# Patient Record
Sex: MALE | Race: WHITE | HISPANIC OR LATINO | Employment: FULL TIME | URBAN - METROPOLITAN AREA
[De-identification: names, ages, dates, MRNs, and addresses within clinical notes are randomized per-mention and may not be internally consistent; named-entity substitution may affect disease eponyms.]

---

## 2018-06-04 ENCOUNTER — INITIAL CONSULT (OUTPATIENT)
Dept: DERMATOLOGY | Facility: CLINIC | Age: 48
End: 2018-06-04
Payer: COMMERCIAL

## 2018-06-04 VITALS
BODY MASS INDEX: 31.79 KG/M2 | SYSTOLIC BLOOD PRESSURE: 148 MMHG | DIASTOLIC BLOOD PRESSURE: 87 MMHG | WEIGHT: 203 LBS | HEART RATE: 103 BPM

## 2018-06-04 DIAGNOSIS — C44.01 BASAL CELL CARCINOMA, LIP: Primary | ICD-10-CM

## 2018-06-04 PROCEDURE — 99999 PR PBB SHADOW E&M-EST. PATIENT-LVL III: CPT | Mod: PBBFAC,,, | Performed by: DERMATOLOGY

## 2018-06-04 PROCEDURE — 99242 OFF/OP CONSLTJ NEW/EST SF 20: CPT | Mod: S$GLB,,, | Performed by: DERMATOLOGY

## 2018-06-04 RX ORDER — GLIPIZIDE 5 MG/1
5 TABLET, FILM COATED, EXTENDED RELEASE ORAL 2 TIMES DAILY
Refills: 3 | COMMUNITY
Start: 2018-03-15 | End: 2019-12-02

## 2018-06-04 RX ORDER — ERGOCALCIFEROL 1.25 MG/1
50000 CAPSULE ORAL
Refills: 1 | COMMUNITY
Start: 2018-03-23 | End: 2019-08-23

## 2018-06-04 RX ORDER — SITAGLIPTIN AND METFORMIN HYDROCHLORIDE 1000; 50 MG/1; MG/1
1 TABLET, FILM COATED ORAL 2 TIMES DAILY
Refills: 3 | COMMUNITY
Start: 2018-04-17 | End: 2019-09-06

## 2018-06-04 RX ORDER — NIFEDIPINE 90 MG/1
90 TABLET, FILM COATED, EXTENDED RELEASE ORAL DAILY
Refills: 3 | COMMUNITY
Start: 2018-03-17 | End: 2019-08-23

## 2018-06-04 RX ORDER — LOSARTAN POTASSIUM 100 MG/1
100 TABLET ORAL DAILY
Refills: 3 | COMMUNITY
Start: 2018-02-28 | End: 2019-12-02

## 2018-06-04 NOTE — PROGRESS NOTES
"REFERRING MD:  Maria Ibanez-Labadie, M.D.    CHIEF COMPLAINT:  New patient being consulted for Mohs' surgery evaluation.    HISTORY OF PRESENT ILLNESS:  48 y.o. male presents with a 3 month(s) history of growth on the L upper lip. Was a "big bump". (+) used Neosporin. (+) Never healed. Pt is from [place] - [place] wore sunscreen.    Negative for scabbing.  Negative for crusting.  Positive for bleeding.  Negative for itching.    Biopsy consistent with basal cell carcinoma.     No prior treatment.    Pacemaker: No  Defibrillator: No  Artificial joints: No  Artificial heart valves: No    PAST MEDICAL HISTORY:  Past Medical History:   Diagnosis Date    Basal cell carcinoma     Diabetes mellitus     Hypertension     Kidney stones        PAST SURGICAL HISTORY:  Past Surgical History:   Procedure Laterality Date    INNER EAR SURGERY          SOCIAL HISTORY:  Dependencies:  never smoked    PERTINENT MEDICATIONS:  See medications list.    ALLERGIES:  Patient has no known allergies.    ROS:  Skin: See HPI  Constitutional: No fatigue, fever, malaise, weight loss, or night sweats.  Cardiovascular: No chest pain, palpitations, or edema.  Respiratory: No coughing, wheezing, SOB, or sputum production.    Physical Exam   HENT:   Mouth/Throat:             General: Mood and affect normal. Alert and orient X3. Normal appearance.  Eyelids:  no suspicious lesions  Head/Face:  no suspicious lesions  Lips/Teeth/Gums: L upper mucosal lip extending up to vermilion located 3.5 cm medially from the right oral commissure and 2 cm medially from the left oral commissure.      IMPRESSION:  Biopsy proven nodular basal cell carcinoma, L upper lip, path# [id number].    PLAN:  The diagnosis and the pathology report were discussed in detail with the patient. Treatment options were reviewed, including Mohs Micrographic Surgery, radiation, topical therapy, and standard excision.  After careful review of patient's history and " physical exam, and after discussion of treatment options, the decision was made to perform Mohs micrographic surgery.    Scheduled patient for Mohs Micrographic Surgery. Risks, benefits, and alternatives of Mohs' surgery discussed with the patient. Discussed repair options including complex closure, skin flap, skin graft and second intention healing with the patient. Pre-operative instructions provided to the patient.

## 2018-06-12 ENCOUNTER — TELEPHONE (OUTPATIENT)
Dept: DERMATOLOGY | Facility: CLINIC | Age: 48
End: 2018-06-12

## 2018-06-12 NOTE — TELEPHONE ENCOUNTER
----- Message from Clementine Rodriguez sent at 6/12/2018  4:07 PM CDT -----  Contact: patient wife  Please call above patient wife need to speak with the nurse before surgery

## 2018-06-14 ENCOUNTER — PROCEDURE VISIT (OUTPATIENT)
Dept: DERMATOLOGY | Facility: CLINIC | Age: 48
End: 2018-06-14
Payer: COMMERCIAL

## 2018-06-14 VITALS
SYSTOLIC BLOOD PRESSURE: 135 MMHG | HEART RATE: 107 BPM | BODY MASS INDEX: 31.86 KG/M2 | WEIGHT: 203 LBS | DIASTOLIC BLOOD PRESSURE: 78 MMHG | HEIGHT: 67 IN

## 2018-06-14 DIAGNOSIS — C44.01 BASAL CELL CARCINOMA, LIP: Primary | ICD-10-CM

## 2018-06-14 PROCEDURE — 99499 UNLISTED E&M SERVICE: CPT | Mod: S$GLB,,, | Performed by: DERMATOLOGY

## 2018-06-14 PROCEDURE — 17311 MOHS 1 STAGE H/N/HF/G: CPT | Mod: S$GLB,,, | Performed by: DERMATOLOGY

## 2018-06-14 PROCEDURE — 13151 CMPLX RPR E/N/E/L 1.1-2.5 CM: CPT | Mod: 51,S$GLB,, | Performed by: DERMATOLOGY

## 2018-06-14 RX ORDER — OXYCODONE AND ACETAMINOPHEN 5; 325 MG/1; MG/1
1 TABLET ORAL
Qty: 20 TABLET | Refills: 0 | Status: SHIPPED | OUTPATIENT
Start: 2018-06-14 | End: 2018-07-23

## 2018-06-14 RX ORDER — OXYCODONE AND ACETAMINOPHEN 5; 325 MG/1; MG/1
1 TABLET ORAL
Qty: 20 TABLET | Refills: 0 | Status: SHIPPED | OUTPATIENT
Start: 2018-06-14 | End: 2018-06-14 | Stop reason: SDUPTHER

## 2018-06-14 RX ORDER — CEPHALEXIN 500 MG/1
500 CAPSULE ORAL 3 TIMES DAILY
Qty: 21 CAPSULE | Refills: 0 | Status: SHIPPED | OUTPATIENT
Start: 2018-06-14 | End: 2018-06-21

## 2018-06-14 RX ORDER — CEPHALEXIN 500 MG/1
500 CAPSULE ORAL 3 TIMES DAILY
Qty: 21 CAPSULE | Refills: 0 | Status: SHIPPED | OUTPATIENT
Start: 2018-06-14 | End: 2018-06-14 | Stop reason: SDUPTHER

## 2018-06-14 NOTE — PROGRESS NOTES
PROCEDURE: Mohs' Micrographic Surgery    INDICATION: Location in mask areas of face including central face, nose, eyelids, eyebrows, lips, chin, preauricular, temple, and ear. Biopsy-proven skin cancer of cosmetically and functionally important areas, including head, neck, genital, hand, foot, or areas known for having difficulty in healing, such as the lower anterior legs. Tumor with ill-defined borders.    REFERRING MD: Maria Ibanez-Labadie, M.D.    CASE NUMBER:     ANESTHETIC: 1.5 cc 0.5% Lidocaine with Epi 1:200,000 mixed 1:1 with 0.5% Bupivacaine    SURGICAL PREP: Betadine    SURGEON: Moiz Florian MD    ASSISTANTS: Anni Jung PA-C and Maria Elena Cameron MA    PREOPERATIVE DIAGNOSIS: basal cell carcinoma    POSTOPERATIVE DIAGNOSIS: basal cell carcinoma    PATHOLOGIC DIAGNOSIS: basal cell carcinoma- nodular    HISTOLOGY OF SPECIMENS IN FIRST STAGE:   Tumor Type: No tumor seen.    STAGES OF MOHS' SURGERY PERFORMED: 1    TUMOR-FREE PLANE ACHIEVED: Yes    HEMOSTASIS: electrocoagulation     SPECIMENS: 2     LOCATION: left upper (mucosal) lip. Patient verified location with hand held mirror.    INITIAL LESION SIZE: 0.3 x 0.7 cm    FINAL DEFECT SIZE: 0.8 x 0.9 cm    WOUND REPAIR/DISPOSITION: The patient tolerated Mohs' Micrographic Surgery for a basal cell carcinoma very well. When the tumor was completely removed, a repair of the surgical defect was undertaken.      PROCEDURE: Complex Linear Repair    INDICATION: Status post Mohs' Micrographic Surgery for basal cell carcinoma.    CASE NUMBER:     SURGEON: Moiz Florian MD    ASSISTANTS: Anni Jung PA-C and Maria Elena Cameron MA    ANESTHETIC: 2.5 cc 1% Lidocaine with Epinephrine 1:100,000    SURGICAL PREP: Betadine    LOCATION: left upper (mucosal) lip    DEFECT SIZE: 0.8 x 0.9 cm    WOUND REPAIR/DISPOSITION:  After the patient's carcinoma had been completely removed with Mohs' Micrographic Surgery, a repair of the surgical defect was undertaken. The  "patient was returned to the operating suite where the area of left upper lip was prepped, draped, and anesthetized in the usual sterile fashion. Prior to administration of local anesthetic, the vermilion border was marked with a sterile marking pen. The wound was widely undermined in all directions. Then, electrocoagulation was used to obtain meticulous hemostasis. 5-0 Vicryl buried vertical mattress sutures were placed into the subcutaneous and dermal plane to close the wound and lupe the cutaneous wound edge. Bilateral dog ears were identified and were removed by a standard Burow's triangle technique. The cutaneous wound edges were closed using interrupted 5-0 Prolene and 5-0 silk suture. Care was taken to realign the vermilion border.    The patient tolerated the procedure well.    The area was cleaned and dressed appropriately and the patient was given wound care instructions, as well as appointment for follow-up evaluation. Patient was placed on Percocet 5 prn postop pain and Keflex 500 mg TID x 7 days.    LENGTH OF REPAIR: 2.4 cm    Vitals:    06/14/18 0755 06/14/18 0935   BP: (!) 142/81 135/78   BP Location: Left arm    Patient Position: Sitting    BP Method: Medium (Automatic)    Pulse: (!) 115 107   Weight: 92.1 kg (203 lb)    Height: 5' 7" (1.702 m)          "

## 2018-06-21 ENCOUNTER — OFFICE VISIT (OUTPATIENT)
Dept: DERMATOLOGY | Facility: CLINIC | Age: 48
End: 2018-06-21
Payer: COMMERCIAL

## 2018-06-21 DIAGNOSIS — Z09 POSTOP CHECK: Primary | ICD-10-CM

## 2018-06-21 PROCEDURE — 99024 POSTOP FOLLOW-UP VISIT: CPT | Mod: S$GLB,,, | Performed by: DERMATOLOGY

## 2018-06-21 PROCEDURE — 99999 PR PBB SHADOW E&M-EST. PATIENT-LVL III: CPT | Mod: PBBFAC,,, | Performed by: DERMATOLOGY

## 2018-06-21 RX ORDER — ATORVASTATIN CALCIUM 80 MG/1
80 TABLET, FILM COATED ORAL DAILY
COMMUNITY
Start: 2018-06-19 | End: 2022-10-10 | Stop reason: SDUPTHER

## 2018-06-21 NOTE — PROGRESS NOTES
48 y.o. male patient is here for suture removal following Mohs' surgery.    Patient reports no problems.    WOUND PE:  The L upper lip sutures intact. Wound healing well. Good skin edges. No signs or symptoms of infection. Vermilion border intact with good symmetry.    IMPRESSION:  Healing operative site from Mohs' surgery, BCC L upper lip s/p Mohs with CLC, postop day #7.    PLAN:  Sutures removed today.   Continue wound care.  Keep moist with Aquaphor.    RTC:  In 1 month.

## 2018-07-23 ENCOUNTER — OFFICE VISIT (OUTPATIENT)
Dept: DERMATOLOGY | Facility: CLINIC | Age: 48
End: 2018-07-23
Payer: COMMERCIAL

## 2018-07-23 DIAGNOSIS — C44.01 BASAL CELL CARCINOMA, LIP: Primary | ICD-10-CM

## 2018-07-23 PROCEDURE — 99212 OFFICE O/P EST SF 10 MIN: CPT | Mod: S$GLB,,, | Performed by: DERMATOLOGY

## 2018-07-23 PROCEDURE — 99999 PR PBB SHADOW E&M-EST. PATIENT-LVL II: CPT | Mod: PBBFAC,,, | Performed by: DERMATOLOGY

## 2018-07-23 RX ORDER — NIFEDIPINE 60 MG/1
TABLET, EXTENDED RELEASE ORAL
Refills: 1 | COMMUNITY
Start: 2018-07-07 | End: 2022-06-10

## 2018-07-23 RX ORDER — ICOSAPENT ETHYL 1000 MG/1
2 CAPSULE ORAL 2 TIMES DAILY
Refills: 3 | COMMUNITY
Start: 2018-07-12 | End: 2020-08-06 | Stop reason: CLARIF

## 2018-07-23 NOTE — PROGRESS NOTES
48 y.o. male patient is here for wound check after surgery.    Patient reports no problems.    WOUND PE:  The L upper lip incision is well healed. Mild firmness and erythema of scar. No nodularity. Vermilion border intact with good symmetry.      IMPRESSION:  Healing operative site from Mohs' surgery, BCC L upper lip s/p Mohs with CLC, postop week #5, well healed and no evidence of recurrence.    PLAN:    Recommended massage tid.  Reassured patient that redness and firmness will fade with time.  Daily SPF.  Regular skin checks.    RTC:  In 3-6 months with Maria Ibanez-Labadie, M.D. for skin check or sooner if new concern arises.

## 2019-03-26 ENCOUNTER — TELEPHONE (OUTPATIENT)
Dept: DERMATOLOGY | Facility: CLINIC | Age: 49
End: 2019-03-26

## 2019-03-26 NOTE — TELEPHONE ENCOUNTER
----- Message from Nataliya Isaac sent at 3/26/2019  8:54 AM CDT -----  Contact: pts keven Florian Patient Requesting Sooner Appointment.     Reason for sooner appt.: pt had surgery and they have a spot on the side of the surgery site that the pt is concerned about pt had surgery last year   When is the first available appointment? N/A  Communication Preference: can you please call pts keven Santana  at [phone]  Additional Information: pt would like to be seen this week     BERRY

## 2019-03-26 NOTE — TELEPHONE ENCOUNTER
Spoke with pt's keven and advised her to contact general derm to make an appt for skin check. She understands if they take a bx and it comes back positive for skin cancer then he will be referred to Dr. Florian for removal.

## 2019-03-27 ENCOUNTER — TELEPHONE (OUTPATIENT)
Dept: DERMATOLOGY | Facility: CLINIC | Age: 49
End: 2019-03-27

## 2019-03-27 NOTE — TELEPHONE ENCOUNTER
Returned pt's call. No answer. Left voice message.----- Message from Sarina Chaudhary sent at 3/26/2019  4:07 PM CDT -----  Contact: Patients wife       ----- Message -----  From: Pooja Santana  Sent: 3/26/2019   3:15 PM  To: Juan Luis Staff    Patient Requesting New Patient Appointment.     Reason for appt.: Patient would like to be seen for an overall skin check and to get established w/ a new provider. Patient has a history of skin cancer.    Communication Preference: [phone]

## 2019-08-23 ENCOUNTER — CLINICAL SUPPORT (OUTPATIENT)
Dept: FAMILY MEDICINE | Facility: CLINIC | Age: 49
End: 2019-08-23
Attending: FAMILY MEDICINE
Payer: COMMERCIAL

## 2019-08-23 ENCOUNTER — LAB VISIT (OUTPATIENT)
Dept: LAB | Facility: HOSPITAL | Age: 49
End: 2019-08-23
Attending: FAMILY MEDICINE
Payer: COMMERCIAL

## 2019-08-23 ENCOUNTER — OFFICE VISIT (OUTPATIENT)
Dept: FAMILY MEDICINE | Facility: CLINIC | Age: 49
End: 2019-08-23
Payer: COMMERCIAL

## 2019-08-23 VITALS
HEART RATE: 104 BPM | SYSTOLIC BLOOD PRESSURE: 136 MMHG | TEMPERATURE: 98 F | WEIGHT: 198.44 LBS | HEIGHT: 67 IN | BODY MASS INDEX: 31.15 KG/M2 | OXYGEN SATURATION: 98 % | DIASTOLIC BLOOD PRESSURE: 84 MMHG

## 2019-08-23 DIAGNOSIS — E78.5 DYSLIPIDEMIA: ICD-10-CM

## 2019-08-23 DIAGNOSIS — I10 BENIGN HYPERTENSION: ICD-10-CM

## 2019-08-23 DIAGNOSIS — Z13.5 SCREENING FOR DIABETIC RETINOPATHY: ICD-10-CM

## 2019-08-23 DIAGNOSIS — E11.40 TYPE 2 DIABETES MELLITUS WITH DIABETIC NEUROPATHY, WITHOUT LONG-TERM CURRENT USE OF INSULIN: Primary | ICD-10-CM

## 2019-08-23 DIAGNOSIS — Z12.11 SCREEN FOR COLON CANCER: ICD-10-CM

## 2019-08-23 DIAGNOSIS — E11.40 TYPE 2 DIABETES MELLITUS WITH DIABETIC NEUROPATHY, WITHOUT LONG-TERM CURRENT USE OF INSULIN: ICD-10-CM

## 2019-08-23 DIAGNOSIS — Z23 NEED FOR VACCINATION: ICD-10-CM

## 2019-08-23 DIAGNOSIS — N18.30 CKD (CHRONIC KIDNEY DISEASE) STAGE 3, GFR 30-59 ML/MIN: ICD-10-CM

## 2019-08-23 LAB
ALBUMIN SERPL BCP-MCNC: 4.2 G/DL (ref 3.5–5.2)
ALP SERPL-CCNC: 72 U/L (ref 55–135)
ALT SERPL W/O P-5'-P-CCNC: 38 U/L (ref 10–44)
ANION GAP SERPL CALC-SCNC: 12 MMOL/L (ref 8–16)
AST SERPL-CCNC: 41 U/L (ref 10–40)
BASOPHILS # BLD AUTO: 0.03 K/UL (ref 0–0.2)
BASOPHILS NFR BLD: 0.3 % (ref 0–1.9)
BILIRUB SERPL-MCNC: 0.6 MG/DL (ref 0.1–1)
BUN SERPL-MCNC: 34 MG/DL (ref 6–20)
CALCIUM SERPL-MCNC: 9.5 MG/DL (ref 8.7–10.5)
CHLORIDE SERPL-SCNC: 104 MMOL/L (ref 95–110)
CHOLEST SERPL-MCNC: 170 MG/DL (ref 120–199)
CHOLEST/HDLC SERPL: 7.7 {RATIO} (ref 2–5)
CO2 SERPL-SCNC: 23 MMOL/L (ref 23–29)
CREAT SERPL-MCNC: 2.3 MG/DL (ref 0.5–1.4)
DIFFERENTIAL METHOD: ABNORMAL
EOSINOPHIL # BLD AUTO: 0.3 K/UL (ref 0–0.5)
EOSINOPHIL NFR BLD: 3.5 % (ref 0–8)
ERYTHROCYTE [DISTWIDTH] IN BLOOD BY AUTOMATED COUNT: 13.7 % (ref 11.5–14.5)
EST. GFR  (AFRICAN AMERICAN): 37 ML/MIN/1.73 M^2
EST. GFR  (NON AFRICAN AMERICAN): 32 ML/MIN/1.73 M^2
GLUCOSE SERPL-MCNC: 207 MG/DL (ref 70–110)
HCT VFR BLD AUTO: 33.7 % (ref 40–54)
HDLC SERPL-MCNC: 22 MG/DL (ref 40–75)
HDLC SERPL: 12.9 % (ref 20–50)
HGB BLD-MCNC: 11 G/DL (ref 14–18)
LDLC SERPL CALC-MCNC: ABNORMAL MG/DL (ref 63–159)
LYMPHOCYTES # BLD AUTO: 2 K/UL (ref 1–4.8)
LYMPHOCYTES NFR BLD: 23.5 % (ref 18–48)
MCH RBC QN AUTO: 28.9 PG (ref 27–31)
MCHC RBC AUTO-ENTMCNC: 32.6 G/DL (ref 32–36)
MCV RBC AUTO: 89 FL (ref 82–98)
MONOCYTES # BLD AUTO: 0.9 K/UL (ref 0.3–1)
MONOCYTES NFR BLD: 10.6 % (ref 4–15)
NEUTROPHILS # BLD AUTO: 5.4 K/UL (ref 1.8–7.7)
NEUTROPHILS NFR BLD: 62.3 % (ref 38–73)
NONHDLC SERPL-MCNC: 148 MG/DL
PLATELET # BLD AUTO: 231 K/UL (ref 150–350)
PMV BLD AUTO: 11.8 FL (ref 9.2–12.9)
POTASSIUM SERPL-SCNC: 4.1 MMOL/L (ref 3.5–5.1)
PROT SERPL-MCNC: 8.8 G/DL (ref 6–8.4)
PTH-INTACT SERPL-MCNC: 161.2 PG/ML (ref 9–77)
RBC # BLD AUTO: 3.81 M/UL (ref 4.6–6.2)
SODIUM SERPL-SCNC: 139 MMOL/L (ref 136–145)
TRIGL SERPL-MCNC: 652 MG/DL (ref 30–150)
TSH SERPL DL<=0.005 MIU/L-ACNC: 2.5 UIU/ML (ref 0.4–4)
WBC # BLD AUTO: 8.64 K/UL (ref 3.9–12.7)

## 2019-08-23 PROCEDURE — 36415 COLL VENOUS BLD VENIPUNCTURE: CPT | Mod: PN

## 2019-08-23 PROCEDURE — 90471 PNEUMOCOCCAL CONJUGATE VACCINE 13-VALENT LESS THAN 5YO & GREATER THAN: ICD-10-PCS | Mod: S$GLB,,, | Performed by: FAMILY MEDICINE

## 2019-08-23 PROCEDURE — 99204 PR OFFICE/OUTPT VISIT, NEW, LEVL IV, 45-59 MIN: ICD-10-PCS | Mod: 25,S$GLB,, | Performed by: FAMILY MEDICINE

## 2019-08-23 PROCEDURE — 90670 PNEUMOCOCCAL CONJUGATE VACCINE 13-VALENT LESS THAN 5YO & GREATER THAN: ICD-10-PCS | Mod: S$GLB,,, | Performed by: FAMILY MEDICINE

## 2019-08-23 PROCEDURE — 92250 FUNDUS PHOTOGRAPHY W/I&R: CPT | Mod: 26,S$GLB,, | Performed by: OPTOMETRIST

## 2019-08-23 PROCEDURE — 84443 ASSAY THYROID STIM HORMONE: CPT

## 2019-08-23 PROCEDURE — 83970 ASSAY OF PARATHORMONE: CPT

## 2019-08-23 PROCEDURE — 90670 PCV13 VACCINE IM: CPT | Mod: S$GLB,,, | Performed by: FAMILY MEDICINE

## 2019-08-23 PROCEDURE — 80061 LIPID PANEL: CPT

## 2019-08-23 PROCEDURE — 99204 OFFICE O/P NEW MOD 45 MIN: CPT | Mod: 25,S$GLB,, | Performed by: FAMILY MEDICINE

## 2019-08-23 PROCEDURE — 99999 PR PBB SHADOW E&M-EST. PATIENT-LVL III: CPT | Mod: PBBFAC,,, | Performed by: FAMILY MEDICINE

## 2019-08-23 PROCEDURE — 2024F 7 FLD RTA PHOTO EVC RTNOPTHY: CPT | Mod: S$GLB,,, | Performed by: FAMILY MEDICINE

## 2019-08-23 PROCEDURE — 92250 DIABETIC EYE SCREENING PHOTO: ICD-10-PCS | Mod: 26,S$GLB,, | Performed by: OPTOMETRIST

## 2019-08-23 PROCEDURE — 2024F PR 7 FIELD PHOTOS WITH INTERP/ REVIEW: ICD-10-PCS | Mod: S$GLB,,, | Performed by: FAMILY MEDICINE

## 2019-08-23 PROCEDURE — 92250 FUNDUS PHOTOGRAPHY W/I&R: CPT | Mod: TC,S$GLB,, | Performed by: FAMILY MEDICINE

## 2019-08-23 PROCEDURE — 90471 IMMUNIZATION ADMIN: CPT | Mod: S$GLB,,, | Performed by: FAMILY MEDICINE

## 2019-08-23 PROCEDURE — 99999 PR PBB SHADOW E&M-EST. PATIENT-LVL III: ICD-10-PCS | Mod: PBBFAC,,, | Performed by: FAMILY MEDICINE

## 2019-08-23 PROCEDURE — 85025 COMPLETE CBC W/AUTO DIFF WBC: CPT

## 2019-08-23 PROCEDURE — 92250 DIABETIC EYE SCREENING PHOTO: ICD-10-PCS | Mod: TC,S$GLB,, | Performed by: FAMILY MEDICINE

## 2019-08-23 PROCEDURE — 83036 HEMOGLOBIN GLYCOSYLATED A1C: CPT

## 2019-08-23 PROCEDURE — 80053 COMPREHEN METABOLIC PANEL: CPT

## 2019-08-23 RX ORDER — FUROSEMIDE 80 MG/1
80 TABLET ORAL EVERY MORNING
Refills: 3 | COMMUNITY
Start: 2019-07-02 | End: 2019-12-02

## 2019-08-23 RX ORDER — FUROSEMIDE 40 MG/1
40 TABLET ORAL DAILY
Refills: 1 | COMMUNITY
Start: 2019-05-28 | End: 2019-08-23

## 2019-08-23 RX ORDER — ASPIRIN 81 MG/1
81 TABLET ORAL
COMMUNITY
End: 2023-09-19

## 2019-08-23 NOTE — PROGRESS NOTES
.Nicholas Nugent is a 49 y.o. male here for a diabetic eye screening with non-dilated fundus photos per PCP orders.    Patient cooperative?: Yes  Small pupils?: Yes  Last eye exam: ?    For exam results, see Encounter Report.

## 2019-08-24 LAB
ESTIMATED AVG GLUCOSE: 183 MG/DL (ref 68–131)
HBA1C MFR BLD HPLC: 8 % (ref 4–5.6)

## 2019-08-31 NOTE — PROGRESS NOTES
Routine Office Visit    Patient Name: Nicholas Nugent    : 1970  MRN: [id number]    Subjective:  Nicholas is a 49 y.o. male who presents today for:   Chief Complaint   Patient presents with    Establish Care     49-year-old male with hypertension, diabetes, chronic kidney disease stage 3, and dyslipidemia comes in to establish care with new PCP.  He sees a nephrologist in [place].  He had not been really seeing primary care.  The patient last saw his nephrologist in July, who strongly encouraged the patient to see a primary care provider.  The patient reports that he has not had an eye exam done in some time.  He also reports that he has not had colon cancer screening done.      Past Medical History  Past Medical History:   Diagnosis Date    Basal cell carcinoma 2018    lip    CKD (chronic kidney disease) stage 3, GFR 30-59 ml/min     Diabetes mellitus     Diabetes mellitus type I     Hyperlipidemia     Hypertension     Kidney stones        Past Surgical History  Past Surgical History:   Procedure Laterality Date    CYSTOSCOPY, POSSIBLE OCCLUSION BALLOON PLACEMENT Left 10/23/2015    Performed by [facility] at [facility]    INNER EAR SURGERY      NEPHROLITHOTOMY-PERCUTANEOUS (C-ARM) Left 10/23/2015    Performed by [facility] at [facility] OR        Family History  No family history on file.    Social History  Social History     Socioeconomic History    Marital status: Significant Other     Spouse name: Not on file    Number of children: Not on file    Years of education: Not on file    Highest education level: Not on file   Occupational History    Not on file   Social Needs    Financial resource strain: Not on file    Food insecurity:     Worry: Not on file     Inability: Not on file    Transportation needs:     Medical: Not on file     Non-medical: Not on file   Tobacco Use    Smoking status: Never Smoker    Smokeless tobacco: Never Used   Substance and Sexual Activity    Alcohol  use: Yes     Alcohol/week: 0.0 oz     Comment: occas    Drug use: No    Sexual activity: Not Currently   Lifestyle    Physical activity:     Days per week: Not on file     Minutes per session: Not on file    Stress: Not on file   Relationships    Social connections:     Talks on phone: Not on file     Gets together: Not on file     Attends Rastafarian service: Not on file     Active member of club or organization: Not on file     Attends meetings of clubs or organizations: Not on file     Relationship status: Not on file   Other Topics Concern    Not on file   Social History Narrative    Not on file       Current Medications  Current Outpatient Medications on File Prior to Visit   Medication Sig Dispense Refill    aspirin (ECOTRIN) 81 MG EC tablet Take 81 mg by mouth.      atorvastatin (LIPITOR) 80 MG tablet Take 80 mg by mouth once daily.       furosemide (LASIX) 80 MG tablet Take 80 mg by mouth every morning.   3    glipiZIDE (GLUCOTROL) 5 MG TR24 Take 5 mg by mouth 2 (two) times daily.   3    JANUMET 50-1,000 mg per tablet Take 1 tablet by mouth 2 (two) times daily.  3    losartan (COZAAR) 100 MG tablet Take 100 mg by mouth once daily.  3    NIFEdipine (ADALAT CC) 60 MG TbSR   1    VASCEPA 1 gram Cap Take 2 capsules by mouth 2 (two) times daily.  3     No current facility-administered medications on file prior to visit.        Allergies   Review of patient's allergies indicates:  No Known Allergies    Review of Systems   Constitutional: Negative for activity change.   HENT: Negative for hearing loss, rhinorrhea and trouble swallowing.    Eyes: Negative for discharge and visual disturbance.   Respiratory: Negative for chest tightness and wheezing.    Cardiovascular: Negative for chest pain and palpitations.   Gastrointestinal: Negative for blood in stool, constipation, diarrhea and vomiting.   Endocrine: Negative for polydipsia and polyuria.   Genitourinary: Negative for difficulty urinating,  "hematuria and urgency.   Musculoskeletal: Negative for arthralgias, joint swelling and neck pain.   Neurological: Positive for weakness. Negative for headaches.   Psychiatric/Behavioral: Negative for confusion and dysphoric mood.         /84   Pulse 104   Temp 97.8 °F (36.6 °C) (Oral)   Ht 5' 7" (1.702 m)   Wt 90 kg (198 lb 6.6 oz)   SpO2 98%   BMI 31.08 kg/m²     Physical Exam   Constitutional: He is oriented to person, place, and time. He appears well-developed and well-nourished.   HENT:   Head: Normocephalic.   Right Ear: External ear normal.   Left Ear: External ear normal.   Nose: Nose normal.   Mouth/Throat: No oropharyngeal exudate.   Neck: Normal range of motion. Neck supple. No tracheal deviation present.   Cardiovascular: Normal rate, regular rhythm, normal heart sounds and intact distal pulses.   No murmur heard.  Pulmonary/Chest: Effort normal and breath sounds normal. He has no wheezes. He has no rales.   Abdominal: Soft. Bowel sounds are normal. He exhibits no mass. There is no tenderness. There is no rigidity, no rebound, no guarding and no CVA tenderness.   Musculoskeletal: He exhibits no edema.   Lymphadenopathy:     He has no cervical adenopathy.   Neurological: He is oriented to person, place, and time. He has normal strength. He displays no atrophy. No sensory deficit. Gait normal.   Reflex Scores:       Patellar reflexes are 2+ on the right side and 2+ on the left side.  Skin: He is not diaphoretic.   Vitals reviewed.    Protective Sensation (w/ 10 gram monofilament):  Right: Intact  Left: Intact    Visual Inspection:  Callus -  Bilateral    Pedal Pulses:   Right: Present  Left: Present    Posterior tibialis:   Right:Present  Left: Present        Assessment/Plan:  Nicholas was seen today for [facility] care.    Diagnoses and all orders for this visit:    Type 2 diabetes mellitus with diabetic neuropathy, without long-term current use of insulin  -     Comprehensive metabolic panel; " Future  -     Hemoglobin A1c; Future  -     Diabetic Eye Screening Photo; Future  -     Microalbumin/creatinine urine ratio; Future  -     TSH; Future  Diabetic labs ordered.  Patient's check fingersticks and keep a log.  He is to bring the log to his next appointment.    Benign hypertension  -     Comprehensive metabolic panel; Future  -     Microalbumin/creatinine urine ratio; Future  -     TSH; Future  Continue current regimen for now.  Check labs.  As his systolic is above 130 and he has ckd may need adjustment, but will hold off until his blood pressure is checked at next visit.    CKD (chronic kidney disease) stage 3, GFR 30-59 ml/min  -     Comprehensive metabolic panel; Future  -     Microalbumin/creatinine urine ratio; Future  -     PTH, intact; Future  -     CBC auto differential; Future  -     TSH; Future  Check CKD three labs    Dyslipidemia  -     Comprehensive metabolic panel; Future  -     Lipid panel; Future  Check lipid panel.    Screen for colon cancer  -     Fecal Immunochemical Test (iFOBT); Future  [employer] was given to patient on 8/23/2019     Screening for diabetic retinopathy  -     Diabetic Eye Screening Photo; Future    Need for vaccination  -     (In Office Administered) Pneumococcal Conjugate Vaccine (13 Valent) (IM)                -Gurwinder Olmedo Jr., MD, AAHIVS          This office note has been dictated.  This dictation has been generated using M-Modal Fluency Direct dictation; some phonetic errors may occur.

## 2019-09-04 ENCOUNTER — LAB VISIT (OUTPATIENT)
Dept: LAB | Facility: HOSPITAL | Age: 49
End: 2019-09-04
Attending: FAMILY MEDICINE
Payer: COMMERCIAL

## 2019-09-04 DIAGNOSIS — Z12.11 SCREEN FOR COLON CANCER: ICD-10-CM

## 2019-09-04 LAB — HEMOCCULT STL QL IA: NEGATIVE

## 2019-09-04 PROCEDURE — 82274 ASSAY TEST FOR BLOOD FECAL: CPT

## 2019-09-06 ENCOUNTER — OFFICE VISIT (OUTPATIENT)
Dept: FAMILY MEDICINE | Facility: CLINIC | Age: 49
End: 2019-09-06
Payer: COMMERCIAL

## 2019-09-06 VITALS
DIASTOLIC BLOOD PRESSURE: 74 MMHG | BODY MASS INDEX: 30.86 KG/M2 | HEIGHT: 67 IN | OXYGEN SATURATION: 98 % | WEIGHT: 196.63 LBS | HEART RATE: 97 BPM | SYSTOLIC BLOOD PRESSURE: 116 MMHG | RESPIRATION RATE: 18 BRPM | TEMPERATURE: 98 F

## 2019-09-06 DIAGNOSIS — N18.30 CKD (CHRONIC KIDNEY DISEASE) STAGE 3, GFR 30-59 ML/MIN: ICD-10-CM

## 2019-09-06 DIAGNOSIS — E78.5 DYSLIPIDEMIA: ICD-10-CM

## 2019-09-06 DIAGNOSIS — E11.40 TYPE 2 DIABETES MELLITUS WITH DIABETIC NEUROPATHY, WITHOUT LONG-TERM CURRENT USE OF INSULIN: Primary | ICD-10-CM

## 2019-09-06 PROCEDURE — 99214 OFFICE O/P EST MOD 30 MIN: CPT | Mod: S$GLB,,, | Performed by: FAMILY MEDICINE

## 2019-09-06 PROCEDURE — 99999 PR PBB SHADOW E&M-EST. PATIENT-LVL III: ICD-10-PCS | Mod: PBBFAC,,, | Performed by: FAMILY MEDICINE

## 2019-09-06 PROCEDURE — 2024F PR 7 FIELD PHOTOS WITH INTERP/ REVIEW: ICD-10-PCS | Mod: S$GLB,,, | Performed by: FAMILY MEDICINE

## 2019-09-06 PROCEDURE — 99214 PR OFFICE/OUTPT VISIT, EST, LEVL IV, 30-39 MIN: ICD-10-PCS | Mod: S$GLB,,, | Performed by: FAMILY MEDICINE

## 2019-09-06 PROCEDURE — 2024F 7 FLD RTA PHOTO EVC RTNOPTHY: CPT | Mod: S$GLB,,, | Performed by: FAMILY MEDICINE

## 2019-09-06 PROCEDURE — 99999 PR PBB SHADOW E&M-EST. PATIENT-LVL III: CPT | Mod: PBBFAC,,, | Performed by: FAMILY MEDICINE

## 2019-09-13 ENCOUNTER — LAB VISIT (OUTPATIENT)
Dept: LAB | Facility: HOSPITAL | Age: 49
End: 2019-09-13
Attending: FAMILY MEDICINE
Payer: COMMERCIAL

## 2019-09-13 DIAGNOSIS — N18.30 CKD (CHRONIC KIDNEY DISEASE) STAGE 3, GFR 30-59 ML/MIN: ICD-10-CM

## 2019-09-13 DIAGNOSIS — E78.5 DYSLIPIDEMIA: ICD-10-CM

## 2019-09-13 LAB
ANION GAP SERPL CALC-SCNC: 9 MMOL/L (ref 8–16)
BUN SERPL-MCNC: 31 MG/DL (ref 6–20)
CALCIUM SERPL-MCNC: 9.8 MG/DL (ref 8.7–10.5)
CHLORIDE SERPL-SCNC: 106 MMOL/L (ref 95–110)
CHOLEST SERPL-MCNC: 121 MG/DL (ref 120–199)
CHOLEST/HDLC SERPL: 6.7 {RATIO} (ref 2–5)
CO2 SERPL-SCNC: 25 MMOL/L (ref 23–29)
CREAT SERPL-MCNC: 1.9 MG/DL (ref 0.5–1.4)
EST. GFR  (AFRICAN AMERICAN): 46 ML/MIN/1.73 M^2
EST. GFR  (NON AFRICAN AMERICAN): 40 ML/MIN/1.73 M^2
GLUCOSE SERPL-MCNC: 153 MG/DL (ref 70–110)
HDLC SERPL-MCNC: 18 MG/DL (ref 40–75)
HDLC SERPL: 14.9 % (ref 20–50)
LDLC SERPL CALC-MCNC: ABNORMAL MG/DL (ref 63–159)
NONHDLC SERPL-MCNC: 103 MG/DL
POTASSIUM SERPL-SCNC: 3.6 MMOL/L (ref 3.5–5.1)
SODIUM SERPL-SCNC: 140 MMOL/L (ref 136–145)
TRIGL SERPL-MCNC: 505 MG/DL (ref 30–150)

## 2019-09-13 PROCEDURE — 36415 COLL VENOUS BLD VENIPUNCTURE: CPT | Mod: PN

## 2019-09-13 PROCEDURE — 80048 BASIC METABOLIC PNL TOTAL CA: CPT

## 2019-09-13 PROCEDURE — 80061 LIPID PANEL: CPT

## 2019-09-16 NOTE — PROGRESS NOTES
Routine Office Visit    Patient Name: Nicholas Nugent    : 1970  MRN: [id number]    Subjective:  Nicholas is a 49 y.o. male who presents today for:   Chief Complaint   Patient presents with    Diabetes     2 wk f/u    Hypertension    Follow-up     49-year-old male with diabetes, chronic kidney disease stage 3, dyslipidemia, and hypertension comes in for management of these.  He recently did lab test.  He reports taking all his medications.  He is not routinely checking his sugars.  He reports no side effects on the medications he is taking.    Past Medical History  Past Medical History:   Diagnosis Date    Basal cell carcinoma 2018    lip    CKD (chronic kidney disease) stage 3, GFR 30-59 ml/min     Diabetes mellitus     Diabetes mellitus type I     Hyperlipidemia     Hypertension     Kidney stones        Past Surgical History  Past Surgical History:   Procedure Laterality Date    CYSTOSCOPY, POSSIBLE OCCLUSION BALLOON PLACEMENT Left 10/23/2015    Performed by [facility]LINE [facility] at [facility] OR    INNER EAR SURGERY      NEPHROLITHOTOMY-PERCUTANEOUS (C-ARM) Left 10/23/2015    Performed by EARLINE King MD at [facility] OR        Family History  History reviewed. No pertinent family history.    Social History  Social History     Socioeconomic History    Marital status: Significant Other     Spouse name: Not on file    Number of children: Not on file    Years of education: Not on file    Highest education level: Not on file   Occupational History    Not on file   Social Needs    Financial resource strain: Not on file    Food insecurity:     Worry: Not on file     Inability: Not on file    Transportation needs:     Medical: Not on file     Non-medical: Not on file   Tobacco Use    Smoking status: Never Smoker    Smokeless tobacco: Never Used   Substance and Sexual Activity    Alcohol use: Yes     Alcohol/week: 0.0 oz     Comment: occas    Drug use: No    Sexual activity: Not Currently   Lifestyle  "   Physical activity:     Days per week: Not on file     Minutes per session: Not on file    Stress: Not on file   Relationships    Social connections:     Talks on phone: Not on file     Gets together: Not on file     Attends [employer] service: Not on file     Active member of club or organization: Not on file     Attends meetings of clubs or organizations: Not on file     Relationship status: Not on file   Other Topics Concern    Not on file   Social History Narrative    Not on file       Current Medications  Current Outpatient Medications on File Prior to Visit   Medication Sig Dispense Refill    aspirin (ECOTRIN) 81 MG EC tablet Take 81 mg by mouth.      atorvastatin (LIPITOR) 80 MG tablet Take 80 mg by mouth once daily.       furosemide (LASIX) 80 MG tablet Take 80 mg by mouth every morning.   3    glipiZIDE (GLUCOTROL) 5 MG TR24 Take 5 mg by mouth 2 (two) times daily.   3    losartan (COZAAR) 100 MG tablet Take 100 mg by mouth once daily.  3    NIFEdipine (ADALAT CC) 60 MG TbSR   1    VASCEPA 1 gram Cap Take 2 capsules by mouth 2 (two) times daily.  3     No current facility-administered medications on file prior to visit.        Allergies   Review of patient's allergies indicates:  No Known Allergies    Review of Systems   Eyes: Negative for visual disturbance.   Respiratory: Negative for shortness of breath and wheezing.    Cardiovascular: Negative for chest pain and palpitations.   Gastrointestinal: Negative for abdominal pain, blood in stool, constipation and diarrhea.   Genitourinary: Negative for decreased urine volume, difficulty urinating and dysuria.   Neurological: Negative for dizziness.         /74 (BP Location: Left arm, Patient Position: Sitting, BP Method: Medium (Manual))   Pulse 97   Temp 97.7 °F (36.5 °C) (Oral)   Resp 18   Ht 5' 7" (1.702 m)   Wt 89.2 kg (196 lb 10.4 oz)   SpO2 98%   BMI 30.80 kg/m²     Physical Exam   Constitutional: He is oriented to person, place, " and time. He appears well-developed and well-nourished.   HENT:   Head: Normocephalic.   Right Ear: External ear normal.   Left Ear: External ear normal.   Nose: Nose normal.   Mouth/Throat: No oropharyngeal exudate.   Neck: Normal range of motion. Neck supple. No tracheal deviation present.   Cardiovascular: Normal rate, regular rhythm, normal heart sounds and intact distal pulses.   No murmur heard.  Pulmonary/Chest: Effort normal and breath sounds normal. He has no wheezes. He has no rales.   Abdominal: Soft. Bowel sounds are normal. He exhibits no mass. There is no tenderness. There is no rigidity, no rebound, no guarding and no CVA tenderness.   Musculoskeletal: He exhibits no edema.   Lymphadenopathy:     He has no cervical adenopathy.   Neurological: He is oriented to person, place, and time. He has normal strength. He displays no atrophy. No sensory deficit. Gait normal.   Reflex Scores:       Patellar reflexes are 2+ on the right side and 2+ on the left side.  Skin: He is not diaphoretic.   Vitals reviewed.    Lab Visit on 09/04/2019   Component Date Value Ref Range Status    Fecal Immunochemical Test (iFOBT) 08/26/2019 Negative  Negative Final   Lab Visit on 08/23/2019   Component Date Value Ref Range Status    Microalbum.,U,Random 08/23/2019 78.0  ug/mL Final    Creatinine, Random Ur 08/23/2019 39.0  23.0 - 375.0 mg/dL Final    Comment: The random urine reference ranges provided were established   for 24 hour urine collections.  No reference ranges exist for  random urine specimens.  Correlate clinically.      Microalb Creat Ratio 08/23/2019 200.0* 0.0 - 30.0 ug/mg Final   Lab Visit on 08/23/2019   Component Date Value Ref Range Status    Sodium 08/23/2019 139  136 - 145 mmol/L Final    Potassium 08/23/2019 4.1  3.5 - 5.1 mmol/L Final    Chloride 08/23/2019 104  95 - 110 mmol/L Final    CO2 08/23/2019 23  23 - 29 mmol/L Final    Glucose 08/23/2019 207* 70 - 110 mg/dL Final    BUN, Bld 08/23/2019  34* 6 - 20 mg/dL Final    Creatinine 08/23/2019 2.3* 0.5 - 1.4 mg/dL Final    Calcium 08/23/2019 9.5  8.7 - 10.5 mg/dL Final    Total Protein 08/23/2019 8.8* 6.0 - 8.4 g/dL Final    Albumin 08/23/2019 4.2  3.5 - 5.2 g/dL Final    Total Bilirubin 08/23/2019 0.6  0.1 - 1.0 mg/dL Final    Comment: For infants and newborns, interpretation of results should be based  on gestational age, weight and in agreement with clinical  observations.  Premature Infant recommended reference ranges:  Up to 24 hours.............<8.0 mg/dL  Up to 48 hours............<12.0 mg/dL  3-5 days..................<15.0 mg/dL  6-29 days.................<15.0 mg/dL      Alkaline Phosphatase 08/23/2019 72  55 - 135 U/L Final    AST 08/23/2019 41* 10 - 40 U/L Final    ALT 08/23/2019 38  10 - 44 U/L Final    Anion Gap 08/23/2019 12  8 - 16 mmol/L Final    eGFR if  08/23/2019 37* >60 mL/min/1.73 m^2 Final    eGFR if non  08/23/2019 32* >60 mL/min/1.73 m^2 Final    Comment: Calculation used to obtain the estimated glomerular filtration  rate (eGFR) is the CKD-EPI equation.       Hemoglobin A1C 08/23/2019 8.0* 4.0 - 5.6 % Final    Comment: ADA Screening Guidelines:  5.7-6.4%  Consistent with prediabetes  >or=6.5%  Consistent with diabetes  High levels of fetal hemoglobin interfere with the HbA1C  assay. Heterozygous hemoglobin variants (HbS, HgC, etc)do  not significantly interfere with this assay.   However, presence of multiple variants may affect accuracy.      Estimated Avg Glucose 08/23/2019 183* 68 - 131 mg/dL Final    Cholesterol 08/23/2019 170  120 - 199 mg/dL Final    Comment: The National Cholesterol Education Program (NCEP) has set the  following guidelines (reference ranges) for Cholesterol:  Optimal.....................<200 mg/dL  Borderline High.............200-239 mg/dL  High........................> or = 240 mg/dL      Triglycerides 08/23/2019 652* 30 - 150 mg/dL Final    Comment: The  National Cholesterol Education Program (NCEP) has set the  following guidelines (reference values) for triglycerides:  Normal......................<150 mg/dL  Borderline High.............150-199 mg/dL  High........................200-499 mg/dL      HDL 08/23/2019 22* 40 - 75 mg/dL Final    Comment: The National Cholesterol Education Program (NCEP) has set the  following guidelines (reference values) for HDL Cholesterol:  Low...............<40 mg/dL  Optimal...........>60 mg/dL      LDL Cholesterol 08/23/2019 Invalid, Trig>400.0  63.0 - 159.0 mg/dL Final    Comment: The National Cholesterol Education Program (NCEP) has set the  following guidelines (reference values) for LDL Cholesterol:  Optimal.......................<130 mg/dL  Borderline High...............130-159 mg/dL  High..........................160-189 mg/dL  Very High.....................>190 mg/dL      Hdl/Cholesterol Ratio 08/23/2019 12.9* 20.0 - 50.0 % Final    Total Cholesterol/HDL Ratio 08/23/2019 7.7* 2.0 - 5.0 Final    Non-HDL Cholesterol 08/23/2019 148  mg/dL Final    Comment: Risk category and Non-HDL cholesterol goals:  Coronary heart disease (CHD)or equivalent (10-year risk of CHD >20%):  Non-HDL cholesterol goal     <130 mg/dL  Two or more CHD risk factors and 10-year risk of CHD <= 20%:  Non-HDL cholesterol goal     <160 mg/dL  0 to 1 CHD risk factor:  Non-HDL cholesterol goal     <190 mg/dL      PTH, Intact 08/23/2019 161.2* 9.0 - 77.0 pg/mL Final    WBC 08/23/2019 8.64  3.90 - 12.70 K/uL Final    RBC 08/23/2019 3.81* 4.60 - 6.20 M/uL Final    Hemoglobin 08/23/2019 11.0* 14.0 - 18.0 g/dL Final    Hematocrit 08/23/2019 33.7* 40.0 - 54.0 % Final    Mean Corpuscular Volume 08/23/2019 89  82 - 98 fL Final    Mean Corpuscular Hemoglobin 08/23/2019 28.9  27.0 - 31.0 pg Final    Mean Corpuscular Hemoglobin Conc 08/23/2019 32.6  32.0 - 36.0 g/dL Final    RDW 08/23/2019 13.7  11.5 - 14.5 % Final    Platelets 08/23/2019 231  150 - 350  K/uL Final    MPV 08/23/2019 11.8  9.2 - 12.9 fL Final    Gran # (ANC) 08/23/2019 5.4  1.8 - 7.7 K/uL Final    Lymph # 08/23/2019 2.0  1.0 - 4.8 K/uL Final    Mono # 08/23/2019 0.9  0.3 - 1.0 K/uL Final    Eos # 08/23/2019 0.3  0.0 - 0.5 K/uL Final    Baso # 08/23/2019 0.03  0.00 - 0.20 K/uL Final    Gran% 08/23/2019 62.3  38.0 - 73.0 % Final    Lymph% 08/23/2019 23.5  18.0 - 48.0 % Final    Mono% 08/23/2019 10.6  4.0 - 15.0 % Final    Eosinophil% 08/23/2019 3.5  0.0 - 8.0 % Final    Basophil% 08/23/2019 0.3  0.0 - 1.9 % Final    Differential Method 08/23/2019 Automated   Final    TSH 08/23/2019 2.495  0.400 - 4.000 uIU/mL Final         Assessment/Plan:  Nicholas was seen today for diabetes, hypertension and follow-up.    Diagnoses and all orders for this visit:    Type 2 diabetes mellitus with diabetic neuropathy, without long-term current use of insulin  -     semaglutide (OZEMPIC) 0.25 mg or 0.5 mg(2 mg/1.5 mL) PnIj; Inject 0.25 mg into the skin every 7 days for 28 days, THEN 0.5 mg every 7 days.  I discussed with the patient given his renal function he cannot continue taking Janumet, as it is contraindicated with such a low renal clearance.  Discussed starting Ozempic and increasing dose every four weeks to maximum dose.  Strongly encouraged patient to check his fingerstick glucose levels more regularly.  Patient to continue glipizide for now but I discussed with him that as we increase the Ozempic, he may not need this as much either.    CKD (chronic kidney disease) stage 3, GFR 30-59 ml/min  -     Basic metabolic panel; Future  Given great decrease in renal function is still short bedtime will repeat renal function test.  Patient advised to hydrate prior to the test.  If testing confirms the decrease patient will need to see Nephrology sooner than scheduled.    Dyslipidemia  -     Lipid panel; Future  Patient to do lipid 12 hours fasting to confirm his lipid panel and adjust his  medications.              -Gurwinder Olmedo Jr., MD, AAHIVS          This office note has been dictated.  This dictation has been generated using M-Modal Fluency Direct dictation; some phonetic errors may occur.

## 2019-09-27 ENCOUNTER — OFFICE VISIT (OUTPATIENT)
Dept: FAMILY MEDICINE | Facility: CLINIC | Age: 49
End: 2019-09-27
Payer: COMMERCIAL

## 2019-09-27 VITALS
TEMPERATURE: 98 F | OXYGEN SATURATION: 97 % | SYSTOLIC BLOOD PRESSURE: 127 MMHG | HEART RATE: 64 BPM | HEIGHT: 67 IN | WEIGHT: 201.25 LBS | BODY MASS INDEX: 31.59 KG/M2 | DIASTOLIC BLOOD PRESSURE: 84 MMHG | RESPIRATION RATE: 17 BRPM

## 2019-09-27 DIAGNOSIS — N18.30 CKD (CHRONIC KIDNEY DISEASE) STAGE 3, GFR 30-59 ML/MIN: ICD-10-CM

## 2019-09-27 DIAGNOSIS — E11.40 TYPE 2 DIABETES MELLITUS WITH DIABETIC NEUROPATHY, WITHOUT LONG-TERM CURRENT USE OF INSULIN: Primary | ICD-10-CM

## 2019-09-27 DIAGNOSIS — Z23 NEED FOR VACCINATION: ICD-10-CM

## 2019-09-27 DIAGNOSIS — I10 BENIGN HYPERTENSION: ICD-10-CM

## 2019-09-27 DIAGNOSIS — E78.5 DYSLIPIDEMIA: ICD-10-CM

## 2019-09-27 PROCEDURE — 90686 IIV4 VACC NO PRSV 0.5 ML IM: CPT | Mod: S$GLB,,, | Performed by: FAMILY MEDICINE

## 2019-09-27 PROCEDURE — 99214 OFFICE O/P EST MOD 30 MIN: CPT | Mod: 25,S$GLB,, | Performed by: FAMILY MEDICINE

## 2019-09-27 PROCEDURE — 90714 TD VACC NO PRESV 7 YRS+ IM: CPT | Mod: S$GLB,,, | Performed by: FAMILY MEDICINE

## 2019-09-27 PROCEDURE — 90471 IMMUNIZATION ADMIN: CPT | Mod: S$GLB,,, | Performed by: FAMILY MEDICINE

## 2019-09-27 PROCEDURE — 2024F PR 7 FIELD PHOTOS WITH INTERP/ REVIEW: ICD-10-PCS | Mod: S$GLB,,, | Performed by: FAMILY MEDICINE

## 2019-09-27 PROCEDURE — 90686 FLU VACCINE (QUAD) GREATER THAN OR EQUAL TO 3YO PRESERVATIVE FREE IM: ICD-10-PCS | Mod: S$GLB,,, | Performed by: FAMILY MEDICINE

## 2019-09-27 PROCEDURE — 99214 PR OFFICE/OUTPT VISIT, EST, LEVL IV, 30-39 MIN: ICD-10-PCS | Mod: 25,S$GLB,, | Performed by: FAMILY MEDICINE

## 2019-09-27 PROCEDURE — 99999 PR PBB SHADOW E&M-EST. PATIENT-LVL III: ICD-10-PCS | Mod: PBBFAC,,, | Performed by: FAMILY MEDICINE

## 2019-09-27 PROCEDURE — 99999 PR PBB SHADOW E&M-EST. PATIENT-LVL III: CPT | Mod: PBBFAC,,, | Performed by: FAMILY MEDICINE

## 2019-09-27 PROCEDURE — 90471 FLU VACCINE (QUAD) GREATER THAN OR EQUAL TO 3YO PRESERVATIVE FREE IM: ICD-10-PCS | Mod: S$GLB,,, | Performed by: FAMILY MEDICINE

## 2019-09-27 PROCEDURE — 90472 IMMUNIZATION ADMIN EACH ADD: CPT | Mod: S$GLB,,, | Performed by: FAMILY MEDICINE

## 2019-09-27 PROCEDURE — 2024F 7 FLD RTA PHOTO EVC RTNOPTHY: CPT | Mod: S$GLB,,, | Performed by: FAMILY MEDICINE

## 2019-09-27 PROCEDURE — 90714 TD VACCINE GREATER THAN OR EQUAL TO 7YO PRESERVATIVE FREE IM: ICD-10-PCS | Mod: S$GLB,,, | Performed by: FAMILY MEDICINE

## 2019-09-27 PROCEDURE — 90472 TD VACCINE GREATER THAN OR EQUAL TO 7YO PRESERVATIVE FREE IM: ICD-10-PCS | Mod: S$GLB,,, | Performed by: FAMILY MEDICINE

## 2019-09-27 NOTE — PROGRESS NOTES
Routine Office Visit    Patient Name: Nicholas Nugent    : 1970  MRN: [id number]    Subjective:  Nicholas is a 49 y.o. male who presents today for:   Chief Complaint   Patient presents with    Diabetes    Follow-up     4 wk f/u     49-year-old with diabetes, hypertension, hyperlipidemia, and stage 3 chronic kidney disease comes in for follow-up on his diabetes since starting Ozempic.  He has taken three doses of are.  He reports no side effects.  He has not been taking his sugars as he needs glucose test strips.  However he reports that he feels more energetic.  He has no new complaints.  In terms of his cholesterol, reports that he has been working on his diet, has been compliant with taking Vascepa 4 grams daily and atorvastatin 80 milligrams.    Past Medical History  Past Medical History:   Diagnosis Date    Basal cell carcinoma 2018    lip    CKD (chronic kidney disease) stage 3, GFR 30-59 ml/min     Diabetes mellitus     Diabetes mellitus type I     Hyperlipidemia     Hypertension     Kidney stones        Past Surgical History  Past Surgical History:   Procedure Laterality Date    INNER EAR SURGERY          Family History  History reviewed. No pertinent family history.    Social History  Social History     Socioeconomic History    Marital status: Significant Other     Spouse name: Not on file    Number of children: Not on file    Years of education: Not on file    Highest education level: Not on file   Occupational History    Not on file   Social Needs    Financial resource strain: Not on file    Food insecurity:     Worry: Not on file     Inability: Not on file    Transportation needs:     Medical: Not on file     Non-medical: Not on file   Tobacco Use    Smoking status: Never Smoker    Smokeless tobacco: Never Used   Substance and Sexual Activity    Alcohol use: Yes     Alcohol/week: 0.0 standard drinks     Comment: occas    Drug use: No    Sexual activity: Not Currently   Lifestyle     "Physical activity:     Days per week: Not on file     Minutes per session: Not on file    Stress: Not on file   Relationships    Social connections:     Talks on phone: Not on file     Gets together: Not on file     Attends Jain service: Not on file     Active member of club or organization: Not on file     Attends meetings of clubs or organizations: Not on file     Relationship status: Not on file   Other Topics Concern    Not on file   Social History Narrative    Not on file       Current Medications  Current Outpatient Medications on File Prior to Visit   Medication Sig Dispense Refill    aspirin (ECOTRIN) 81 MG EC tablet Take 81 mg by mouth.      atorvastatin (LIPITOR) 80 MG tablet Take 80 mg by mouth once daily.       furosemide (LASIX) 80 MG tablet Take 80 mg by mouth every morning.   3    glipiZIDE (GLUCOTROL) 5 MG TR24 Take 5 mg by mouth 2 (two) times daily.   3    losartan (COZAAR) 100 MG tablet Take 100 mg by mouth once daily.  3    NIFEdipine (ADALAT CC) 60 MG TbSR   1    semaglutide (OZEMPIC) 0.25 mg or 0.5 mg(2 mg/1.5 mL) PnIj Inject 0.25 mg into the skin every 7 days for 28 days, THEN 0.5 mg every 7 days. 8 Syringe 0    VASCEPA 1 gram Cap Take 2 capsules by mouth 2 (two) times daily.  3     No current facility-administered medications on file prior to visit.        Allergies   Review of patient's allergies indicates:  No Known Allergies    Review of Systems   Constitutional: Negative for chills and fever.   Respiratory: Negative for shortness of breath and wheezing.    Cardiovascular: Negative for chest pain and palpitations.   Gastrointestinal: Negative for abdominal pain.   Genitourinary: Negative for dysuria.     /84 (BP Location: Left arm, Patient Position: Sitting, BP Method: Medium (Automatic))   Pulse 64   Temp 98.4 °F (36.9 °C) (Oral)   Resp 17   Ht 5' 7" (1.702 m)   Wt 91.3 kg (201 lb 4.5 oz)   SpO2 97%   BMI 31.52 kg/m²     Physical Exam   Constitutional: He " appears well-developed. No distress.   HENT:   Head: Normocephalic and atraumatic.   Eyes: Pupils are equal, round, and reactive to light. EOM are normal.   Neck: Normal range of motion. Neck supple.   Cardiovascular: Normal rate, regular rhythm, normal heart sounds and intact distal pulses.   Pulmonary/Chest: Effort normal and breath sounds normal.   Abdominal: Soft. Bowel sounds are normal.         Assessment/Plan:  Nicholas was seen today for diabetes and follow-up.    Diagnoses and all orders for this visit:    Type 2 diabetes mellitus with diabetic neuropathy, without long-term current use of insulin  -     blood sugar diagnostic Strp; Test blood sugar with glucometer 3 times daily and PRN  -     Comprehensive metabolic panel; Future  -     Hemoglobin A1c; Future  Will do Ozempic 0.25mg  for one more dose, then increase to 0.5 milligrams for four doses and then increase to 1 milligram weekly.  Follow up in office in eight weeks.  Test strips to pharmacy.    Benign hypertension  -     Comprehensive metabolic panel; Future  Blood pressure improved.    CKD (chronic kidney disease) stage 3, GFR 30-59 ml/min  -     Comprehensive metabolic panel; Future  Will recheck renal function with next labs.    Dyslipidemia  -     Comprehensive metabolic panel; Future  -     Lipid panel; Future  Check lipids with next labs.    Need for vaccination  -     Influenza - Quadrivalent (6 months+) (PF)  -     (In Office Administered) Td Vaccine - Preservative Free                -Gurwinder Olmedo Jr., MD, AAHIVS          This office note has been dictated.  This dictation has been generated using M-Modal Fluency Direct dictation; some phonetic errors may occur.

## 2019-10-07 LAB
CREATININE URINE: 188
MICROALBUMIN/CREATININE RATIO: 188 UG/MG
PROTEIN/CREATININE RATIO: 38

## 2019-11-27 ENCOUNTER — LAB VISIT (OUTPATIENT)
Dept: LAB | Facility: HOSPITAL | Age: 49
End: 2019-11-27
Attending: FAMILY MEDICINE
Payer: COMMERCIAL

## 2019-11-27 DIAGNOSIS — I10 BENIGN HYPERTENSION: ICD-10-CM

## 2019-11-27 DIAGNOSIS — N18.30 CKD (CHRONIC KIDNEY DISEASE) STAGE 3, GFR 30-59 ML/MIN: ICD-10-CM

## 2019-11-27 DIAGNOSIS — E11.40 TYPE 2 DIABETES MELLITUS WITH DIABETIC NEUROPATHY, WITHOUT LONG-TERM CURRENT USE OF INSULIN: ICD-10-CM

## 2019-11-27 DIAGNOSIS — E78.5 DYSLIPIDEMIA: ICD-10-CM

## 2019-11-27 LAB
ALBUMIN SERPL BCP-MCNC: 4.2 G/DL (ref 3.5–5.2)
ALP SERPL-CCNC: 101 U/L (ref 55–135)
ALT SERPL W/O P-5'-P-CCNC: 38 U/L (ref 10–44)
ANION GAP SERPL CALC-SCNC: 10 MMOL/L (ref 8–16)
AST SERPL-CCNC: 35 U/L (ref 10–40)
BILIRUB SERPL-MCNC: 0.7 MG/DL (ref 0.1–1)
BUN SERPL-MCNC: 29 MG/DL (ref 6–20)
CALCIUM SERPL-MCNC: 10.1 MG/DL (ref 8.7–10.5)
CHLORIDE SERPL-SCNC: 106 MMOL/L (ref 95–110)
CHOLEST SERPL-MCNC: 66 MG/DL (ref 120–199)
CHOLEST/HDLC SERPL: 3.1 {RATIO} (ref 2–5)
CO2 SERPL-SCNC: 24 MMOL/L (ref 23–29)
CREAT SERPL-MCNC: 2.2 MG/DL (ref 0.5–1.4)
EST. GFR  (AFRICAN AMERICAN): 39 ML/MIN/1.73 M^2
EST. GFR  (NON AFRICAN AMERICAN): 33 ML/MIN/1.73 M^2
ESTIMATED AVG GLUCOSE: 134 MG/DL (ref 68–131)
GLUCOSE SERPL-MCNC: 139 MG/DL (ref 70–110)
HBA1C MFR BLD HPLC: 6.3 % (ref 4–5.6)
HDLC SERPL-MCNC: 21 MG/DL (ref 40–75)
HDLC SERPL: 31.8 % (ref 20–50)
LDLC SERPL CALC-MCNC: ABNORMAL MG/DL (ref 63–159)
NONHDLC SERPL-MCNC: 45 MG/DL
POTASSIUM SERPL-SCNC: 4.2 MMOL/L (ref 3.5–5.1)
PROT SERPL-MCNC: 8.6 G/DL (ref 6–8.4)
SODIUM SERPL-SCNC: 140 MMOL/L (ref 136–145)
TRIGL SERPL-MCNC: 320 MG/DL (ref 30–150)

## 2019-11-27 PROCEDURE — 80053 COMPREHEN METABOLIC PANEL: CPT

## 2019-11-27 PROCEDURE — 83036 HEMOGLOBIN GLYCOSYLATED A1C: CPT

## 2019-11-27 PROCEDURE — 80061 LIPID PANEL: CPT

## 2019-11-27 PROCEDURE — 36415 COLL VENOUS BLD VENIPUNCTURE: CPT | Mod: PN

## 2019-12-02 ENCOUNTER — OFFICE VISIT (OUTPATIENT)
Dept: FAMILY MEDICINE | Facility: CLINIC | Age: 49
End: 2019-12-02
Payer: COMMERCIAL

## 2019-12-02 VITALS
OXYGEN SATURATION: 98 % | HEART RATE: 102 BPM | TEMPERATURE: 98 F | SYSTOLIC BLOOD PRESSURE: 126 MMHG | DIASTOLIC BLOOD PRESSURE: 80 MMHG | RESPIRATION RATE: 18 BRPM

## 2019-12-02 DIAGNOSIS — I10 BENIGN HYPERTENSION: ICD-10-CM

## 2019-12-02 DIAGNOSIS — E11.40 TYPE 2 DIABETES MELLITUS WITH DIABETIC NEUROPATHY, WITHOUT LONG-TERM CURRENT USE OF INSULIN: Primary | ICD-10-CM

## 2019-12-02 DIAGNOSIS — E78.2 MIXED DYSLIPIDEMIA: ICD-10-CM

## 2019-12-02 DIAGNOSIS — N18.30 CKD (CHRONIC KIDNEY DISEASE) STAGE 3, GFR 30-59 ML/MIN: ICD-10-CM

## 2019-12-02 PROCEDURE — 2024F 7 FLD RTA PHOTO EVC RTNOPTHY: CPT | Mod: S$GLB,,, | Performed by: FAMILY MEDICINE

## 2019-12-02 PROCEDURE — 99214 OFFICE O/P EST MOD 30 MIN: CPT | Mod: S$GLB,,, | Performed by: FAMILY MEDICINE

## 2019-12-02 PROCEDURE — 99214 PR OFFICE/OUTPT VISIT, EST, LEVL IV, 30-39 MIN: ICD-10-PCS | Mod: S$GLB,,, | Performed by: FAMILY MEDICINE

## 2019-12-02 PROCEDURE — 2024F PR 7 FIELD PHOTOS WITH INTERP/ REVIEW: ICD-10-PCS | Mod: S$GLB,,, | Performed by: FAMILY MEDICINE

## 2019-12-02 PROCEDURE — 99999 PR PBB SHADOW E&M-EST. PATIENT-LVL III: ICD-10-PCS | Mod: PBBFAC,,, | Performed by: FAMILY MEDICINE

## 2019-12-02 PROCEDURE — 99999 PR PBB SHADOW E&M-EST. PATIENT-LVL III: CPT | Mod: PBBFAC,,, | Performed by: FAMILY MEDICINE

## 2019-12-02 RX ORDER — LANOLIN ALCOHOL/MO/W.PET/CERES
500 CREAM (GRAM) TOPICAL DAILY
Qty: 90 TABLET | Refills: 0 | Status: SHIPPED | OUTPATIENT
Start: 2019-12-02 | End: 2020-08-06 | Stop reason: CLARIF

## 2019-12-02 RX ORDER — GLIPIZIDE 5 MG/1
5 TABLET, FILM COATED, EXTENDED RELEASE ORAL DAILY
COMMUNITY
Start: 2019-10-24 | End: 2022-07-20

## 2019-12-02 RX ORDER — FUROSEMIDE 40 MG/1
40 TABLET ORAL DAILY
COMMUNITY
Start: 2019-10-15 | End: 2021-03-18

## 2019-12-02 RX ORDER — VALSARTAN 320 MG/1
320 TABLET ORAL DAILY
COMMUNITY
Start: 2019-10-15 | End: 2022-10-10 | Stop reason: SDUPTHER

## 2019-12-02 NOTE — PROGRESS NOTES
Routine Office Visit    Patient Name: Nicholas Nugent    : 1970  MRN: [id number]    Subjective:  Nicholas is a 49 y.o. male who presents today for:   Chief Complaint   Patient presents with    Follow-up     pt not fasting    Diabetes     49-year-old male with diabetes, CKD three, mixed dyslipidemia, and hypertension comes in for routine follow-up on these.  Since he last saw me he has follow-up with Nephrology and had some medications adjusted.  His Lasix was reduced from 80 milligrams to 40 milligrams, and his losartan was switched to valsartan because of medication recall.  He reports that he is tolerating these changes well.  In addition his Vascepa was changed to Repatha as his triglycerides were still extremely elevated.  The patient reports that he using his IS and pink every Saturday.  He is currently on 0.5 milligrams weekly.  He reports tolerating this well.  He reports no side effects.  Reports that overall his sugars have been much better.    Past Medical History  Past Medical History:   Diagnosis Date    Basal cell carcinoma 2018    lip    CKD (chronic kidney disease) stage 3, GFR 30-59 ml/min     Diabetes mellitus     Diabetes mellitus type I     Hyperlipidemia     Hypertension     Kidney stones        Past Surgical History  Past Surgical History:   Procedure Laterality Date    INNER EAR SURGERY          Family History  History reviewed. No pertinent family history.    Social History  Social History     Socioeconomic History    Marital status: Significant Other     Spouse name: Not on file    Number of children: Not on file    Years of education: Not on file    Highest education level: Not on file   Occupational History    Not on file   Social Needs    Financial resource strain: Not on file    Food insecurity:     Worry: Not on file     Inability: Not on file    Transportation needs:     Medical: Not on file     Non-medical: Not on file   Tobacco Use    Smoking status: Never Smoker     Smokeless tobacco: Never Used   Substance and Sexual Activity    Alcohol use: Yes     Alcohol/week: 0.0 standard drinks     Comment: occas    Drug use: No    Sexual activity: Not Currently   Lifestyle    Physical activity:     Days per week: Not on file     Minutes per session: Not on file    Stress: Not on file   Relationships    Social connections:     Talks on phone: Not on file     Gets together: Not on file     Attends Buddhist service: Not on file     Active member of club or organization: Not on file     Attends meetings of clubs or organizations: Not on file     Relationship status: Not on file   Other Topics Concern    Not on file   Social History Narrative    Not on file       Current Medications  Current Outpatient Medications on File Prior to Visit   Medication Sig Dispense Refill    evolocumab (REPATHA SURECLICK) 140 mg/mL PnIj Inject 140 mg into the skin every 14 (fourteen) days.      furosemide (LASIX) 40 MG tablet Take 40 mg by mouth once daily.      glipiZIDE (GLUCOTROL) 5 MG TR24 Take 5 mg by mouth once daily.      semaglutide (OZEMPIC) 0.25 mg or 0.5 mg(2 mg/1.5 mL) PnIj Inject 0.5 mg into the skin once a week.      valsartan (DIOVAN) 320 MG tablet Take 320 mg by mouth once daily.      aspirin (ECOTRIN) 81 MG EC tablet Take 81 mg by mouth.      atorvastatin (LIPITOR) 80 MG tablet Take 80 mg by mouth once daily.       blood sugar diagnostic Strp Test blood sugar with glucometer 3 times daily and  strip 11    NIFEdipine (ADALAT CC) 60 MG TbSR   1    VASCEPA 1 gram Cap Take 2 capsules by mouth 2 (two) times daily.  3    [DISCONTINUED] furosemide (LASIX) 80 MG tablet Take 80 mg by mouth every morning.   3    [DISCONTINUED] glipiZIDE (GLUCOTROL) 5 MG TR24 Take 5 mg by mouth 2 (two) times daily.   3    [DISCONTINUED] losartan (COZAAR) 100 MG tablet Take 100 mg by mouth once daily.  3     No current facility-administered medications on file prior to visit.         Allergies   Review of patient's allergies indicates:  No Known Allergies    Review of Systems   Constitutional: Negative for chills and fever.   Respiratory: Negative for shortness of breath and wheezing.    Cardiovascular: Negative for chest pain and palpitations.   Gastrointestinal: Negative for abdominal pain.   Genitourinary: Negative for dysuria.   Neurological: Negative for dizziness, syncope and headaches.     /80 (BP Location: Left arm, Patient Position: Sitting, BP Method: Medium (Manual))   Pulse 102   Temp 97.9 °F (36.6 °C) (Oral)   Resp 18   SpO2 98%     Lab Visit on 11/27/2019   Component Date Value Ref Range Status    Sodium 11/27/2019 140  136 - 145 mmol/L Final    Potassium 11/27/2019 4.2  3.5 - 5.1 mmol/L Final    Chloride 11/27/2019 106  95 - 110 mmol/L Final    CO2 11/27/2019 24  23 - 29 mmol/L Final    Glucose 11/27/2019 139* 70 - 110 mg/dL Final    BUN, Bld 11/27/2019 29* 6 - 20 mg/dL Final    Creatinine 11/27/2019 2.2* 0.5 - 1.4 mg/dL Final    Calcium 11/27/2019 10.1  8.7 - 10.5 mg/dL Final    Total Protein 11/27/2019 8.6* 6.0 - 8.4 g/dL Final    Albumin 11/27/2019 4.2  3.5 - 5.2 g/dL Final    Total Bilirubin 11/27/2019 0.7  0.1 - 1.0 mg/dL Final    Comment: For infants and newborns, interpretation of results should be based  on gestational age, weight and in agreement with clinical  observations.  Premature Infant recommended reference ranges:  Up to 24 hours.............<8.0 mg/dL  Up to 48 hours............<12.0 mg/dL  3-5 days..................<15.0 mg/dL  6-29 days.................<15.0 mg/dL      Alkaline Phosphatase 11/27/2019 101  55 - 135 U/L Final    AST 11/27/2019 35  10 - 40 U/L Final    ALT 11/27/2019 38  10 - 44 U/L Final    Anion Gap 11/27/2019 10  8 - 16 mmol/L Final    eGFR if  11/27/2019 39* >60 mL/min/1.73 m^2 Final    eGFR if non  11/27/2019 33* >60 mL/min/1.73 m^2 Final    Comment: Calculation used to obtain  the estimated glomerular filtration  rate (eGFR) is the CKD-EPI equation.       Hemoglobin A1C 11/27/2019 6.3* 4.0 - 5.6 % Final    Comment: ADA Screening Guidelines:  5.7-6.4%  Consistent with prediabetes  >or=6.5%  Consistent with diabetes  High levels of fetal hemoglobin interfere with the HbA1C  assay. Heterozygous hemoglobin variants (HbS, HgC, etc)do  not significantly interfere with this assay.   However, presence of multiple variants may affect accuracy.      Estimated Avg Glucose 11/27/2019 134* 68 - 131 mg/dL Final    Cholesterol 11/27/2019 66* 120 - 199 mg/dL Final    Comment: The National Cholesterol Education Program (NCEP) has set the  following guidelines (reference ranges) for Cholesterol:  Optimal.....................<200 mg/dL  Borderline High.............200-239 mg/dL  High........................> or = 240 mg/dL      Triglycerides 11/27/2019 320* 30 - 150 mg/dL Final    Comment: The National Cholesterol Education Program (NCEP) has set the  following guidelines (reference values) for triglycerides:  Normal......................<150 mg/dL  Borderline High.............150-199 mg/dL  High........................200-499 mg/dL      HDL 11/27/2019 21* 40 - 75 mg/dL Final    Comment: The National Cholesterol Education Program (NCEP) has set the  following guidelines (reference values) for HDL Cholesterol:  Low...............<40 mg/dL  Optimal...........>60 mg/dL      LDL Cholesterol 11/27/2019 Unable to calculate  63.0 - 159.0 mg/dL Final    Comment: The National Cholesterol Education Program (NCEP) has set the  following guidelines (reference values) for LDL Cholesterol:  Optimal.......................<130 mg/dL  Borderline High...............130-159 mg/dL  High..........................160-189 mg/dL  Very High.....................>190 mg/dL      Hdl/Cholesterol Ratio 11/27/2019 31.8  20.0 - 50.0 % Final    Total Cholesterol/HDL Ratio 11/27/2019 3.1  2.0 - 5.0 Final    Non-HDL Cholesterol  11/27/2019 45  mg/dL Final    Comment: Risk category and Non-HDL cholesterol goals:  Coronary heart disease (CHD)or equivalent (10-year risk of CHD >20%):  Non-HDL cholesterol goal     <130 mg/dL  Two or more CHD risk factors and 10-year risk of CHD <= 20%:  Non-HDL cholesterol goal     <160 mg/dL  0 to 1 CHD risk factor:  Non-HDL cholesterol goal     <190 mg/dL           Assessment/Plan:  Nicholas was seen today for follow-up and diabetes.    Diagnoses and all orders for this visit:    Type 2 diabetes mellitus with diabetic neuropathy, without long-term current use of insulin  A1c much improved. Continue Ozempic and glipizide.  Follow up in 3 months with labs to be done prior.    Benign hypertension  BP much improved.   Continue current regimen.    CKD (chronic kidney disease) stage 3, GFR 30-59 ml/min  Kidney function stable.    Mixed dyslipidemia  -     niacin 500 mg TbSR; Take 500 mg by mouth once daily.  -     Lipid panel; Future  Continue Statin, Repatha, and will add niacin.  Discussed niacin side effects. Patient to take with dinner, and he is to take his aspirin 1/2 hour prior.  Recheck lipids fasting in 4 weeks.               -Gurwinder Olmedo Jr., MD, AAHIVS          This office note has been dictated.  This dictation has been generated using M-Modal Fluency Direct dictation; some phonetic errors may occur.

## 2019-12-07 DIAGNOSIS — E11.40 TYPE 2 DIABETES MELLITUS WITH DIABETIC NEUROPATHY, WITHOUT LONG-TERM CURRENT USE OF INSULIN: ICD-10-CM

## 2019-12-09 RX ORDER — SEMAGLUTIDE 1.34 MG/ML
INJECTION, SOLUTION SUBCUTANEOUS
Qty: 1.5 SYRINGE | Refills: 2 | Status: SHIPPED | OUTPATIENT
Start: 2019-12-09 | End: 2020-03-03

## 2020-01-07 ENCOUNTER — LAB VISIT (OUTPATIENT)
Dept: LAB | Facility: HOSPITAL | Age: 50
End: 2020-01-07
Attending: FAMILY MEDICINE
Payer: COMMERCIAL

## 2020-01-07 DIAGNOSIS — E78.2 MIXED DYSLIPIDEMIA: ICD-10-CM

## 2020-01-07 LAB
CHOLEST SERPL-MCNC: 77 MG/DL (ref 120–199)
CHOLEST/HDLC SERPL: 3.7 {RATIO} (ref 2–5)
HDLC SERPL-MCNC: 21 MG/DL (ref 40–75)
HDLC SERPL: 27.3 % (ref 20–50)
LDLC SERPL CALC-MCNC: ABNORMAL MG/DL (ref 63–159)
NONHDLC SERPL-MCNC: 56 MG/DL
TRIGL SERPL-MCNC: 324 MG/DL (ref 30–150)

## 2020-01-07 PROCEDURE — 80061 LIPID PANEL: CPT

## 2020-01-07 PROCEDURE — 36415 COLL VENOUS BLD VENIPUNCTURE: CPT | Mod: PN

## 2020-03-02 ENCOUNTER — OFFICE VISIT (OUTPATIENT)
Dept: FAMILY MEDICINE | Facility: CLINIC | Age: 50
End: 2020-03-02
Payer: COMMERCIAL

## 2020-03-02 VITALS
DIASTOLIC BLOOD PRESSURE: 72 MMHG | HEART RATE: 96 BPM | TEMPERATURE: 98 F | RESPIRATION RATE: 16 BRPM | BODY MASS INDEX: 31.77 KG/M2 | OXYGEN SATURATION: 96 % | WEIGHT: 202.81 LBS | SYSTOLIC BLOOD PRESSURE: 116 MMHG

## 2020-03-02 DIAGNOSIS — N18.30 CKD (CHRONIC KIDNEY DISEASE) STAGE 3, GFR 30-59 ML/MIN: ICD-10-CM

## 2020-03-02 DIAGNOSIS — E21.0 PRIMARY HYPERPARATHYROIDISM: ICD-10-CM

## 2020-03-02 DIAGNOSIS — N52.1 TYPE 2 DIABETES MELLITUS WITH NEUROPATHY CAUSING ERECTILE DYSFUNCTION: Primary | ICD-10-CM

## 2020-03-02 DIAGNOSIS — I10 BENIGN HYPERTENSION: ICD-10-CM

## 2020-03-02 DIAGNOSIS — E11.40 TYPE 2 DIABETES MELLITUS WITH NEUROPATHY CAUSING ERECTILE DYSFUNCTION: Primary | ICD-10-CM

## 2020-03-02 DIAGNOSIS — E78.2 MIXED DYSLIPIDEMIA: ICD-10-CM

## 2020-03-02 DIAGNOSIS — Z11.4 ENCOUNTER FOR SCREENING FOR HIV: ICD-10-CM

## 2020-03-02 DIAGNOSIS — N52.9 ERECTILE DYSFUNCTION, UNSPECIFIED ERECTILE DYSFUNCTION TYPE: ICD-10-CM

## 2020-03-02 PROCEDURE — 99999 PR PBB SHADOW E&M-EST. PATIENT-LVL III: ICD-10-PCS | Mod: PBBFAC,,, | Performed by: FAMILY MEDICINE

## 2020-03-02 PROCEDURE — 99214 OFFICE O/P EST MOD 30 MIN: CPT | Mod: S$GLB,,, | Performed by: FAMILY MEDICINE

## 2020-03-02 PROCEDURE — 2024F PR 7 FIELD PHOTOS WITH INTERP/ REVIEW: ICD-10-PCS | Mod: S$GLB,,, | Performed by: FAMILY MEDICINE

## 2020-03-02 PROCEDURE — 2024F 7 FLD RTA PHOTO EVC RTNOPTHY: CPT | Mod: S$GLB,,, | Performed by: FAMILY MEDICINE

## 2020-03-02 PROCEDURE — 99999 PR PBB SHADOW E&M-EST. PATIENT-LVL III: CPT | Mod: PBBFAC,,, | Performed by: FAMILY MEDICINE

## 2020-03-02 PROCEDURE — 99214 PR OFFICE/OUTPT VISIT, EST, LEVL IV, 30-39 MIN: ICD-10-PCS | Mod: S$GLB,,, | Performed by: FAMILY MEDICINE

## 2020-03-02 RX ORDER — CINACALCET 30 MG/1
30 TABLET, FILM COATED ORAL DAILY
COMMUNITY
Start: 2020-01-30 | End: 2020-08-06 | Stop reason: CLARIF

## 2020-03-02 RX ORDER — TADALAFIL 2.5 MG/1
5 TABLET ORAL DAILY PRN
Qty: 90 TABLET | Refills: 0 | Status: SHIPPED | OUTPATIENT
Start: 2020-03-02 | End: 2021-03-18

## 2020-03-03 DIAGNOSIS — E11.40 TYPE 2 DIABETES MELLITUS WITH DIABETIC NEUROPATHY, WITHOUT LONG-TERM CURRENT USE OF INSULIN: ICD-10-CM

## 2020-03-03 NOTE — PROGRESS NOTES
Subjective:       Patient ID: Nicholas Nugent is a 50 y.o. male.    Chief Complaint: Diabetes; Hypertension; Chronic Kidney Disease; and Hyperlipidemia    HPI   50 year old male with diabetes, hypertension, mixed dyslipidemia, and chronic kidney disease comes in for follow up on these. He reports that he feels fine. He is not checking his sugars at home. Patient reports concern with erection problems. His nephrologist had prescribed him cialis 20 mg PRN. However, this is not working for him. He still has problems attaining and maintaining erections.     Review of Systems   Constitutional: Negative for chills and fever.   Respiratory: Negative for shortness of breath and wheezing.    Cardiovascular: Negative for chest pain and palpitations.   Gastrointestinal: Negative for abdominal pain.   Genitourinary: Negative for dysuria.   Neurological: Negative for dizziness, syncope and headaches.       Objective:     /72 (BP Location: Left arm, Patient Position: Sitting, BP Method: Medium (Manual))   Pulse 96   Temp 97.9 °F (36.6 °C) (Oral)   Resp 16   Wt 92 kg (202 lb 13.2 oz)   SpO2 96%   BMI 31.77 kg/m²     Physical Exam   Constitutional: He appears well-developed. No distress.   HENT:   Head: Normocephalic and atraumatic.   Eyes: Pupils are equal, round, and reactive to light. EOM are normal.   Neck: Normal range of motion. Neck supple.   Cardiovascular: Normal rate, regular rhythm, normal heart sounds and intact distal pulses.   Pulmonary/Chest: Effort normal and breath sounds normal.   Abdominal: Soft. Bowel sounds are normal.       Assessment:       1. 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 Benign hypertension    3. CKD (chronic kidney disease) stage 3, GFR 30-59 ml/min    4. Mixed dyslipidemia    5. Primary hyperparathyroidism    6. Encounter for screening for HIV    7. Erectile dysfunction, unspecified erectile dysfunction type        Plan:       Nicholas was seen today for  diabetes, hypertension, chronic kidney disease and hyperlipidemia. Patient requests to have [facility].    Diagnoses and all orders for this visit:    Type 2 diabetes mellitus with neuropathy causing erectile dysfunction  -     Comprehensive metabolic panel; Future  -     Hemoglobin A1c; Future  -     Microalbumin/creatinine urine ratio; Future  Check diabetic labs.   Continue current regimen.    Benign hypertension  -     Comprehensive metabolic panel; Future  -     Lipid panel; Future  -     Microalbumin/creatinine urine ratio; Future  Continue current hypertension regimen.    CKD (chronic kidney disease) stage 3, GFR 30-59 ml/min  -     Comprehensive metabolic panel; Future  -     PTH, intact; Future  -     Vitamin D; Future  -     Uric acid; Future  -     Calcium, ionized; Future  -     Microalbumin/creatinine urine ratio; Future  Check CKD labs.    Mixed dyslipidemia  -     Comprehensive metabolic panel; Future  -     Lipid panel; Future  Patient no longer of Vasepa.  Continue Repatha, Lipitor, Niacin.    Primary hyperparathyroidism  -     PTH, intact; Future  -     Vitamin D; Future  Check PTH    Encounter for screening for HIV  -     HIV 1/2 Ag/Ab (4th Gen); Future  Screen for HIV as per USPSTF guidelines.    Erectile dysfunction, unspecified erectile dysfunction type  -     tadalafil (CIALIS) 2.5 mg Tab; Take 5 mg by mouth daily as needed for Erectile Dysfunction.  Discussed using daily Cialis, and this was renally dosed.

## 2020-03-04 LAB
25(OH)D3 SERPL-MCNC: 24 NG/ML (ref 30–100)
ALBUMIN SERPL-MCNC: 4.3 G/DL (ref 3.6–5.1)
ALBUMIN/CREAT UR: 52 MCG/MG CREAT
ALBUMIN/GLOB SERPL: 1.2 (CALC) (ref 1–2.5)
ALP SERPL-CCNC: 85 U/L (ref 35–144)
ALT SERPL-CCNC: 29 U/L (ref 9–46)
AST SERPL-CCNC: 25 U/L (ref 10–35)
BILIRUB SERPL-MCNC: 0.6 MG/DL (ref 0.2–1.2)
BUN SERPL-MCNC: 26 MG/DL (ref 7–25)
BUN/CREAT SERPL: 15 (CALC) (ref 6–22)
CA-I SERPL-MCNC: 5.2 MG/DL (ref 4.8–5.6)
CALCIUM SERPL-MCNC: 9.9 MG/DL (ref 8.6–10.3)
CHLORIDE SERPL-SCNC: 107 MMOL/L (ref 98–110)
CHOLEST SERPL-MCNC: 85 MG/DL
CHOLEST/HDLC SERPL: 3.3 (CALC)
CO2 SERPL-SCNC: 27 MMOL/L (ref 20–32)
CREAT SERPL-MCNC: 1.77 MG/DL (ref 0.7–1.33)
CREAT UR-MCNC: 140 MG/DL (ref 20–320)
GFRSERPLBLD MDRD-ARVRAT: 43 ML/MIN/1.73M2
GLOBULIN SER CALC-MCNC: 3.5 G/DL (CALC) (ref 1.9–3.7)
GLUCOSE SERPL-MCNC: 157 MG/DL (ref 65–99)
HBA1C MFR BLD: 6.4 % OF TOTAL HGB
HDLC SERPL-MCNC: 26 MG/DL
HIV 1+2 AB+HIV1 P24 AG SERPL QL IA: NORMAL
LDLC SERPL CALC-MCNC: NORMAL MG/DL (CALC)
MICROALBUMIN UR-MCNC: 7.3 MG/DL
NONHDLC SERPL-MCNC: 59 MG/DL (CALC)
POTASSIUM SERPL-SCNC: 4.1 MMOL/L (ref 3.5–5.3)
PROT SERPL-MCNC: 7.8 G/DL (ref 6.1–8.1)
PTH-INTACT SERPL-MCNC: 49 PG/ML (ref 14–64)
SODIUM SERPL-SCNC: 142 MMOL/L (ref 135–146)
TRIGL SERPL-MCNC: 408 MG/DL
URATE SERPL-MCNC: 8.8 MG/DL (ref 4–8)

## 2020-03-25 ENCOUNTER — TELEPHONE (OUTPATIENT)
Dept: FAMILY MEDICINE | Facility: CLINIC | Age: 50
End: 2020-03-25

## 2020-03-25 NOTE — TELEPHONE ENCOUNTER
----- Message from Barrett Garcia MA sent at 3/25/2020  3:58 PM CDT -----  Contact: Wife Esther [phone]  Pt requesting med refill   ----- Message -----  From: Jillian Hua  Sent: 3/25/2020   3:48 PM CDT  To: Honorio Purdy Staff    Type: Patient Call Back    Who called: Wife Esther    What is the request in detail: Would like to know if Dr Olmedo can call in medication to pharmacy for a possible sinus infection. States patient has sinus pressure and a little chill, per wife. Please call pt in regards to this.   .  [id number] [place] - [address]  [address]  [place] [address]  Phone: [phone] Fax: [phone]    Would the patient rather a call back or a response via [facility]? Call back    Best call back number: [phone]

## 2020-03-25 NOTE — TELEPHONE ENCOUNTER
I called and left a message for the pt to call the office to schedule an appointment for a virtual visit.

## 2020-03-25 NOTE — TELEPHONE ENCOUNTER
----- Message from Jillian Hua sent at 3/25/2020  3:48 PM CDT -----  Contact: Lilli Santana [phone]  Type: Patient Call Back    Who called: Lilli Santana    What is the request in detail: Would like to know if Dr Olmedo can call in medication to pharmacy for a possible sinus infection. States patient has sinus pressure and a little chill, per wife. Please call pt in regards to this.   .  [id number] [place] - [address]  [address]  [place] [address]  Phone: [phone] Fax: [phone]    Would the patient rather a call back or a response via [facility]? Call back    Best call back number: [phone]

## 2020-03-26 ENCOUNTER — OFFICE VISIT (OUTPATIENT)
Dept: FAMILY MEDICINE | Facility: CLINIC | Age: 50
End: 2020-03-26
Payer: COMMERCIAL

## 2020-03-26 VITALS
RESPIRATION RATE: 19 BRPM | DIASTOLIC BLOOD PRESSURE: 77 MMHG | OXYGEN SATURATION: 99 % | HEART RATE: 92 BPM | SYSTOLIC BLOOD PRESSURE: 133 MMHG | TEMPERATURE: 98 F

## 2020-03-26 DIAGNOSIS — N52.1 TYPE 2 DIABETES MELLITUS WITH NEUROPATHY CAUSING ERECTILE DYSFUNCTION: ICD-10-CM

## 2020-03-26 DIAGNOSIS — J01.90 ACUTE BACTERIAL SINUSITIS: Primary | ICD-10-CM

## 2020-03-26 DIAGNOSIS — H72.91 PERFORATED EARDRUM, RIGHT: ICD-10-CM

## 2020-03-26 DIAGNOSIS — I10 BENIGN HYPERTENSION: ICD-10-CM

## 2020-03-26 DIAGNOSIS — B96.89 ACUTE BACTERIAL SINUSITIS: Primary | ICD-10-CM

## 2020-03-26 DIAGNOSIS — E11.40 TYPE 2 DIABETES MELLITUS WITH NEUROPATHY CAUSING ERECTILE DYSFUNCTION: ICD-10-CM

## 2020-03-26 DIAGNOSIS — N18.30 CKD (CHRONIC KIDNEY DISEASE) STAGE 3, GFR 30-59 ML/MIN: ICD-10-CM

## 2020-03-26 PROCEDURE — 99214 PR OFFICE/OUTPT VISIT, EST, LEVL IV, 30-39 MIN: ICD-10-PCS | Mod: S$GLB,,, | Performed by: FAMILY MEDICINE

## 2020-03-26 PROCEDURE — 99214 OFFICE O/P EST MOD 30 MIN: CPT | Mod: S$GLB,,, | Performed by: FAMILY MEDICINE

## 2020-03-26 PROCEDURE — 99999 PR PBB SHADOW E&M-EST. PATIENT-LVL III: ICD-10-PCS | Mod: PBBFAC,,, | Performed by: FAMILY MEDICINE

## 2020-03-26 PROCEDURE — 2024F 7 FLD RTA PHOTO EVC RTNOPTHY: CPT | Mod: S$GLB,,, | Performed by: FAMILY MEDICINE

## 2020-03-26 PROCEDURE — 2024F PR 7 FIELD PHOTOS WITH INTERP/ REVIEW: ICD-10-PCS | Mod: S$GLB,,, | Performed by: FAMILY MEDICINE

## 2020-03-26 PROCEDURE — 99999 PR PBB SHADOW E&M-EST. PATIENT-LVL III: CPT | Mod: PBBFAC,,, | Performed by: FAMILY MEDICINE

## 2020-03-26 RX ORDER — AMOXICILLIN AND CLAVULANATE POTASSIUM 500; 125 MG/1; MG/1
1 TABLET, FILM COATED ORAL 2 TIMES DAILY
Qty: 20 TABLET | Refills: 0 | Status: SHIPPED | OUTPATIENT
Start: 2020-03-26 | End: 2020-04-05

## 2020-03-26 RX ORDER — IPRATROPIUM BROMIDE 42 UG/1
2 SPRAY, METERED NASAL 4 TIMES DAILY
Qty: 15 ML | Refills: 0 | Status: SHIPPED | OUTPATIENT
Start: 2020-03-26 | End: 2020-08-06 | Stop reason: CLARIF

## 2020-03-26 NOTE — PROGRESS NOTES
Subjective:       Patient ID: Nicholas Nugent is a 50 y.o. male.    Chief Complaint: Sinus Problem    Sinus Problem   This is a new problem. Episode onset: 3 weeks. The problem has been waxing and waning since onset. The maximum temperature recorded prior to his arrival was 101 - 101.9 F. The fever has been present for 3 to 4 days. Associated symptoms include chills, congestion, coughing, diaphoresis, headaches, sinus pressure and sneezing. Pertinent negatives include no ear pain, neck pain, shortness of breath, sore throat or swollen glands. Past treatments include nothing.      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 He was supposed to have it operated years ago but had lost insurance.    He is taking all his other medications as prescribed.'    Review of Systems   Constitutional: Positive for chills and diaphoresis.   HENT: Positive for congestion, sinus pressure and sneezing. Negative for ear pain and sore throat.    Respiratory: Positive for cough. Negative for shortness of breath.    Musculoskeletal: Negative for neck pain.   Neurological: Positive for headaches.       Objective:     /77 (BP Location: Left arm, Patient Position: Sitting, BP Method: Large (Automatic))   Pulse 92   Temp 97.8 °F (36.6 °C) (Tympanic)   Resp 19   SpO2 99%     Physical Exam   Constitutional: He is cooperative.  Non-toxic appearance. He appears ill. No distress.   HENT:   Head: Normocephalic and atraumatic.   Right Ear: External ear normal.   Left Ear: External ear normal.   Nose: Mucosal edema and rhinorrhea present. No nasal septal hematoma. No epistaxis.  No foreign bodies. Right sinus exhibits maxillary sinus tenderness. Left sinus exhibits maxillary sinus tenderness.   Mouth/Throat: Uvula is midline. Posterior oropharyngeal erythema present. No oropharyngeal exudate.   Eyes: Pupils are equal, round, and reactive to light. Lids are normal. Right conjunctiva is injected. Left  conjunctiva is injected.   Neck: Trachea normal and normal range of motion. Neck supple.   Cardiovascular: Normal rate, regular rhythm, S1 normal, S2 normal and intact distal pulses.   Pulmonary/Chest: Effort normal and breath sounds normal. No respiratory distress. He has no wheezes. He has no rhonchi. He has no rales.   Abdominal: Soft. Bowel sounds are normal. He exhibits no distension. There is no tenderness.   Neurological: He is alert.       Assessment:       1. Acute bacterial sinusitis    2. Perforated eardrum, right    3. Type 2 diabetes mellitus with neuropathy causing erectile dysfunction    4. Benign hypertension    5. CKD (chronic kidney disease) stage 3, GFR 30-59 ml/min        Plan:       Nicholas was seen today for sinus problem.    Diagnoses and all orders for this visit:    Acute bacterial sinusitis  -     amoxicillin-clavulanate 500-125mg (AUGMENTIN) 500-125 mg Tab; Take 1 tablet (500 mg total) by mouth 2 (two) times daily. for 10 days  -     ipratropium (ATROVENT) 42 mcg (0.06 %) nasal spray; 2 sprays by Nasal route 4 (four) times daily.  Advised patient to use an over the counter nasal saline every 1-2 hours.  Patient instructed on how to use the nasal saline.   Use prescribed nasal spray as instructed.  Given severity of symptoms will start on antibiotic regimen.  Advised using a probiotic or eating a yogurt every day to minimize GI side effects of antibiotics.    Perforated eardrum, right  -     Ambulatory referral/consult to ENT; Future  Referral to ent    Type 2 diabetes mellitus with neuropathy causing erectile dysfunction  Continue current regimen    Benign hypertension  Continue current regimen    CKD (chronic kidney disease) stage 3, GFR 30-59 ml/min  Management by nephro

## 2020-03-27 ENCOUNTER — PATIENT MESSAGE (OUTPATIENT)
Dept: OTOLARYNGOLOGY | Facility: CLINIC | Age: 50
End: 2020-03-27

## 2020-03-29 ENCOUNTER — NURSE TRIAGE (OUTPATIENT)
Dept: ADMINISTRATIVE | Facility: CLINIC | Age: 50
End: 2020-03-29

## 2020-03-30 NOTE — TELEPHONE ENCOUNTER
Esther mcelroy, states pt has been on abx for 3 days now with no improvement, reports last temp was 102.8, pt has hx DM II. Advised patient per protocol. Caller verbalized understanding.      Reason for Disposition   [1] Fever > 100.0 F (37.8 C) AND [2] diabetes mellitus or weak immune system (e.g., HIV positive, cancer chemo, splenectomy, chronic steroids)    Additional Information   Negative: Shock suspected (e.g., cold/pale/clammy skin, too weak to stand, low BP, rapid pulse)   Negative: Difficult to awaken or acting confused (e.g., disoriented, slurred speech)   Negative: [1] Difficulty breathing AND [2] bluish lips, tongue or face   Negative: New onset rash with multiple purple (or blood-colored) spots or dots   Negative: Sounds like a life-threatening emergency to the triager   Negative: [1] Headache AND [2] stiff neck (can't touch chin to chest)   Negative: Difficulty breathing   Negative: IV drug abuse   Negative: [1] Drinking very little AND [2] dehydration suspected (e.g., no urine > 12 hours, very dry mouth, very lightheaded)   Negative: Patient sounds very sick or weak to the triager  (Exception: mild weakness and hasn't taken fever medicine)   Negative: Fever > 104 F (40 C)   Negative: [1] Fever > 101 F (38.3 C) AND [2] age > 60   Negative: [1] Fever > 100.0 F (37.8 C) AND [2] bedridden (e.g., nursing home patient, CVA, chronic illness, recovering from surgery)   Negative: [1] Fever > 100.0 F (37.8 C) AND [2] indwelling urinary catheter (e.g., Carvalho, Coude)   Negative: [1] Fever > 100.0 F (37.8 C) AND [2] has port (portacath), central line, or PICC line    Protocols used: FEVER-A-AH

## 2020-03-31 ENCOUNTER — TELEPHONE (OUTPATIENT)
Dept: FAMILY MEDICINE | Facility: CLINIC | Age: 50
End: 2020-03-31

## 2020-03-31 ENCOUNTER — OFFICE VISIT (OUTPATIENT)
Dept: FAMILY MEDICINE | Facility: CLINIC | Age: 50
End: 2020-03-31
Payer: COMMERCIAL

## 2020-03-31 DIAGNOSIS — B96.89 ACUTE BACTERIAL SINUSITIS: Primary | ICD-10-CM

## 2020-03-31 DIAGNOSIS — J01.90 ACUTE BACTERIAL SINUSITIS: Primary | ICD-10-CM

## 2020-03-31 PROCEDURE — 99213 OFFICE O/P EST LOW 20 MIN: CPT | Mod: 95,,, | Performed by: FAMILY MEDICINE

## 2020-03-31 PROCEDURE — 99213 PR OFFICE/OUTPT VISIT, EST, LEVL III, 20-29 MIN: ICD-10-PCS | Mod: 95,,, | Performed by: FAMILY MEDICINE

## 2020-03-31 RX ORDER — DOXYCYCLINE 100 MG/1
100 CAPSULE ORAL 2 TIMES DAILY
Qty: 20 CAPSULE | Refills: 0 | Status: SHIPPED | OUTPATIENT
Start: 2020-03-31 | End: 2020-04-10

## 2020-03-31 NOTE — TELEPHONE ENCOUNTER
----- Message from Daniel Thompson sent at 3/31/2020  8:10 AM CDT -----  Contact: Esther (spouse)  Name of Who is Calling: Esther (spouse)      What is the request in detail: Would like to speak with staff in regards to patient temperature being 103 last night. Please advise      Can the clinic reply by [facility]: no      What Number to Call Back if not in [facility]: [phone]

## 2020-03-31 NOTE — TELEPHONE ENCOUNTER
"Regarding Nicholas Nugent:    Have they had fever of 100.4F or higher?  No    Does the patient report symptoms of a respiratory infection (select all that apply).  generalized muscle pains and chills and dizzy    Does the patient meet any criteria for a "high risk" population for myrtle COVID-19 (select all that apply)? none     Temperature 97.4 this morning , pt will to complete a virtual visit to discuss and knows not to come in clinic. Dr. Olmedo will contact pt at 10 am   "

## 2020-04-02 ENCOUNTER — OFFICE VISIT (OUTPATIENT)
Dept: FAMILY MEDICINE | Facility: CLINIC | Age: 50
End: 2020-04-02
Payer: COMMERCIAL

## 2020-04-02 DIAGNOSIS — J06.9 UPPER RESPIRATORY TRACT INFECTION, UNSPECIFIED TYPE: Primary | ICD-10-CM

## 2020-04-02 PROCEDURE — 99213 OFFICE O/P EST LOW 20 MIN: CPT | Mod: 95,,, | Performed by: FAMILY MEDICINE

## 2020-04-02 PROCEDURE — 99213 PR OFFICE/OUTPT VISIT, EST, LEVL III, 20-29 MIN: ICD-10-PCS | Mod: 95,,, | Performed by: FAMILY MEDICINE

## 2020-04-02 RX ORDER — CODEINE PHOSPHATE AND GUAIFENESIN 10; 100 MG/5ML; MG/5ML
10 SOLUTION ORAL EVERY 6 HOURS PRN
Qty: 236 ML | Refills: 1 | Status: SHIPPED | OUTPATIENT
Start: 2020-04-02 | End: 2020-04-12

## 2020-04-02 RX ORDER — ALBUTEROL SULFATE 90 UG/1
2 AEROSOL, METERED RESPIRATORY (INHALATION) EVERY 4 HOURS PRN
Qty: 18 G | Refills: 1 | Status: SHIPPED | OUTPATIENT
Start: 2020-04-02 | End: 2020-08-06 | Stop reason: CLARIF

## 2020-04-04 ENCOUNTER — NURSE TRIAGE (OUTPATIENT)
Dept: ADMINISTRATIVE | Facility: CLINIC | Age: 50
End: 2020-04-04

## 2020-04-04 NOTE — TELEPHONE ENCOUNTER
Pt wife called for patient acting as . Questions about ways to minimize coughing spells. Pt has RX for guaifensen for cough but feels it makes cough worse. No SOB, CP, or fever.     Reason for Disposition   1] COVID-19 infection diagnosed or suspected AND [2] mild symptoms (fever, cough) AND [2] no trouble breathing or other complications    Additional Information   Negative: SEVERE difficulty breathing (e.g., struggling for each breath, speaks in single words)   Negative: Difficult to awaken or acting confused (e.g., disoriented, slurred speech)   Negative: Bluish (or gray) lips or face now   Negative: Shock suspected (e.g., cold/pale/clammy skin, too weak to stand, low BP, rapid pulse)   Negative: Sounds like a life-threatening emergency to the triager   Negative: [1] COVID-19 suspected (e.g., cough, fever, shortness of breath) AND [2] public health department recommends testing   Negative: [1] COVID-19 exposure AND [2] no symptoms   Negative: COVID-19 and Breastfeeding, questions about   Negative: SEVERE or constant chest pain (Exception: mild central chest pain, present only when coughing)   Negative: MODERATE difficulty breathing (e.g., speaks in phrases, SOB even at rest, pulse 100-120)   Negative: Patient sounds very sick or weak to the triager   Negative: MILD difficulty breathing (e.g., minimal/no SOB at rest, SOB with walking, pulse <100)   Negative: Chest pain   Negative: Fever > 103 F (39.4 C)   Negative: [1] Fever > 101 F (38.3 C) AND [2] age > 60   Negative: [1] Fever > 100.0 F (37.8 C) AND [2] bedridden (e.g., nursing home patient, CVA, chronic illness, recovering from surgery)   Negative: HIGH RISK patient (e.g., age > 64 years, diabetes, heart or lung disease, weak immune system)   Negative: Fever present > 3 days (72 hours)   Negative: [1] Fever returns after gone for over 24 hours AND [2] symptoms worse or not improved   Negative: [1] Continuous (nonstop) coughing  interferes with work or school AND [2] no improvement using cough treatment per protocol   Negative: Cough present > 3 weeks    Protocols used: CORONAVIRUS (COVID-19) DIAGNOSED OR [id number]

## 2020-04-06 ENCOUNTER — NURSE TRIAGE (OUTPATIENT)
Dept: ADMINISTRATIVE | Facility: CLINIC | Age: 50
End: 2020-04-06

## 2020-04-06 NOTE — TELEPHONE ENCOUNTER
Patient contacted after answering YES to COVID-19 Home Symptom Monitoring Program Text Message.    Patient was not tested for COVID-19 as he did not fully meet criteria. Was evaluated on 3/31/20 via virtual visit and treated for acute bacterial sinusitis.      Patient reports he had a persistent cough earlier, but it has gotten much better. Used rx cough syrup but feels as though it worsens his cough. Has been drinking hot tea and soup. Suggested OTC cough suppressants if cough returns. Denies fever, dyspnea, SOB, chest pain, myalgias, HA, or N/V/D. Tolerating fluids and meals. Continues to quarantine at home.     Based on this call, patient seems stable enough to continue care at home.      Care Advice  - Stressed importance of proper handwashing and strict quarantine.   -Encouraged patient to drink plenty fluids.  -Eat as tolerated.  -Advised patient to avoid close contact with family members/friends to prevent the spread of the virus.  -Advised against leaving the home and to quarantine in a room in the house.  -Warning signs discussed and instructed to go the nearest ED if dyspnea, SOB, chest pain or weakness/confusion develops.  -Notified patient that an automated text message will be sent daily for the next 2 weeks to check on symptoms.      Reason for Disposition   Cough with no complications    Additional Information   Negative: Bluish (or gray) lips or face   Negative: Severe difficulty breathing (e.g., struggling for each breath, speaks in single words)   Negative: Rapid onset of cough and has hives   Negative: Coughing started suddenly after medicine, an allergic food or bee sting   Negative: Difficulty breathing after exposure to flames, smoke, or fumes   Negative: Sounds like a life-threatening emergency to the triager    Protocols used: COUGH-A-OH      Ekaterina Lazo NP

## 2020-04-07 ENCOUNTER — TELEPHONE (OUTPATIENT)
Dept: ADMINISTRATIVE | Facility: CLINIC | Age: 50
End: 2020-04-07

## 2020-04-07 NOTE — PROGRESS NOTES
Subjective:       Patient ID: Nicholas Nugent is a 50 y.o. male.    Chief Complaint: Fever    Fever    This is a recurrent problem. The current episode started 1 to 4 weeks ago. The problem has been waxing and waning. The maximum temperature noted was 102 to 102.9 F. The temperature was taken using an axillary reading. Associated symptoms include congestion and coughing. Pertinent negatives include no chest pain or wheezing. Treatments tried: recently had antibiotic changed from Augmentin to Doxy. The treatment provided mild relief.   Patient was recently seen for sinusitis. He had not been improving on Augmentin so it was changed to Zithromax. States he still feels very fatigued, and having cough. No shortness of breath reported. No wheezing. Has lost appetite.     Review of Systems   Constitutional: Positive for activity change, appetite change, fatigue and fever. Negative for chills and diaphoresis.   HENT: Positive for congestion.    Respiratory: Positive for cough. Negative for shortness of breath and wheezing.    Cardiovascular: Negative for chest pain and palpitations.       Objective:      Physical Exam    Assessment:       1. Upper respiratory tract infection, unspecified type        Plan:       Nicholas was seen today for fever.    Diagnoses and all orders for this visit:    Upper respiratory tract infection, unspecified type  -     COVID-19 Home Symptom Monitoring  - Duration (days): 14  -     albuterol (VENTOLIN HFA) 90 mcg/actuation inhaler; Inhale 2 puffs into the lungs every 4 (four) hours as needed for Wheezing or Shortness of Breath.  -     guaifenesin-codeine 100-10 mg/5 ml (CHERATUSSIN AC)  mg/5 mL syrup; Take 10 mLs by mouth every 6 (six) hours as needed for Cough or Congestion.    Continue antibiotic regimen.  Albuterol inhaler in case develops some shortness of breath.   At present does not meet criteria for COVID testing but did explain to him and wife present in room that does not mean he  does not have it.   COVID precautions discussed and will enroll in COVID nursing monitoring.  Tylenol for fever. No ibuprofen or other NSAIDs for now.  Cheratussin to help with cough.    The patient location is: [place]  The chief complaint leading to consultation is: Cough/Fever  Visit type: Virtual visit with synchronous audio and video  Total time spent with patient: 15  Each patient to whom he or she provides medical services by telemedicine is:  (1) informed of the relationship between the physician and patient and the respective role of any other health care provider with respect to management of the patient; and (2) notified that he or she may decline to receive medical services by telemedicine and may withdraw from such care at any time.

## 2020-04-07 NOTE — PROGRESS NOTES
Subjective:       Patient ID: Nicholas Nugent is a 50 y.o. male.    Chief Complaint: Cough and Fever    HPI   50 year old male made a virtual visit for continuation of cough and fever. His wife is in the room he is located in as well. Patient reports continued feeling of fatigue, no appetite, continued productive cough with headaches and pressure under eyes. Fever as high as 103. At present no fever. Taking Tylenol for temperature. No chest pain, shortness of breath or wheezing. Taking Augmentin as prescribed with little relief and requesting change in antibiotic.     Review of Systems   Constitutional: Positive for appetite change, fatigue and fever.   HENT: Positive for congestion, postnasal drip, sinus pressure, sinus pain and sore throat. Negative for trouble swallowing.    Respiratory: Positive for cough. Negative for chest tightness, shortness of breath and wheezing.    Cardiovascular: Negative for chest pain and leg swelling.   Hematological: Negative for adenopathy.       Objective:      Physical Exam    Assessment:       1. Acute bacterial sinusitis        Plan:       Nicholas was seen today for cough and fever.    Diagnoses and all orders for this visit:    Acute bacterial sinusitis  -     doxycycline (VIBRAMYCIN) 100 MG Cap; Take 1 capsule (100 mg total) by mouth 2 (two) times daily. for 10 days    Advise continued symptomatic care.  Advised rest and fluids.  COVID precautions discussed.   Will change antibiotic to Doxy.  Advised if any development of SOB to contact office, or go to ED if difficulty breathing.      The patient location is: [place]   The chief complaint leading to consultation is: Cough/Sinusitis  Visit type: Virtual visit with synchronous audio and video  Total time spent with patient: 10 minutes  Each patient to whom he or she provides medical services by telemedicine is:  (1) informed of the relationship between the physician and patient and the respective role of any other health care  provider with respect to management of the patient; and (2) notified that he or she may decline to receive medical services by telemedicine and may withdraw from such care at any time.

## 2020-04-07 NOTE — TELEPHONE ENCOUNTER
Returned patient's call. Wife wanted to talk to someone regarding his persistent cough. Says she just purchased an OTC antitussive and will attempt to use that to relieve symptoms. Denies dyspnea, SOB, or chest pain. Tolerating fluids. Continues to quarantine at home. Notified that he will continue to receive automated text message for the next two weeks to monitor his symptoms. Verbalized understanding of all information give. No other questions at this time.    Ekaterina Lazo, NP

## 2020-04-08 ENCOUNTER — OFFICE VISIT (OUTPATIENT)
Dept: FAMILY MEDICINE | Facility: CLINIC | Age: 50
End: 2020-04-08
Payer: COMMERCIAL

## 2020-04-08 DIAGNOSIS — N18.30 CKD (CHRONIC KIDNEY DISEASE) STAGE 3, GFR 30-59 ML/MIN: ICD-10-CM

## 2020-04-08 DIAGNOSIS — E86.0 DEHYDRATION: ICD-10-CM

## 2020-04-08 DIAGNOSIS — R05.9 COUGH: ICD-10-CM

## 2020-04-08 DIAGNOSIS — N52.1 TYPE 2 DIABETES MELLITUS WITH NEUROPATHY CAUSING ERECTILE DYSFUNCTION: ICD-10-CM

## 2020-04-08 DIAGNOSIS — I10 BENIGN HYPERTENSION: ICD-10-CM

## 2020-04-08 DIAGNOSIS — J06.9 UPPER RESPIRATORY TRACT INFECTION, UNSPECIFIED TYPE: Primary | ICD-10-CM

## 2020-04-08 DIAGNOSIS — E11.40 TYPE 2 DIABETES MELLITUS WITH NEUROPATHY CAUSING ERECTILE DYSFUNCTION: ICD-10-CM

## 2020-04-08 PROCEDURE — 2024F 7 FLD RTA PHOTO EVC RTNOPTHY: CPT | Mod: ,,, | Performed by: FAMILY MEDICINE

## 2020-04-08 PROCEDURE — 99214 OFFICE O/P EST MOD 30 MIN: CPT | Mod: 95,,, | Performed by: FAMILY MEDICINE

## 2020-04-08 PROCEDURE — 99214 PR OFFICE/OUTPT VISIT, EST, LEVL IV, 30-39 MIN: ICD-10-PCS | Mod: 95,,, | Performed by: FAMILY MEDICINE

## 2020-04-08 PROCEDURE — 2024F PR 7 FIELD PHOTOS WITH INTERP/ REVIEW: ICD-10-PCS | Mod: ,,, | Performed by: FAMILY MEDICINE

## 2020-04-09 ENCOUNTER — TELEPHONE (OUTPATIENT)
Dept: FAMILY MEDICINE | Facility: CLINIC | Age: 50
End: 2020-04-09

## 2020-04-09 RX ORDER — BENZONATATE 200 MG/1
200 CAPSULE ORAL 3 TIMES DAILY PRN
Qty: 30 CAPSULE | Refills: 1 | Status: SHIPPED | OUTPATIENT
Start: 2020-04-09 | End: 2020-04-19

## 2020-04-09 NOTE — TELEPHONE ENCOUNTER
Spoke with patient's wife on phone. Informed her that I spoke to home health and they would not be able to send some just for IV fluids given risk. Of COVID exposure.    Wife reported that it was fine as patient slept fine last night, was drinking more fluids, and feeling better. Reports no fever.

## 2020-04-09 NOTE — TELEPHONE ENCOUNTER
----- Message from Sherri Ziegler sent at 4/8/2020  6:36 PM CDT -----  PT IS REQUESTING A CALL BACK FROM THE NURSE TO SPEAK ABOUT MEDICATION THAT WAS NOT SENT O PHARMACY.    PLEASE CALL AND ADVISE        THANK YOU

## 2020-04-09 NOTE — PROGRESS NOTES
Subjective:       Patient ID: Nicholas Nugent is a 50 y.o. male.    Chief Complaint: Cough    Cough   Pertinent negatives include no chest pain, chills, fever, shortness of breath or wheezing.    50 year old male with diabetes, hypertension, CKD, and obesity makes a follow up virtual visit for continued cough. He has been recently evaluated for a respiratory tract infection. His wife is present in the room with him. They report that the patient is no longer having fevers. However, he is not drinking much fluids, has no appetite, and feels very fatigued. He continues to have cough, mostly when he changes position. There is no chest pain or shortness of breath. The wife is requesting if nurse can come out for IV fluids. They also report promethazine-codeine made him cough more.    Review of Systems   Constitutional: Positive for activity change, appetite change and fatigue. Negative for chills, diaphoresis, fever and unexpected weight change.   Respiratory: Positive for cough. Negative for shortness of breath and wheezing.    Cardiovascular: Negative for chest pain, palpitations and leg swelling.   Gastrointestinal: Negative for abdominal pain and blood in stool.   Genitourinary: Negative for dysuria.       Objective:      Physical Exam   Constitutional: He is oriented to person, place, and time. He is cooperative.  Non-toxic appearance. He appears ill. No distress.   Neurological: He is alert and oriented to person, place, and time.       Assessment:       1. Upper respiratory tract infection, unspecified type    2. Dehydration    3. Cough        Plan:       Nicholas was seen today for cough.    Diagnoses and all orders for this visit:    Upper respiratory tract infection, unspecified type  -     benzonatate (TESSALON) 200 MG capsule; Take 1 capsule (200 mg total) by mouth 3 (three) times daily as needed for Cough.  Advised continued symptomatic care with fluids, rest, will add Tessalon for cough relief.   Discussed that  changes in COVID testing guidelines now permits us to test, but they decline for COVID testing.    Dehydration  Advised oral hydration.  Discussed that we don't do IV fluids at home from staff in our office, but that I would ask home health but they likely would not either given risk of exposing others.    Cough  -     benzonatate (TESSALON) 200 MG capsule; Take 1 capsule (200 mg total) by mouth 3 (three) times daily as needed for Cough.  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    Benign hypertension  Continue current regimen.    CKD (chronic kidney disease) stage 3, GFR 30-59 ml/min  Importance of hydration explained.      The patient location is: [place]  The chief complaint leading to consultation is: Cough   Visit type: Virtual visit with synchronous audio and video  Total time spent with patient:  20 minutes  Each patient to whom he or she provides medical services by telemedicine is:  (1) informed of the relationship between the physician and patient and the respective role of any other health care provider with respect to management of the patient; and (2) notified that he or she may decline to receive medical services by telemedicine and may withdraw from such care at any time.

## 2020-04-13 ENCOUNTER — CLINICAL SUPPORT (OUTPATIENT)
Dept: FAMILY MEDICINE | Facility: CLINIC | Age: 50
End: 2020-04-13
Payer: COMMERCIAL

## 2020-04-13 DIAGNOSIS — Z20.822 SUSPECTED COVID-19 VIRUS INFECTION: Primary | ICD-10-CM

## 2020-04-13 DIAGNOSIS — Z20.822 SUSPECTED COVID-19 VIRUS INFECTION: ICD-10-CM

## 2020-04-13 PROCEDURE — U0002 COVID-19 LAB TEST NON-CDC: HCPCS

## 2020-04-14 ENCOUNTER — TELEPHONE (OUTPATIENT)
Dept: FAMILY MEDICINE | Facility: CLINIC | Age: 50
End: 2020-04-14

## 2020-04-14 LAB — SARS-COV-2 RNA RESP QL NAA+PROBE: NOT DETECTED

## 2020-04-14 NOTE — TELEPHONE ENCOUNTER
----- Message from Gurwinder Olmedo Jr., MD sent at 4/14/2020  8:24 AM CDT -----  Please let patient know that the coronavirus test was negative

## 2020-05-16 ENCOUNTER — OFFICE VISIT (OUTPATIENT)
Dept: FAMILY MEDICINE | Facility: CLINIC | Age: 50
End: 2020-05-16
Payer: COMMERCIAL

## 2020-05-16 VITALS
SYSTOLIC BLOOD PRESSURE: 111 MMHG | OXYGEN SATURATION: 99 % | DIASTOLIC BLOOD PRESSURE: 74 MMHG | BODY MASS INDEX: 28.66 KG/M2 | WEIGHT: 183 LBS | HEART RATE: 92 BPM | TEMPERATURE: 98 F | RESPIRATION RATE: 19 BRPM

## 2020-05-16 DIAGNOSIS — N52.1 TYPE 2 DIABETES MELLITUS WITH NEUROPATHY CAUSING ERECTILE DYSFUNCTION: Primary | ICD-10-CM

## 2020-05-16 DIAGNOSIS — Z01.84 IMMUNITY STATUS TESTING: ICD-10-CM

## 2020-05-16 DIAGNOSIS — J06.9 UPPER RESPIRATORY TRACT INFECTION, UNSPECIFIED TYPE: ICD-10-CM

## 2020-05-16 DIAGNOSIS — N18.30 CKD (CHRONIC KIDNEY DISEASE) STAGE 3, GFR 30-59 ML/MIN: ICD-10-CM

## 2020-05-16 DIAGNOSIS — E11.40 TYPE 2 DIABETES MELLITUS WITH NEUROPATHY CAUSING ERECTILE DYSFUNCTION: Primary | ICD-10-CM

## 2020-05-16 DIAGNOSIS — I10 BENIGN HYPERTENSION: ICD-10-CM

## 2020-05-16 DIAGNOSIS — H53.8 BLURRED VISION, BILATERAL: ICD-10-CM

## 2020-05-16 DIAGNOSIS — E86.0 DEHYDRATION: ICD-10-CM

## 2020-05-16 DIAGNOSIS — E78.2 MIXED DYSLIPIDEMIA: ICD-10-CM

## 2020-05-16 PROCEDURE — 99999 PR PBB SHADOW E&M-EST. PATIENT-LVL IV: ICD-10-PCS | Mod: PBBFAC,,, | Performed by: FAMILY MEDICINE

## 2020-05-16 PROCEDURE — 99214 PR OFFICE/OUTPT VISIT, EST, LEVL IV, 30-39 MIN: ICD-10-PCS | Mod: S$GLB,,, | Performed by: FAMILY MEDICINE

## 2020-05-16 PROCEDURE — 2024F 7 FLD RTA PHOTO EVC RTNOPTHY: CPT | Mod: S$GLB,,, | Performed by: FAMILY MEDICINE

## 2020-05-16 PROCEDURE — 99999 PR PBB SHADOW E&M-EST. PATIENT-LVL IV: CPT | Mod: PBBFAC,,, | Performed by: FAMILY MEDICINE

## 2020-05-16 PROCEDURE — 99214 OFFICE O/P EST MOD 30 MIN: CPT | Mod: S$GLB,,, | Performed by: FAMILY MEDICINE

## 2020-05-16 PROCEDURE — 2024F PR 7 FIELD PHOTOS WITH INTERP/ REVIEW: ICD-10-PCS | Mod: S$GLB,,, | Performed by: FAMILY MEDICINE

## 2020-05-16 RX ORDER — LANCETS
EACH MISCELLANEOUS
Qty: 100 EACH | Refills: 11 | Status: SHIPPED | OUTPATIENT
Start: 2020-05-16

## 2020-05-16 RX ORDER — INSULIN PUMP SYRINGE, 3 ML
EACH MISCELLANEOUS
Qty: 1 EACH | Refills: 0 | Status: SHIPPED | OUTPATIENT
Start: 2020-05-16 | End: 2020-08-06 | Stop reason: CLARIF

## 2020-05-18 ENCOUNTER — LAB VISIT (OUTPATIENT)
Dept: LAB | Facility: HOSPITAL | Age: 50
End: 2020-05-18
Attending: FAMILY MEDICINE
Payer: COMMERCIAL

## 2020-05-18 DIAGNOSIS — E86.0 DEHYDRATION: ICD-10-CM

## 2020-05-18 DIAGNOSIS — I10 BENIGN HYPERTENSION: ICD-10-CM

## 2020-05-18 DIAGNOSIS — E11.40 TYPE 2 DIABETES MELLITUS WITH NEUROPATHY CAUSING ERECTILE DYSFUNCTION: ICD-10-CM

## 2020-05-18 DIAGNOSIS — Z01.84 IMMUNITY STATUS TESTING: ICD-10-CM

## 2020-05-18 DIAGNOSIS — E78.2 MIXED DYSLIPIDEMIA: ICD-10-CM

## 2020-05-18 DIAGNOSIS — N52.1 TYPE 2 DIABETES MELLITUS WITH NEUROPATHY CAUSING ERECTILE DYSFUNCTION: ICD-10-CM

## 2020-05-18 LAB
ALBUMIN SERPL BCP-MCNC: 3.9 G/DL (ref 3.5–5.2)
ALP SERPL-CCNC: 78 U/L (ref 55–135)
ALT SERPL W/O P-5'-P-CCNC: 31 U/L (ref 10–44)
ANION GAP SERPL CALC-SCNC: 11 MMOL/L (ref 8–16)
AST SERPL-CCNC: 29 U/L (ref 10–40)
BILIRUB SERPL-MCNC: 0.6 MG/DL (ref 0.1–1)
BUN SERPL-MCNC: 24 MG/DL (ref 6–20)
CALCIUM SERPL-MCNC: 9.8 MG/DL (ref 8.7–10.5)
CHLORIDE SERPL-SCNC: 109 MMOL/L (ref 95–110)
CHOLEST SERPL-MCNC: 58 MG/DL (ref 120–199)
CHOLEST/HDLC SERPL: 2.4 {RATIO} (ref 2–5)
CO2 SERPL-SCNC: 21 MMOL/L (ref 23–29)
CREAT SERPL-MCNC: 1.9 MG/DL (ref 0.5–1.4)
EST. GFR  (AFRICAN AMERICAN): 46 ML/MIN/1.73 M^2
EST. GFR  (NON AFRICAN AMERICAN): 40 ML/MIN/1.73 M^2
GLUCOSE SERPL-MCNC: 94 MG/DL (ref 70–110)
HDLC SERPL-MCNC: 24 MG/DL (ref 40–75)
HDLC SERPL: 41.4 % (ref 20–50)
LDLC SERPL CALC-MCNC: 6.4 MG/DL (ref 63–159)
NONHDLC SERPL-MCNC: 34 MG/DL
POTASSIUM SERPL-SCNC: 4 MMOL/L (ref 3.5–5.1)
PROT SERPL-MCNC: 8.4 G/DL (ref 6–8.4)
SODIUM SERPL-SCNC: 141 MMOL/L (ref 136–145)
TRIGL SERPL-MCNC: 138 MG/DL (ref 30–150)

## 2020-05-18 PROCEDURE — 36415 COLL VENOUS BLD VENIPUNCTURE: CPT | Mod: PN

## 2020-05-18 PROCEDURE — 80053 COMPREHEN METABOLIC PANEL: CPT

## 2020-05-18 PROCEDURE — 83036 HEMOGLOBIN GLYCOSYLATED A1C: CPT

## 2020-05-18 PROCEDURE — 86769 SARS-COV-2 COVID-19 ANTIBODY: CPT

## 2020-05-18 PROCEDURE — 80061 LIPID PANEL: CPT

## 2020-05-19 LAB
ESTIMATED AVG GLUCOSE: 117 MG/DL (ref 68–131)
HBA1C MFR BLD HPLC: 5.7 % (ref 4–5.6)
SARS-COV-2 IGG SERPLBLD QL IA.RAPID: POSITIVE

## 2020-05-26 ENCOUNTER — CLINICAL SUPPORT (OUTPATIENT)
Dept: AUDIOLOGY | Facility: CLINIC | Age: 50
End: 2020-05-26
Payer: COMMERCIAL

## 2020-05-26 DIAGNOSIS — H90.A31 MIXED CONDUCTIVE AND SENSORINEURAL HEARING LOSS OF RIGHT EAR WITH RESTRICTED HEARING OF LEFT EAR: Primary | ICD-10-CM

## 2020-05-26 PROCEDURE — 92557 PR COMPREHENSIVE HEARING TEST: ICD-10-PCS | Mod: S$GLB,,, | Performed by: AUDIOLOGIST

## 2020-05-26 PROCEDURE — 92550 TYMPANOMETRY & REFLEX THRESH: CPT | Mod: S$GLB,,, | Performed by: AUDIOLOGIST

## 2020-05-26 PROCEDURE — 92550 PR TYMPANOMETRY AND REFLEX THRESHOLD MEASUREMENTS: ICD-10-PCS | Mod: S$GLB,,, | Performed by: AUDIOLOGIST

## 2020-05-26 PROCEDURE — 92557 COMPREHENSIVE HEARING TEST: CPT | Mod: S$GLB,,, | Performed by: AUDIOLOGIST

## 2020-05-26 NOTE — PROGRESS NOTES
Click on link to view Audiogram  Document on 5/26/2020  9:42 AM by Radha Lara MA CCC-A: Audiogram    Nicholas Nugent was seen today for an Audiologic evaluation for hearing loss and a history of a perforated tympanic membrane in the right ear.    Tympanometry revealed a Type B tympanogram with a 2.0 ear canal volume in the right ear and a Type A tympanogram with a 2.1 ear canal volume for the left ear.  Audiogram results revealed a mild to moderate sensorineural hearing loss for the left ear and a severe mixed hearing loss for the right ear.  Speech Audiometry revealed a speech reception threshold at 25dBHL with a word recognition score of 76% for the left ear and a speech reception threshold at 85dBHL and no word recognition score could be obtained due to the high intensity level for the right ear.    Recommendations:  1. Otologic evaluation  2. Follow up Audiogram as needed

## 2020-05-31 NOTE — PROGRESS NOTES
Subjective:       Patient ID: Nicholas Nugent is a 50 y.o. male.    Chief Complaint: COVID-19 Concerns    HPI   50 year old with diabetes, hypertension, dyslipidemia, CKD 3, and recent viral respiratory illness consistent with COVID-19 resulting in dehydration, comes in for routine follow up on the recent illness. He states that he is feeling much better. He would like to get labs done to make sure that his kidneys are fine and to also get his routine labs. He is also requesting COVID-19 immunity testing. He states that his energy is about 90 % back and that he is eating and sleeping normal.     Review of Systems   Constitutional: Negative for chills, diaphoresis and fever.   Eyes: Positive for visual disturbance (several months).   Respiratory: Negative for shortness of breath and wheezing.    Cardiovascular: Negative for chest pain, palpitations and leg swelling.   Gastrointestinal: Negative for abdominal pain.       Objective:      Physical Exam   Constitutional: He is oriented to person, place, and time. He appears well-developed and well-nourished.   HENT:   Head: Normocephalic.   Right Ear: External ear normal.   Left Ear: External ear normal.   Nose: Nose normal.   Mouth/Throat: No oropharyngeal exudate.   Neck: Normal range of motion. Neck supple. No tracheal deviation present.   Cardiovascular: Normal rate, regular rhythm, normal heart sounds and intact distal pulses.   No murmur heard.  Pulmonary/Chest: Effort normal and breath sounds normal. He has no wheezes. He has no rales.   Abdominal: Soft. Bowel sounds are normal. He exhibits no mass. There is no tenderness. There is no rigidity, no rebound, no guarding and no CVA tenderness.   Musculoskeletal: He exhibits no edema.   Lymphadenopathy:     He has no cervical adenopathy.   Neurological: He is oriented to person, place, and time. He has normal strength. He displays no atrophy. No sensory deficit. Gait normal.   Reflex Scores:       Patellar reflexes are  2+ on the right side and 2+ on the left side.  Skin: He is not diaphoretic.   Vitals reviewed.      Assessment:       1. 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 Benign hypertension    3. Blurred vision, bilateral    4. Upper respiratory tract infection, unspecified type    5. Dehydration    6. Immunity status testing    7. Mixed dyslipidemia    8. CKD (chronic kidney disease) stage 3, GFR 30-59 ml/min        Plan:       Nicholas was seen today for covid-19 concerns.    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    Blurred vision, bilateral  -     Ambulatory referral/consult to Optometry; Future  Will send for optometry exam    Upper respiratory tract infection, unspecified type  Patient improved.  Will schedule COVID-19 IgG testing    Dehydration  -     Comprehensive metabolic panel; Future  Check renal panel    Immunity status testing  -     COVID-19 (SARS CoV-2) IgG Antibody; Future    Mixed dyslipidemia  -     Lipid Panel; Future    CKD (chronic kidney disease) stage 3, GFR 30-59 ml/min  Management by nephrologist

## 2020-06-03 ENCOUNTER — TELEPHONE (OUTPATIENT)
Dept: OPTOMETRY | Facility: CLINIC | Age: 50
End: 2020-06-03

## 2020-07-09 ENCOUNTER — OFFICE VISIT (OUTPATIENT)
Dept: OTOLARYNGOLOGY | Facility: CLINIC | Age: 50
End: 2020-07-09
Payer: COMMERCIAL

## 2020-07-09 VITALS — WEIGHT: 182 LBS | BODY MASS INDEX: 28.56 KG/M2 | TEMPERATURE: 98 F | HEIGHT: 67 IN

## 2020-07-09 DIAGNOSIS — H72.91 PERFORATED EARDRUM, RIGHT: ICD-10-CM

## 2020-07-09 DIAGNOSIS — H66.11 CHRONIC TUBOTYMPANIC SUPPURATIVE OTITIS MEDIA, RIGHT EAR: ICD-10-CM

## 2020-07-09 DIAGNOSIS — H90.A31 MIXED CONDUCTIVE AND SENSORINEURAL HEARING LOSS OF RIGHT EAR WITH RESTRICTED HEARING OF LEFT EAR: Primary | ICD-10-CM

## 2020-07-09 PROCEDURE — 99204 PR OFFICE/OUTPT VISIT, NEW, LEVL IV, 45-59 MIN: ICD-10-PCS | Mod: 57,S$GLB,, | Performed by: OTOLARYNGOLOGY

## 2020-07-09 PROCEDURE — 99204 OFFICE O/P NEW MOD 45 MIN: CPT | Mod: 57,S$GLB,, | Performed by: OTOLARYNGOLOGY

## 2020-07-09 NOTE — LETTER
July 11, 2020      Gurwinder Olmedo Jr., MD  [address] [place] [address]           [facility]  [address]  [facility] [place] [address]  Phone: [phone]          Patient: Nicholas Nugent   MR Number: [id number]   YOB: 1970   Date of Visit: 7/9/2020       Dear Dr. Gurwinder Olmedo Jr.:    Thank you for referring Nicholas Nugent to me for evaluation. Attached you will find relevant portions of my assessment and plan of care.    If you have questions, please do not hesitate to call me. I look forward to following Nicholas Nugent along with you.    Sincerely,    Jean-Paul Lucero MD    Enclosure  CC:  No Recipients    If you would like to receive this communication electronically, please contact [email or web address] or [phone] to request more information on [facility] Link access.    For providers and/or their staff who would like to refer a patient to [facility], please contact us through our one-stop-shop provider referral line, [facility], at [phone].    If you feel you have received this communication in error or would no longer like to receive these types of communications, please e-mail [email or web address]

## 2020-07-10 NOTE — PROGRESS NOTES
Subjective:       Patient ID: Nicholas Nugent is a 50 y.o. male.    Chief Complaint: Other (perf in right ear since 16, was going to have surgery years ago but was canceled due to high blood pressure)    HPI      Nicholas Nugent is a 50 y.o. male presents for right ear tympanic membrane perforation.  He notes associated hearing loss.  The perforation has been presents for decades.  He mala otorrhea.  He has had surgery twice before without success    Review of Systems   Constitutional: Negative for chills, fever and unexpected weight change.   HENT: Negative for sore throat and trouble swallowing.    Eyes: Negative for pain and visual disturbance.   Respiratory: Negative for apnea, chest tightness and shortness of breath.    Cardiovascular: Negative for chest pain and palpitations.   Gastrointestinal: Negative for abdominal pain and nausea.   Endocrine: Negative for cold intolerance and heat intolerance.   Musculoskeletal: Negative for joint swelling and neck stiffness.   Integumentary:  Negative for color change and rash.   Neurological: Negative for facial asymmetry and headaches.   Hematological: Negative for adenopathy. Does not bruise/bleed easily.   Psychiatric/Behavioral: Negative for agitation. The patient is not nervous/anxious.          Objective:      Physical Exam  Constitutional:       Appearance: He is well-developed.   HENT:      Head: Normocephalic and atraumatic.      Right Ear: Ear canal and external ear normal. Decreased hearing noted. Tympanic membrane is scarred and perforated.      Left Ear: Tympanic membrane, ear canal and external ear normal.      Ears:        Nose: Nose normal.      Mouth/Throat:      Pharynx: Uvula midline.   Neck:      Musculoskeletal: Normal range of motion.      Thyroid: No thyroid mass.       Data:    Tympanometry revealed a Type B tympanogram with a 2.0 ear canal volume in the right ear and a Type A tympanogram with a 2.1 ear canal volume for the left ear.  Audiogram  results revealed a mild to moderate sensorineural hearing loss for the left ear and a severe mixed hearing loss for the right ear.  Speech Audiometry revealed a speech reception threshold at 25dBHL with a word recognition score of 76% for the left ear and a speech reception threshold at 85dBHL and no word recognition score could be obtained due to the high intensity level for the right     Assessment:       1. Mixed conductive and sensorineural hearing loss of right ear with restricted hearing of left ear    2. Perforated eardrum, right    3. Chronic tubotympanic suppurative otitis media, right ear        Plan:     In summary this is a pleasant 50 y.o. with chronic right TM perf who has multiple failed surgeries.  He continue to desire closure of his TM so he can swim.  I discussed this is possible but may not fully improve his conductive hearing loss.     Discussed Right Tympanoplasty with patient.Discussed possibility of long term tube placement, need for cavity, meatoplasty, cavity care, long term follow up, need for secondary procedures. Risks, complications, alternatives and goals reviewed including possible infection, bleeding, hearing loss, chronic drainage, facial nerve problems, dural injury and other potential problems.        Ok for surgery 8/13/20

## 2020-07-14 ENCOUNTER — OFFICE VISIT (OUTPATIENT)
Dept: OPTOMETRY | Facility: CLINIC | Age: 50
End: 2020-07-14
Payer: COMMERCIAL

## 2020-07-14 DIAGNOSIS — E11.9 DIABETES MELLITUS TYPE 2 WITHOUT RETINOPATHY: Primary | ICD-10-CM

## 2020-07-14 DIAGNOSIS — H52.03 HYPEROPIA WITH PRESBYOPIA OF BOTH EYES: ICD-10-CM

## 2020-07-14 DIAGNOSIS — H52.4 HYPEROPIA WITH PRESBYOPIA OF BOTH EYES: ICD-10-CM

## 2020-07-14 LAB
LEFT EYE DM RETINOPATHY: NEGATIVE
RIGHT EYE DM RETINOPATHY: NEGATIVE

## 2020-07-14 PROCEDURE — 99999 PR PBB SHADOW E&M-EST. PATIENT-LVL III: CPT | Mod: PBBFAC,,, | Performed by: OPTOMETRIST

## 2020-07-14 PROCEDURE — 92004 COMPRE OPH EXAM NEW PT 1/>: CPT | Mod: S$GLB,,, | Performed by: OPTOMETRIST

## 2020-07-14 PROCEDURE — 99999 PR PBB SHADOW E&M-EST. PATIENT-LVL III: ICD-10-PCS | Mod: PBBFAC,,, | Performed by: OPTOMETRIST

## 2020-07-14 PROCEDURE — 92004 PR EYE EXAM, NEW PATIENT,COMPREHESV: ICD-10-PCS | Mod: S$GLB,,, | Performed by: OPTOMETRIST

## 2020-07-14 PROCEDURE — 92015 DETERMINE REFRACTIVE STATE: CPT | Mod: S$GLB,,, | Performed by: OPTOMETRIST

## 2020-07-14 PROCEDURE — 92015 PR REFRACTION: ICD-10-PCS | Mod: S$GLB,,, | Performed by: OPTOMETRIST

## 2020-07-14 NOTE — PROGRESS NOTES
HPI     Annual diabetic eye exam, 1st at [facility]   Blurry vision near  COVID 2 mo ago. Feels vision changed because of it    Hemoglobin A1C       Date                     Value               Ref Range             Status                05/18/2020               5.7 (H)             4.0 - 5.6 %           Final                  03/03/2020               6.4 (H)             <5.7 % of tota*       Final                     11/27/2019               6.3 (H)             4.0 - 5.6 %           Final                Last edited by Collin Townsend, OD on 7/14/2020  3:15 PM. (History)            Assessment /Plan     For exam results, see Encounter Report.    Diabetes mellitus type 2 without retinopathy  -No retinopathy noted today.  Continued control with primary care physician and annual comprehensive eye exam.    Hyperopia with presbyopia of both eyes  Eyeglass Final Rx     Eyeglass Final Rx       Sphere Cylinder Axis Dist VA Add    Right +0.25 +0.50 015 20/20 +1.50    Left +0.50 Sphere  20/20 +1.50    Type: PAL    Expiration Date: 7/15/2021                  RTC 1 yr

## 2020-07-21 ENCOUNTER — PATIENT OUTREACH (OUTPATIENT)
Dept: ADMINISTRATIVE | Facility: OTHER | Age: 50
End: 2020-07-21

## 2020-08-06 ENCOUNTER — TELEPHONE (OUTPATIENT)
Dept: OTOLARYNGOLOGY | Facility: CLINIC | Age: 50
End: 2020-08-06

## 2020-08-06 ENCOUNTER — ANESTHESIA EVENT (OUTPATIENT)
Dept: SURGERY | Facility: HOSPITAL | Age: 50
End: 2020-08-06
Payer: COMMERCIAL

## 2020-08-06 ENCOUNTER — HOSPITAL ENCOUNTER (OUTPATIENT)
Dept: PREADMISSION TESTING | Facility: HOSPITAL | Age: 50
Discharge: HOME | End: 2020-08-06
Attending: OTOLARYNGOLOGY
Payer: COMMERCIAL

## 2020-08-06 VITALS
TEMPERATURE: 98 F | RESPIRATION RATE: 18 BRPM | HEART RATE: 80 BPM | HEIGHT: 67 IN | WEIGHT: 186.31 LBS | DIASTOLIC BLOOD PRESSURE: 72 MMHG | OXYGEN SATURATION: 100 % | SYSTOLIC BLOOD PRESSURE: 110 MMHG | BODY MASS INDEX: 29.24 KG/M2

## 2020-08-06 DIAGNOSIS — Z01.818 PREOP TESTING: Primary | ICD-10-CM

## 2020-08-06 LAB
ALBUMIN SERPL BCP-MCNC: 4.5 G/DL (ref 3.5–5.2)
ALP SERPL-CCNC: 78 U/L (ref 55–135)
ALT SERPL W/O P-5'-P-CCNC: 34 U/L (ref 10–44)
ANION GAP SERPL CALC-SCNC: 9 MMOL/L (ref 8–16)
AST SERPL-CCNC: 38 U/L (ref 10–40)
BASOPHILS # BLD AUTO: 0.02 K/UL (ref 0–0.2)
BASOPHILS NFR BLD: 0.2 % (ref 0–1.9)
BILIRUB SERPL-MCNC: 0.5 MG/DL (ref 0.1–1)
BUN SERPL-MCNC: 30 MG/DL (ref 6–20)
CALCIUM SERPL-MCNC: 10.2 MG/DL (ref 8.7–10.5)
CHLORIDE SERPL-SCNC: 109 MMOL/L (ref 95–110)
CO2 SERPL-SCNC: 23 MMOL/L (ref 23–29)
CREAT SERPL-MCNC: 2.1 MG/DL (ref 0.5–1.4)
DIFFERENTIAL METHOD: ABNORMAL
EOSINOPHIL # BLD AUTO: 0.2 K/UL (ref 0–0.5)
EOSINOPHIL NFR BLD: 1.7 % (ref 0–8)
ERYTHROCYTE [DISTWIDTH] IN BLOOD BY AUTOMATED COUNT: 14 % (ref 11.5–14.5)
EST. GFR  (AFRICAN AMERICAN): 41 ML/MIN/1.73 M^2
EST. GFR  (NON AFRICAN AMERICAN): 35 ML/MIN/1.73 M^2
GLUCOSE SERPL-MCNC: 122 MG/DL (ref 70–110)
HCT VFR BLD AUTO: 36.3 % (ref 40–54)
HGB BLD-MCNC: 11.8 G/DL (ref 14–18)
IMM GRANULOCYTES # BLD AUTO: 0.03 K/UL (ref 0–0.04)
IMM GRANULOCYTES NFR BLD AUTO: 0.3 % (ref 0–0.5)
LYMPHOCYTES # BLD AUTO: 3.6 K/UL (ref 1–4.8)
LYMPHOCYTES NFR BLD: 34.2 % (ref 18–48)
MCH RBC QN AUTO: 27.8 PG (ref 27–31)
MCHC RBC AUTO-ENTMCNC: 32.5 G/DL (ref 32–36)
MCV RBC AUTO: 85 FL (ref 82–98)
MONOCYTES # BLD AUTO: 0.8 K/UL (ref 0.3–1)
MONOCYTES NFR BLD: 7.6 % (ref 4–15)
NEUTROPHILS # BLD AUTO: 5.9 K/UL (ref 1.8–7.7)
NEUTROPHILS NFR BLD: 56 % (ref 38–73)
NRBC BLD-RTO: 0 /100 WBC
PLATELET # BLD AUTO: 219 K/UL (ref 150–350)
PMV BLD AUTO: 10 FL (ref 9.2–12.9)
POTASSIUM SERPL-SCNC: 4.5 MMOL/L (ref 3.5–5.1)
PROT SERPL-MCNC: 8.6 G/DL (ref 6–8.4)
RBC # BLD AUTO: 4.25 M/UL (ref 4.6–6.2)
SODIUM SERPL-SCNC: 141 MMOL/L (ref 136–145)
WBC # BLD AUTO: 10.47 K/UL (ref 3.9–12.7)

## 2020-08-06 PROCEDURE — 93010 EKG 12-LEAD: ICD-10-PCS | Mod: ,,, | Performed by: INTERNAL MEDICINE

## 2020-08-06 PROCEDURE — 93005 ELECTROCARDIOGRAM TRACING: CPT

## 2020-08-06 PROCEDURE — 85025 COMPLETE CBC W/AUTO DIFF WBC: CPT

## 2020-08-06 PROCEDURE — 93010 ELECTROCARDIOGRAM REPORT: CPT | Mod: ,,, | Performed by: INTERNAL MEDICINE

## 2020-08-06 PROCEDURE — 36415 COLL VENOUS BLD VENIPUNCTURE: CPT

## 2020-08-06 PROCEDURE — 80053 COMPREHEN METABOLIC PANEL: CPT

## 2020-08-06 NOTE — DISCHARGE INSTRUCTIONS
Your procedure  is scheduled for __8/13/2020________.    Call [phone] between 2pm and 5pm on __8/12/2020_____to find out your arrival time for the day of surgery.    Report to the Emergency Department on the day of your surgery.  You will be escorted to the admitting unit.    Important instructions:   Do not eat or drink after 12 midnight, including water.  It is okay to brush your teeth.  Do not have gum, candy or mints.     Take only these medications with a small swallow of water on the morning of your surgery __nifedipine____________    Do not take any diabetic medication on the morning of surgery unless instructed to do so by your doctor or pre op nurse.    Stop taking Aspirin, Ibuprofen, Motrin and Aleve , Fish oil, and Vitamin E for at least 7 days before your surgery. You may use Tylenol unless otherwise instructed by your doctor.           Please shower the night before and the morning of your surgery.       You may wear deodorant only.      Do not wear powder, body lotion or perfume/cologne.     Do not wear any jewelry or have any metal on your body.     You will be asked to remove any dentures or partials for the procedure.     Please bring any documents given to you by your doctor.     If you are going home on the same day of surgery, you must arrange for a family member or a friend to drive you home.  Public transportation is prohibited.  You will not be able to drive home if you were given anesthesia or sedation.     Wear loose fitting clothes allowing for bandages.     Please leave money and valuables home.       You may bring your cell phone.     Call the doctor if fever or illness should occur before your surgery.    Call [phone] to contact us here if needed.

## 2020-08-06 NOTE — ANESTHESIA PREPROCEDURE EVALUATION
08/06/2020  Nicholas Nugent is a 50 y.o., male scheduled for REVISION, TYMPANOPLASTY (Right Ear) on 8/13/2020.    The patient was sick in April with an URI. He was tested for COVID but was negative. He was tested for COVID antibody that was positive on 5/18/2020. Denies any URI symptoms, fevers or chills. He has since fully recovered.      Past Medical History:   Diagnosis Date    Basal cell carcinoma 2018    lip    CKD (chronic kidney disease) stage 3, GFR 30-59 ml/min     Diabetes mellitus     Diabetes mellitus type I     Hyperlipidemia     Hypertension     Kidney stones        Past Surgical History:   Procedure Laterality Date    CYSTOLITHOTOMY      INNER EAR SURGERY           Anesthesia Evaluation    I have reviewed the Patient Summary Reports.    I have reviewed the Nursing Notes. I have reviewed the NPO Status.   I have reviewed the Medications.     Review of Systems  Anesthesia Hx:  No problems with previous Anesthesia  Denies Family Hx of Anesthesia complications.   Denies Personal Hx of Anesthesia complications.   Social:  Non-Smoker    Hematology/Oncology:  Hematology Normal        EENT/Dental:EENT/Dental Normal   Cardiovascular:   Hypertension Denies MI.        Functional Capacity good / => 4 METS, Walks 1 hour every day    Pulmonary:  Pulmonary Normal    Renal/:   Chronic Renal Disease CKD 3   Hepatic/GI:  Hepatic/GI Normal    Musculoskeletal:  Musculoskeletal Normal    Neurological:  Neurology Normal    Endocrine:   Diabetes, type 2 Checks blood sugars occasionally, ranges 110-120s   Dermatological:   Hx of basal cell carcinoma, lip   Psych:  Psychiatric Normal           Physical Exam     Dental:  Dental Findings: Lower partial dentures             Anesthesia Plan  Type of Anesthesia, risks & benefits discussed:  Anesthesia Type:  general  Patient's Preference:   Intra-op Monitoring Plan:  standard ASA monitors  Intra-op Monitoring Plan Comments:   Post Op Pain Control Plan:   Post Op Pain Control Plan Comments:   Induction:   IV  Beta Blocker:         Informed Consent:    ASA Score: 2     Day of Surgery Review of History & Physical:        Anesthesia Plan Notes: Anesthesia consent to be signed on day of surgery 8/13/2020.        Ready For Surgery From Anesthesia Perspective.

## 2020-08-06 NOTE — TELEPHONE ENCOUNTER
Spoke with Patient's wife and had concerns about not eating or drinking the night before Surgery since he is a Diabetic.  I spoke with Gayle Martínez RN that pre-op'd Patient said that he can eat or drink before 8 hours before Surgery.  Patient's wife is fine with this and has full understanding.

## 2020-08-11 ENCOUNTER — HOSPITAL ENCOUNTER (OUTPATIENT)
Dept: PREADMISSION TESTING | Facility: HOSPITAL | Age: 50
Discharge: HOME | End: 2020-08-11
Attending: OTOLARYNGOLOGY
Payer: COMMERCIAL

## 2020-08-11 DIAGNOSIS — Z01.818 PREOP TESTING: ICD-10-CM

## 2020-08-11 LAB — SARS-COV-2 RDRP RESP QL NAA+PROBE: NEGATIVE

## 2020-08-11 PROCEDURE — U0002 COVID-19 LAB TEST NON-CDC: HCPCS

## 2020-08-13 ENCOUNTER — ANESTHESIA (OUTPATIENT)
Dept: SURGERY | Facility: HOSPITAL | Age: 50
End: 2020-08-13
Payer: COMMERCIAL

## 2020-08-13 ENCOUNTER — HOSPITAL ENCOUNTER (OUTPATIENT)
Facility: HOSPITAL | Age: 50
Discharge: HOME | End: 2020-08-13
Attending: OTOLARYNGOLOGY | Admitting: OTOLARYNGOLOGY
Payer: COMMERCIAL

## 2020-08-13 VITALS
OXYGEN SATURATION: 100 % | WEIGHT: 186 LBS | SYSTOLIC BLOOD PRESSURE: 123 MMHG | BODY MASS INDEX: 29.19 KG/M2 | HEART RATE: 83 BPM | HEIGHT: 67 IN | RESPIRATION RATE: 20 BRPM | DIASTOLIC BLOOD PRESSURE: 75 MMHG | TEMPERATURE: 98 F

## 2020-08-13 DIAGNOSIS — Z01.818 PREOP TESTING: Primary | ICD-10-CM

## 2020-08-13 DIAGNOSIS — H72.91 PERFORATED EARDRUM, RIGHT: ICD-10-CM

## 2020-08-13 DIAGNOSIS — H72.90: ICD-10-CM

## 2020-08-13 LAB
POCT GLUCOSE: 85 MG/DL (ref 70–110)
POCT GLUCOSE: 91 MG/DL (ref 70–110)

## 2020-08-13 PROCEDURE — 69631 REPAIR EARDRUM STRUCTURES: CPT | Mod: RT,,, | Performed by: OTOLARYNGOLOGY

## 2020-08-13 PROCEDURE — 25000003 PHARM REV CODE 250: Performed by: NURSE ANESTHETIST, CERTIFIED REGISTERED

## 2020-08-13 PROCEDURE — 25000003 PHARM REV CODE 250: Performed by: OTOLARYNGOLOGY

## 2020-08-13 PROCEDURE — D9220A PRA ANESTHESIA: Mod: ,,, | Performed by: ANESTHESIOLOGY

## 2020-08-13 PROCEDURE — 63600175 PHARM REV CODE 636 W HCPCS: Performed by: ANESTHESIOLOGY

## 2020-08-13 PROCEDURE — D9220A PRA ANESTHESIA: ICD-10-PCS | Mod: ,,, | Performed by: ANESTHESIOLOGY

## 2020-08-13 PROCEDURE — 37000009 HC ANESTHESIA EA ADD 15 MINS: Performed by: OTOLARYNGOLOGY

## 2020-08-13 PROCEDURE — 82962 GLUCOSE BLOOD TEST: CPT | Performed by: OTOLARYNGOLOGY

## 2020-08-13 PROCEDURE — 21235 EAR CARTILAGE GRAFT: CPT | Mod: 51,,, | Performed by: OTOLARYNGOLOGY

## 2020-08-13 PROCEDURE — 69631 PR TYMPANOPLASTY: ICD-10-PCS | Mod: RT,,, | Performed by: OTOLARYNGOLOGY

## 2020-08-13 PROCEDURE — 71000015 HC POSTOP RECOV 1ST HR: Performed by: OTOLARYNGOLOGY

## 2020-08-13 PROCEDURE — 36000709 HC OR TIME LEV III EA ADD 15 MIN: Performed by: OTOLARYNGOLOGY

## 2020-08-13 PROCEDURE — 27201423 OPTIME MED/SURG SUP & DEVICES STERILE SUPPLY: Performed by: OTOLARYNGOLOGY

## 2020-08-13 PROCEDURE — 21235 PR GRAFT-EAR CARTILAGE TO NOSE OR EAR: ICD-10-PCS | Mod: 51,,, | Performed by: OTOLARYNGOLOGY

## 2020-08-13 PROCEDURE — 63600175 PHARM REV CODE 636 W HCPCS: Performed by: NURSE ANESTHETIST, CERTIFIED REGISTERED

## 2020-08-13 PROCEDURE — 36000708 HC OR TIME LEV III 1ST 15 MIN: Performed by: OTOLARYNGOLOGY

## 2020-08-13 PROCEDURE — 37000008 HC ANESTHESIA 1ST 15 MINUTES: Performed by: OTOLARYNGOLOGY

## 2020-08-13 PROCEDURE — 71000033 HC RECOVERY, INTIAL HOUR: Performed by: OTOLARYNGOLOGY

## 2020-08-13 PROCEDURE — 71000016 HC POSTOP RECOV ADDL HR: Performed by: OTOLARYNGOLOGY

## 2020-08-13 RX ORDER — HYDROCODONE BITARTRATE AND ACETAMINOPHEN 5; 325 MG/1; MG/1
1 TABLET ORAL EVERY 6 HOURS PRN
Qty: 15 TABLET | Refills: 0 | Status: SHIPPED | OUTPATIENT
Start: 2020-08-13 | End: 2020-08-13 | Stop reason: SDUPTHER

## 2020-08-13 RX ORDER — HYDROMORPHONE HYDROCHLORIDE 2 MG/ML
0.2 INJECTION, SOLUTION INTRAMUSCULAR; INTRAVENOUS; SUBCUTANEOUS EVERY 5 MIN PRN
Status: DISCONTINUED | OUTPATIENT
Start: 2020-08-13 | End: 2020-08-13 | Stop reason: HOSPADM

## 2020-08-13 RX ORDER — ONDANSETRON 4 MG/1
8 TABLET, ORALLY DISINTEGRATING ORAL ONCE
Status: DISCONTINUED | OUTPATIENT
Start: 2020-08-13 | End: 2020-08-13 | Stop reason: HOSPADM

## 2020-08-13 RX ORDER — ACETAMINOPHEN 10 MG/ML
1000 INJECTION, SOLUTION INTRAVENOUS ONCE
Status: COMPLETED | OUTPATIENT
Start: 2020-08-13 | End: 2020-08-13

## 2020-08-13 RX ORDER — MIDAZOLAM HYDROCHLORIDE 1 MG/ML
INJECTION, SOLUTION INTRAMUSCULAR; INTRAVENOUS
Status: DISCONTINUED | OUTPATIENT
Start: 2020-08-13 | End: 2020-08-13

## 2020-08-13 RX ORDER — PROPOFOL 10 MG/ML
VIAL (ML) INTRAVENOUS
Status: DISCONTINUED | OUTPATIENT
Start: 2020-08-13 | End: 2020-08-13

## 2020-08-13 RX ORDER — ONDANSETRON 2 MG/ML
INJECTION INTRAMUSCULAR; INTRAVENOUS
Status: DISCONTINUED | OUTPATIENT
Start: 2020-08-13 | End: 2020-08-13

## 2020-08-13 RX ORDER — SUCCINYLCHOLINE CHLORIDE 20 MG/ML
INJECTION INTRAMUSCULAR; INTRAVENOUS
Status: DISCONTINUED | OUTPATIENT
Start: 2020-08-13 | End: 2020-08-13

## 2020-08-13 RX ORDER — ONDANSETRON 8 MG/1
8 TABLET, ORALLY DISINTEGRATING ORAL EVERY 12 HOURS PRN
Qty: 6 TABLET | Refills: 0 | Status: SHIPPED | OUTPATIENT
Start: 2020-08-13 | End: 2021-03-18

## 2020-08-13 RX ORDER — SODIUM CHLORIDE, SODIUM LACTATE, POTASSIUM CHLORIDE, CALCIUM CHLORIDE 600; 310; 30; 20 MG/100ML; MG/100ML; MG/100ML; MG/100ML
INJECTION, SOLUTION INTRAVENOUS CONTINUOUS
Status: DISCONTINUED | OUTPATIENT
Start: 2020-08-13 | End: 2020-08-13 | Stop reason: HOSPADM

## 2020-08-13 RX ORDER — PHENYLEPHRINE HYDROCHLORIDE 10 MG/ML
INJECTION INTRAVENOUS
Status: DISCONTINUED | OUTPATIENT
Start: 2020-08-13 | End: 2020-08-13

## 2020-08-13 RX ORDER — SODIUM CHLORIDE 0.9 % (FLUSH) 0.9 %
10 SYRINGE (ML) INJECTION
Status: DISCONTINUED | OUTPATIENT
Start: 2020-08-13 | End: 2020-08-13 | Stop reason: HOSPADM

## 2020-08-13 RX ORDER — CEFAZOLIN SODIUM 2 G/50ML
2 SOLUTION INTRAVENOUS
Status: DISCONTINUED | OUTPATIENT
Start: 2020-08-13 | End: 2020-08-13 | Stop reason: HOSPADM

## 2020-08-13 RX ORDER — LIDOCAINE HYDROCHLORIDE 10 MG/ML
1 INJECTION, SOLUTION EPIDURAL; INFILTRATION; INTRACAUDAL; PERINEURAL ONCE
Status: DISCONTINUED | OUTPATIENT
Start: 2020-08-13 | End: 2020-08-13 | Stop reason: HOSPADM

## 2020-08-13 RX ORDER — HYDROCODONE BITARTRATE AND ACETAMINOPHEN 5; 325 MG/1; MG/1
1 TABLET ORAL EVERY 4 HOURS PRN
Status: DISCONTINUED | OUTPATIENT
Start: 2020-08-13 | End: 2020-08-13 | Stop reason: HOSPADM

## 2020-08-13 RX ORDER — NEOMYCIN SULFATE, POLYMYXIN B SULFATE AND HYDROCORTISONE 10; 3.5; 1 MG/ML; MG/ML; [USP'U]/ML
3 SUSPENSION/ DROPS AURICULAR (OTIC) 3 TIMES DAILY
Qty: 10 ML | Refills: 2 | Status: SHIPPED | OUTPATIENT
Start: 2020-08-13 | End: 2020-08-27 | Stop reason: SDUPTHER

## 2020-08-13 RX ORDER — FENTANYL CITRATE 50 UG/ML
INJECTION, SOLUTION INTRAMUSCULAR; INTRAVENOUS
Status: DISCONTINUED | OUTPATIENT
Start: 2020-08-13 | End: 2020-08-13

## 2020-08-13 RX ORDER — LIDOCAINE HYDROCHLORIDE 20 MG/ML
INJECTION INTRAVENOUS
Status: DISCONTINUED | OUTPATIENT
Start: 2020-08-13 | End: 2020-08-13

## 2020-08-13 RX ORDER — EPHEDRINE SULFATE 50 MG/ML
INJECTION, SOLUTION INTRAVENOUS
Status: DISCONTINUED | OUTPATIENT
Start: 2020-08-13 | End: 2020-08-13

## 2020-08-13 RX ORDER — HYDROCODONE BITARTRATE AND ACETAMINOPHEN 5; 325 MG/1; MG/1
1 TABLET ORAL EVERY 6 HOURS PRN
Qty: 15 TABLET | Refills: 0 | Status: SHIPPED | OUTPATIENT
Start: 2020-08-13 | End: 2020-08-20

## 2020-08-13 RX ORDER — OFLOXACIN 3 MG/ML
2 SOLUTION/ DROPS OPHTHALMIC ONCE
Status: DISCONTINUED | OUTPATIENT
Start: 2020-08-13 | End: 2020-08-13

## 2020-08-13 RX ADMIN — SUCCINYLCHOLINE CHLORIDE 140 MG: 20 INJECTION, SOLUTION INTRAMUSCULAR; INTRAVENOUS at 01:08

## 2020-08-13 RX ADMIN — ACETAMINOPHEN 1000 MG: 10 INJECTION, SOLUTION INTRAVENOUS at 04:08

## 2020-08-13 RX ADMIN — PROPOFOL 180 MG: 10 INJECTION, EMULSION INTRAVENOUS at 01:08

## 2020-08-13 RX ADMIN — SODIUM CHLORIDE, SODIUM LACTATE, POTASSIUM CHLORIDE, AND CALCIUM CHLORIDE: .6; .31; .03; .02 INJECTION, SOLUTION INTRAVENOUS at 10:08

## 2020-08-13 RX ADMIN — EPHEDRINE SULFATE 10 MG: 50 INJECTION INTRAVENOUS at 03:08

## 2020-08-13 RX ADMIN — MIDAZOLAM HYDROCHLORIDE 2 MG: 1 INJECTION, SOLUTION INTRAMUSCULAR; INTRAVENOUS at 01:08

## 2020-08-13 RX ADMIN — EPHEDRINE SULFATE 10 MG: 50 INJECTION INTRAVENOUS at 02:08

## 2020-08-13 RX ADMIN — PHENYLEPHRINE HYDROCHLORIDE 100 MCG: 10 INJECTION INTRAVENOUS at 02:08

## 2020-08-13 RX ADMIN — FENTANYL CITRATE 100 MCG: 50 INJECTION INTRAMUSCULAR; INTRAVENOUS at 01:08

## 2020-08-13 RX ADMIN — Medication 100 MG: at 01:08

## 2020-08-13 RX ADMIN — SODIUM CHLORIDE, SODIUM LACTATE, POTASSIUM CHLORIDE, AND CALCIUM CHLORIDE: .6; .31; .03; .02 INJECTION, SOLUTION INTRAVENOUS at 02:08

## 2020-08-13 RX ADMIN — CEFAZOLIN SODIUM 2 G: 1 POWDER, FOR SOLUTION INTRAMUSCULAR; INTRAVENOUS at 01:08

## 2020-08-13 RX ADMIN — HYDROCODONE BITARTRATE AND ACETAMINOPHEN 1 TABLET: 5; 325 TABLET ORAL at 05:08

## 2020-08-13 RX ADMIN — SODIUM CHLORIDE, SODIUM LACTATE, POTASSIUM CHLORIDE, AND CALCIUM CHLORIDE: .6; .31; .03; .02 INJECTION, SOLUTION INTRAVENOUS at 01:08

## 2020-08-13 RX ADMIN — PHENYLEPHRINE HYDROCHLORIDE 100 MCG: 10 INJECTION INTRAVENOUS at 03:08

## 2020-08-13 RX ADMIN — ONDANSETRON 4 MG: 2 INJECTION, SOLUTION INTRAMUSCULAR; INTRAVENOUS at 03:08

## 2020-08-13 NOTE — OP NOTE
[facility]  Surgery Department  Operative Note    SUMMARY     Date of Procedure: 8/13/2020     Procedure: Procedure(s) (LRB):  REVISION, TYMPANOPLASTY (Right)   Cartilage graft of right tragus of ear     Surgeon(s) and Role:     * Jean-Paul Lucero MD - Primary    Assisting Surgeon: None    Pre-Operative Diagnosis: Perforated eardrum, right [H72.91]    Post-Operative Diagnosis: Post-Op Diagnosis Codes:     * Perforated eardrum, right [H72.91]    Anesthesia: General    Technical Procedures Used: transcanal underlay tragal perichondrium-cartilage graft    Description of the Findings of the Procedure:   1.  Anterior perforation 30% dry  2.  Severe scarring of middle ear and around ossicles. Ossicles intact and somewhat mobile, however encased in scar tissue.    3.  No visible or patent ET  4.  Scar tissue debrided from middle ear, cartilage-perichondrial tragal graft used to graft tympanic membrane    Significant Surgical Tasks Conducted by the Assistant(s), if Applicable: n/a    Complications: No    Estimated Blood Loss (EBL): 5ml    Procedure in detail:    The patient was taken to the operating room, placed under general anesthetic and intubated without difficulty.     We prepped and draped the ear in a sterile fashion. Using the operating microscopethe ear canal and tympanic membrane were examined.  There was a 30% anterior perforation.  The ear canal was then injected with 1% lidocaine with epinephrine until appropriate blanching was achieved.    Using a galicia the edges of the perforation were freshened and removed with a cupped forceps.  An incision was made in the ear canal 5 mm from the annulus using a round knife.  The flap was elevated to the annulus an the middle ear was entered using a galicia needle to incise the mucosa. There is severe scarring the middle ear the chorda tympani was involved with scar tissue and was cut.  The incus stapedial joint completely covered in fibrous tissue which was  lysed.  The joint was checked for mobility and confirmed mobility of the incus and stapes.  The annulus was elevated inferiorly to 5 o' clock.  Perforation was identified anterior portion the tympanic membrane.  The edges of the perforation were connected to the middle ear with scar tissue.  Scar tissue was lysed sharply excised.  A pocket was created anterior to the perforation to fit the grafting create space for aeration.  I then proceeded to harvest a tragal perichondrial graft    A cartilage-perichondrial graft was harvested from a separate incision made on the posterior side of the tragus.  An incision was made with a 15 blade cutting through the cartilage medial to the dome of the tragus.  The perichondrium was dissected free from the surrounding soft tissue and the graft was truncated from the incisura.  The incision was closed using fast absorbing suture.     The perichondrium was removed from 1 side of the cartilage while leaving a layer of perichondrium on the opposite side.  Cartilage was cut to size to fit the perforation.    Returning to the middle ear the cartilage was placed in underlay fashion to the tympanic membrane. Gelfoam soaked in antibiotic solution was placed medial to the cartilage graft and the graft was pressed firmly to the native tympanic membrane. The flaps of the tympanic membrane were laid back down into place and all the edges of the perforation were checked for coverage.  The graft appeared to have very good placement.  The lateral surface of the reconstructed tympanic membrane was then covered with Gelfoam soaked in antibiotic solution.  Cotton ball was placed in the ear canal. Patient was in awakened and brought to the recovery room     The patient was turned over to anesthesia and awakened from the procedure.               Implants: * No implants in log *    Specimens:   Specimen (12h ago, onward)    None                  Condition: Good    Disposition: PACU - hemodynamically  stable.    Attestation: I was present and scrubbed for the entire procedure.

## 2020-08-13 NOTE — BRIEF OP NOTE
[facility]  Brief Operative Note    Surgery Date: 8/13/2020     Surgeon(s) and Role:     * Jean-Paul Lucero MD - Primary    Assisting Surgeon: None    Pre-op Diagnosis:  Perforated eardrum, right [H72.91]    Post-op Diagnosis:  Post-Op Diagnosis Codes:     * Perforated eardrum, right [H72.91]    Procedure(s) (LRB):  REVISION, TYMPANOPLASTY (Right)  Cartilage graft from tragus of ear right    Anesthesia: General    Description of the findings of the procedure(s):   1.  Anterior perforation 30% dry  2.  Severe scarring of middle ear and around ossicles. Ossicles intact and somewhat mobile, however encased in scar tissue.    3.  No visible or patent ET  4.  Scar tissue debrided from middle ear, cartilage-perichondrial tragal graft used to graft tympanic membrane    Estimated Blood Loss: 5ml    Specimens:   Specimen (12h ago, onward)    None            Discharge Note    OUTCOME: Patient tolerated treatment/procedure well without complication and is now ready for discharge.    DISPOSITION: Home or Self Care    FINAL DIAGNOSIS:  Tympanic membrane perforation right ear    FOLLOWUP: In clinic    DISCHARGE INSTRUCTIONS:    Discharge Procedure Orders   Diet general

## 2020-08-13 NOTE — TRANSFER OF CARE
"Anesthesia Transfer of Care Note    Patient: Nicholas Nugent    Procedure(s) Performed: Procedure(s) (LRB):  REVISION, TYMPANOPLASTY (Right)    Patient location: PACU    Anesthesia Type: general    Transport from OR: Transported from OR on room air with adequate spontaneous ventilation    Post pain: adequate analgesia    Post assessment: no apparent anesthetic complications and tolerated procedure well    Post vital signs: stable    Level of consciousness: sedated    Nausea/Vomiting: no nausea/vomiting    Complications: none    Transfer of care protocol was followed      Last vitals:   Visit Vitals  /67 (BP Location: Right arm, Patient Position: Lying)   Pulse 90   Temp 36.5 °C (97.7 °F) (Oral)   Resp 16   Ht 5' 7" (1.702 m)   Wt 84.4 kg (186 lb)   SpO2 99%   BMI 29.13 kg/m²     "

## 2020-08-13 NOTE — DISCHARGE INSTRUCTIONS
Tympanoplasty or Mastoidectomy Post-operative Instructions    Precautions  1.  Do not blow your nose until your physician has indicated that your ear is healed.  Any accumulated secretions in the nose may be drawn back into the throat and expectorated if desired.  This particularly important if you develop a cold.   2. Do not pop your ears by holding your nose and blowing air through the eustachian tube into the ear.  If it is necessary to sneeze, do so with your mouth open.  3. Do not allow water to enter the ear until advised by your physician that he ear is healed.  Until such time, when showering or washing your hair, cotton may be placed in the outer ear opening and covered in Vaseline.  If an incision was made in the skin behind your ear, water should be kept away from this area for 1 week.  4. Do not take an unnecessary chance of catching a cold.  Avoid undue exposure or fatigue.  Should you catch a cold, treat it in your usual way, reporting to your physician if you develop ear symptoms  5. You may anticipate a certain amount of pulsation, popping, clicking, and other sounds in the ear, and a feeling of fullness in the ear.  Occasional sharp shooting pains are not unusual.  Sometimes it may feel as if there is liquid in the ear.  6. Do not plan to drive a car home from the hospital.  Air travel is ok 2 days after surgery.  When changing altitude, you should remain awake and chew gum to stimulate swallowing.  Dizziness  Minor dizziness may be present on head motion and not concern you unless it increases  Hearing  Hearing may be worse temporarily after surgery due to swelling and packing in the ear canal.  An improvement may be noted after 6-8 weeks, while maximum improvement may take up to 4-6 months.  Discharge  A bloody or Watery discharge may occur during the healing period.  The outer ear cotton may be changed if necessary   A purulent discharge at any time is an indication to call to make an  appointment  Pain  Mild, intermittent ear pain is not unusual during the first 2 weeks.  Pain above or in front of the ear is common when chewing.  If you have persistent unrelenting pain please let your physician know  Ear Drops  If you were given ear drops please start 1 week after surgery.  Place a few drops in the ear twice a day to loosen the packing which will run out of the ear as a liquid.  Tip the head to the side, place two drops in the ear, and allow them to remain for 5 minutes.  The tip the head to the opposite direction to allow the drops to run out.  Continue using until advised otherwise by your physician.

## 2020-08-13 NOTE — ANESTHESIA PROCEDURE NOTES
Intubation  Performed by: Vivienne Nix CRNA  Authorized by: Key So MD     Intubation:     Induction:  Intravenous    Intubated:  Postinduction    Mask Ventilation:  Easy mask    Attempts:  1    Attempted By:  CRNA    Method of Intubation:  Direct    Blade:  Soheila 3    Laryngeal View Grade: Grade IIA - cords partially seen      Difficult Airway Encountered?: No      Complications:  None    Airway Device:  Oral endotracheal tube    Airway Device Size:  7.5    Style/Cuff Inflation:  Cuffed    Inflation Amount (mL):  6    Tube secured:  22    Secured at:  The lips    Placement Verified By:  Capnometry    Complicating Factors:  None    Findings Post-Intubation:  BS equal bilateral

## 2020-08-13 NOTE — H&P
H&P completed on 7/11/20  has been reviewed, the patient has been examined and:  I concur with the findings and no changes have occurred since H&P was written.    Active Hospital Problems    Diagnosis  POA    Perforated eardrum [H72.90]  Yes      Resolved Hospital Problems   No resolved problems to display.     Subjective:       Patient ID: Nicholas Nugent is a 50 y.o. male.    Chief Complaint: No chief complaint on file.    HPI      Nicholas Nugent is a 50 y.o. male presents for right ear tympanic membrane perforation.  He notes associated hearing loss.  The perforation has been presents for decades.  He mala otorrhea.  He has had surgery twice before without success    Review of Systems   Constitutional: Negative for chills, fever and unexpected weight change.   HENT: Negative for sore throat and trouble swallowing.    Eyes: Negative for pain and visual disturbance.   Respiratory: Negative for apnea, chest tightness and shortness of breath.    Cardiovascular: Negative for chest pain and palpitations.   Gastrointestinal: Negative for abdominal pain and nausea.   Endocrine: Negative for cold intolerance and heat intolerance.   Musculoskeletal: Negative for joint swelling and neck stiffness.   Integumentary:  Negative for color change and rash.   Neurological: Negative for facial asymmetry and headaches.   Hematological: Negative for adenopathy. Does not bruise/bleed easily.   Psychiatric/Behavioral: Negative for agitation. The patient is not nervous/anxious.          Objective:      Physical Exam  Constitutional:       Appearance: He is well-developed.   HENT:      Head: Normocephalic and atraumatic.      Right Ear: Ear canal and external ear normal. Decreased hearing noted. Tympanic membrane is scarred and perforated.      Left Ear: Tympanic membrane, ear canal and external ear normal.      Ears:        Nose: Nose normal.      Mouth/Throat:      Pharynx: Uvula midline.   Neck:      Musculoskeletal: Normal range of  motion.      Thyroid: No thyroid mass.       Data:    Tympanometry revealed a Type B tympanogram with a 2.0 ear canal volume in the right ear and a Type A tympanogram with a 2.1 ear canal volume for the left ear.  Audiogram results revealed a mild to moderate sensorineural hearing loss for the left ear and a severe mixed hearing loss for the right ear.  Speech Audiometry revealed a speech reception threshold at 25dBHL with a word recognition score of 76% for the left ear and a speech reception threshold at 85dBHL and no word recognition score could be obtained due to the high intensity level for the right     Assessment:       1. Preop testing    2. Perforated eardrum    3. Perforated eardrum, right        Plan:     In summary this is a pleasant 50 y.o. with chronic right TM perf who has multiple failed surgeries.  He continue to desire closure of his TM so he can swim.  I discussed this is possible but may not fully improve his conductive hearing loss.     Discussed Right Tympanoplasty with patient.Discussed possibility of long term tube placement, need for cavity, meatoplasty, cavity care, long term follow up, need for secondary procedures. Risks, complications, alternatives and goals reviewed including possible infection, bleeding, hearing loss, chronic drainage, facial nerve problems, dural injury and other potential problems.        Ok for surgery 8/13/20

## 2020-08-14 DIAGNOSIS — Z11.59 NEED FOR HEPATITIS C SCREENING TEST: ICD-10-CM

## 2020-08-14 NOTE — ANESTHESIA POSTPROCEDURE EVALUATION
Anesthesia Post Evaluation    Patient: Nicholas Nugent    Procedure(s) Performed: Procedure(s) (LRB):  REVISION, TYMPANOPLASTY (Right)    Final Anesthesia Type: general    Patient location during evaluation: PACU  Patient participation: Yes- Able to Participate  Level of consciousness: awake and alert  Post-procedure vital signs: reviewed and stable  Airway patency: patent  ALEC mitigation strategies: Multimodal analgesia and Extubation while patient is awake  PONV status at discharge: No PONV  Anesthetic complications: no      Cardiovascular status: blood pressure returned to baseline  Respiratory status: unassisted and spontaneous ventilation  Hydration status: euvolemic  Follow-up not needed.          Vitals Value Taken Time   /75 08/13/20 1815   Temp 36.4 °C (97.6 °F) 08/13/20 1815   Pulse 83 08/13/20 1815   Resp 20 08/13/20 1815   SpO2 100 % 08/13/20 1815         Event Time   Out of Recovery 16:50:00         Pain/Oh Score: Pain Rating Prior to Med Admin: 6 (8/13/2020  5:47 PM)  Pain Rating Post Med Admin: 4 (8/13/2020  6:23 PM)  Oh Score: 10 (8/13/2020  6:23 PM)  Modified Oh Score: 20 (8/13/2020  6:23 PM)

## 2020-08-21 ENCOUNTER — PATIENT OUTREACH (OUTPATIENT)
Dept: ADMINISTRATIVE | Facility: OTHER | Age: 50
End: 2020-08-21

## 2020-08-21 NOTE — PROGRESS NOTES
LINKS immunization registry updated  Care Everywhere updated  Health Maintenance updated  Chart reviewed for overdue [facility]active [facility]) health maintenance testing (CRS, Breast Ca, Diabetic Eye Exam)   Orders entered:N/A

## 2020-08-27 ENCOUNTER — OFFICE VISIT (OUTPATIENT)
Dept: OTOLARYNGOLOGY | Facility: CLINIC | Age: 50
End: 2020-08-27
Payer: COMMERCIAL

## 2020-08-27 VITALS
DIASTOLIC BLOOD PRESSURE: 70 MMHG | BODY MASS INDEX: 29.57 KG/M2 | TEMPERATURE: 98 F | SYSTOLIC BLOOD PRESSURE: 100 MMHG | HEIGHT: 67 IN | WEIGHT: 188.38 LBS

## 2020-08-27 DIAGNOSIS — H66.11 CHRONIC TUBOTYMPANIC SUPPURATIVE OTITIS MEDIA, RIGHT EAR: Primary | ICD-10-CM

## 2020-08-27 PROCEDURE — 99024 POSTOP FOLLOW-UP VISIT: CPT | Mod: S$GLB,,, | Performed by: OTOLARYNGOLOGY

## 2020-08-27 PROCEDURE — 99024 PR POST-OP FOLLOW-UP VISIT: ICD-10-PCS | Mod: S$GLB,,, | Performed by: OTOLARYNGOLOGY

## 2020-08-27 RX ORDER — NEOMYCIN SULFATE, POLYMYXIN B SULFATE AND HYDROCORTISONE 10; 3.5; 1 MG/ML; MG/ML; [USP'U]/ML
3 SUSPENSION/ DROPS AURICULAR (OTIC) 3 TIMES DAILY
Qty: 10 ML | Refills: 2 | Status: SHIPPED | OUTPATIENT
Start: 2020-08-27 | End: 2022-06-10

## 2020-08-27 NOTE — PROGRESS NOTES
S/p AD revision tympanoplasty    Packing vacuumed out of ear with microscope.    Graft intact and healing well.    Patient tolerated well.    RTC 3 weeks.with audio    Continue ear drops as directed.

## 2020-09-02 ENCOUNTER — TELEPHONE (OUTPATIENT)
Dept: FAMILY MEDICINE | Facility: CLINIC | Age: 50
End: 2020-09-02

## 2020-09-02 DIAGNOSIS — N52.9 ERECTILE DYSFUNCTION, UNSPECIFIED ERECTILE DYSFUNCTION TYPE: Primary | ICD-10-CM

## 2020-09-02 NOTE — TELEPHONE ENCOUNTER
----- Message from Nataliya Rivas sent at 9/2/2020 10:31 AM CDT -----  Contact: Esther Ybarra (Other)  Type: Patient Call Back    Who called: Esther Ybarra (Other)    What is the request in detail: Esthre Ybarra (Other) is requesting a call back in regards to an referral for urology. She states that she was advised that the PCP needs to send over the referral to   [facility]  [address] [place] [address] [place] [phone]  Please advise.    Can the clinic reply by [facility]? No    Best call back number: [phone]    Additional Information: N/A

## 2020-09-02 NOTE — TELEPHONE ENCOUNTER
Sent a message to Dr. Olmedo that pt requesting a Urology. Called pt wife back but had me hold for 5 mins. Will contact wife again to let her know Dr. Olmedo will be out of office until next week.

## 2020-09-02 NOTE — TELEPHONE ENCOUNTER
Pt wife is requesting a referral be put in for Urology at [facility] with Pablo J. Labadie, MD. Pt PCP is out of the office until next week and pt would like to schedule the next available appointment. Message routed to Griffin Farias NP to consider Referral to be further evaluated for erectile dysfunction.

## 2020-09-11 DIAGNOSIS — Z12.12 SCREENING FOR COLORECTAL CANCER: Primary | ICD-10-CM

## 2020-09-11 DIAGNOSIS — Z12.11 SCREENING FOR COLORECTAL CANCER: Primary | ICD-10-CM

## 2020-09-11 DIAGNOSIS — Z12.11 COLON CANCER SCREENING: ICD-10-CM

## 2020-09-18 ENCOUNTER — PATIENT OUTREACH (OUTPATIENT)
Dept: ADMINISTRATIVE | Facility: HOSPITAL | Age: 50
End: 2020-09-18

## 2020-09-24 ENCOUNTER — CLINICAL SUPPORT (OUTPATIENT)
Dept: AUDIOLOGY | Facility: CLINIC | Age: 50
End: 2020-09-24
Payer: COMMERCIAL

## 2020-09-24 ENCOUNTER — OFFICE VISIT (OUTPATIENT)
Dept: OTOLARYNGOLOGY | Facility: CLINIC | Age: 50
End: 2020-09-24
Payer: COMMERCIAL

## 2020-09-24 VITALS — TEMPERATURE: 99 F | HEIGHT: 67 IN | BODY MASS INDEX: 29.58 KG/M2 | WEIGHT: 188.5 LBS

## 2020-09-24 DIAGNOSIS — H90.A31 MIXED CONDUCTIVE AND SENSORINEURAL HEARING LOSS OF RIGHT EAR WITH RESTRICTED HEARING OF LEFT EAR: Primary | ICD-10-CM

## 2020-09-24 DIAGNOSIS — Z09 POSTOPERATIVE EXAMINATION: Primary | ICD-10-CM

## 2020-09-24 PROCEDURE — 92550 TYMPANOMETRY & REFLEX THRESH: CPT | Mod: S$GLB,,, | Performed by: AUDIOLOGIST

## 2020-09-24 PROCEDURE — 99024 PR POST-OP FOLLOW-UP VISIT: ICD-10-PCS | Mod: S$GLB,,, | Performed by: OTOLARYNGOLOGY

## 2020-09-24 PROCEDURE — 99024 POSTOP FOLLOW-UP VISIT: CPT | Mod: S$GLB,,, | Performed by: OTOLARYNGOLOGY

## 2020-09-24 PROCEDURE — 92557 COMPREHENSIVE HEARING TEST: CPT | Mod: S$GLB,,, | Performed by: AUDIOLOGIST

## 2020-09-24 PROCEDURE — 92557 PR COMPREHENSIVE HEARING TEST: ICD-10-PCS | Mod: S$GLB,,, | Performed by: AUDIOLOGIST

## 2020-09-24 PROCEDURE — 92550 PR TYMPANOMETRY AND REFLEX THRESHOLD MEASUREMENTS: ICD-10-PCS | Mod: S$GLB,,, | Performed by: AUDIOLOGIST

## 2020-09-24 NOTE — PROGRESS NOTES
Post auricular incision healing well.    Packing vacuumed out of ear with microscope.    Graft intact and healing well.    Patient tolerated well.    Audio unchanged from pre op, not surprising give absence of middle ear ventilation.      Recommend hearing aid evaluation vs bone conducting aid

## 2020-09-24 NOTE — PROGRESS NOTES
Click on link to view Audiogram  Document on 9/24/2020  9:04 AM by Radha Lara MA CCC-A: Audiogram     Nicholas Nuegnt was seen today for an audiologic evaluation for a Tympanoplasty follow up.      Tympanometry revealed a Type B tympanogram with a 1.8 ear canal volume for the right ear and a Type A tympanogram with a 2.2 ear canal volume for the left ear.  Audiogram results revealed a mild to moderate sensorineural hearing loss for the left ear and a severe mixed hearing loss for the right ear.  Speech audiometry revealed a speech reception threshold at 30dBHL with a word recognition score of 92% for the left ear and a speech reception threshold at 75dBHL with a word recognition score of 68% for the right ear.    Recommendations:  1. Otologic evaluation  2. Annual evaluation to monitor hearing sensitivity  3. Consider hearing aids

## 2020-10-01 ENCOUNTER — PATIENT OUTREACH (OUTPATIENT)
Dept: ADMINISTRATIVE | Facility: HOSPITAL | Age: 50
End: 2020-10-01

## 2020-10-14 LAB — GASTROCULT GAST QL: NEGATIVE

## 2020-10-21 ENCOUNTER — PATIENT OUTREACH (OUTPATIENT)
Dept: ADMINISTRATIVE | Facility: HOSPITAL | Age: 50
End: 2020-10-21

## 2020-12-04 DIAGNOSIS — E11.9 TYPE 2 DIABETES MELLITUS WITHOUT COMPLICATION: ICD-10-CM

## 2021-01-04 ENCOUNTER — PATIENT MESSAGE (OUTPATIENT)
Dept: ADMINISTRATIVE | Facility: HOSPITAL | Age: 51
End: 2021-01-04

## 2021-03-18 ENCOUNTER — OFFICE VISIT (OUTPATIENT)
Dept: FAMILY MEDICINE | Facility: CLINIC | Age: 51
End: 2021-03-18
Payer: COMMERCIAL

## 2021-03-18 VITALS
OXYGEN SATURATION: 99 % | HEART RATE: 86 BPM | BODY MASS INDEX: 29.13 KG/M2 | DIASTOLIC BLOOD PRESSURE: 78 MMHG | HEIGHT: 67 IN | SYSTOLIC BLOOD PRESSURE: 128 MMHG | TEMPERATURE: 98 F | RESPIRATION RATE: 18 BRPM | WEIGHT: 185.63 LBS

## 2021-03-18 DIAGNOSIS — E78.2 MIXED DYSLIPIDEMIA: ICD-10-CM

## 2021-03-18 DIAGNOSIS — E21.0 PRIMARY HYPERPARATHYROIDISM: ICD-10-CM

## 2021-03-18 DIAGNOSIS — N52.1 TYPE 2 DIABETES MELLITUS WITH NEUROPATHY CAUSING ERECTILE DYSFUNCTION: ICD-10-CM

## 2021-03-18 DIAGNOSIS — Z11.59 NEED FOR HEPATITIS C SCREENING TEST: ICD-10-CM

## 2021-03-18 DIAGNOSIS — N52.9 ERECTILE DYSFUNCTION, UNSPECIFIED ERECTILE DYSFUNCTION TYPE: ICD-10-CM

## 2021-03-18 DIAGNOSIS — J31.0 CHRONIC RHINITIS: ICD-10-CM

## 2021-03-18 DIAGNOSIS — H93.12 LEFT-SIDED TINNITUS: ICD-10-CM

## 2021-03-18 DIAGNOSIS — I10 BENIGN HYPERTENSION: ICD-10-CM

## 2021-03-18 DIAGNOSIS — N18.32 STAGE 3B CHRONIC KIDNEY DISEASE: ICD-10-CM

## 2021-03-18 DIAGNOSIS — Z00.00 GENERAL MEDICAL EXAM: Primary | ICD-10-CM

## 2021-03-18 DIAGNOSIS — E11.40 TYPE 2 DIABETES MELLITUS WITH NEUROPATHY CAUSING ERECTILE DYSFUNCTION: ICD-10-CM

## 2021-03-18 PROCEDURE — 99999 PR PBB SHADOW E&M-EST. PATIENT-LVL IV: ICD-10-PCS | Mod: PBBFAC,,, | Performed by: FAMILY MEDICINE

## 2021-03-18 PROCEDURE — 99396 PR PREVENTIVE VISIT,EST,40-64: ICD-10-PCS | Mod: S$GLB,,, | Performed by: FAMILY MEDICINE

## 2021-03-18 PROCEDURE — 99999 PR PBB SHADOW E&M-EST. PATIENT-LVL IV: CPT | Mod: PBBFAC,,, | Performed by: FAMILY MEDICINE

## 2021-03-18 PROCEDURE — 99396 PREV VISIT EST AGE 40-64: CPT | Mod: S$GLB,,, | Performed by: FAMILY MEDICINE

## 2021-03-18 RX ORDER — IPRATROPIUM BROMIDE 42 UG/1
2 SPRAY, METERED NASAL 4 TIMES DAILY
Qty: 15 ML | Refills: 5 | Status: SHIPPED | OUTPATIENT
Start: 2021-03-18 | End: 2021-03-23

## 2021-04-06 ENCOUNTER — PATIENT MESSAGE (OUTPATIENT)
Dept: ADMINISTRATIVE | Facility: HOSPITAL | Age: 51
End: 2021-04-06

## 2021-04-16 ENCOUNTER — PATIENT MESSAGE (OUTPATIENT)
Dept: RESEARCH | Facility: HOSPITAL | Age: 51
End: 2021-04-16

## 2021-06-07 ENCOUNTER — PATIENT OUTREACH (OUTPATIENT)
Dept: ADMINISTRATIVE | Facility: HOSPITAL | Age: 51
End: 2021-06-07

## 2021-06-16 ENCOUNTER — PATIENT MESSAGE (OUTPATIENT)
Dept: OTOLARYNGOLOGY | Facility: CLINIC | Age: 51
End: 2021-06-16

## 2021-06-24 ENCOUNTER — OFFICE VISIT (OUTPATIENT)
Dept: OTOLARYNGOLOGY | Facility: CLINIC | Age: 51
End: 2021-06-24
Payer: COMMERCIAL

## 2021-06-24 VITALS
HEIGHT: 67 IN | TEMPERATURE: 98 F | BODY MASS INDEX: 30 KG/M2 | WEIGHT: 191.13 LBS | DIASTOLIC BLOOD PRESSURE: 78 MMHG | SYSTOLIC BLOOD PRESSURE: 128 MMHG

## 2021-06-24 DIAGNOSIS — H66.11 CHRONIC TUBOTYMPANIC SUPPURATIVE OTITIS MEDIA, RIGHT EAR: Primary | ICD-10-CM

## 2021-06-24 DIAGNOSIS — H93.13 TINNITUS AURIUM, BILATERAL: ICD-10-CM

## 2021-06-24 PROCEDURE — 99213 PR OFFICE/OUTPT VISIT, EST, LEVL III, 20-29 MIN: ICD-10-PCS | Mod: S$GLB,,, | Performed by: OTOLARYNGOLOGY

## 2021-06-24 PROCEDURE — 99213 OFFICE O/P EST LOW 20 MIN: CPT | Mod: S$GLB,,, | Performed by: OTOLARYNGOLOGY

## 2021-07-07 ENCOUNTER — PATIENT MESSAGE (OUTPATIENT)
Dept: ADMINISTRATIVE | Facility: HOSPITAL | Age: 51
End: 2021-07-07

## 2021-10-04 ENCOUNTER — PATIENT MESSAGE (OUTPATIENT)
Dept: ADMINISTRATIVE | Facility: HOSPITAL | Age: 51
End: 2021-10-04

## 2022-01-11 ENCOUNTER — PATIENT MESSAGE (OUTPATIENT)
Dept: ADMINISTRATIVE | Facility: HOSPITAL | Age: 52
End: 2022-01-11
Payer: COMMERCIAL

## 2022-05-31 ENCOUNTER — PATIENT MESSAGE (OUTPATIENT)
Dept: ADMINISTRATIVE | Facility: HOSPITAL | Age: 52
End: 2022-05-31
Payer: COMMERCIAL

## 2022-06-10 ENCOUNTER — OFFICE VISIT (OUTPATIENT)
Dept: PRIMARY CARE CLINIC | Facility: CLINIC | Age: 52
End: 2022-06-10
Payer: COMMERCIAL

## 2022-06-10 VITALS
BODY MASS INDEX: 30.74 KG/M2 | WEIGHT: 195.88 LBS | DIASTOLIC BLOOD PRESSURE: 82 MMHG | SYSTOLIC BLOOD PRESSURE: 132 MMHG | OXYGEN SATURATION: 100 % | HEIGHT: 67 IN | HEART RATE: 85 BPM

## 2022-06-10 DIAGNOSIS — Z13.5 SCREENING FOR DIABETIC RETINOPATHY: ICD-10-CM

## 2022-06-10 DIAGNOSIS — N52.9 ERECTILE DYSFUNCTION, UNSPECIFIED ERECTILE DYSFUNCTION TYPE: ICD-10-CM

## 2022-06-10 DIAGNOSIS — E21.3 HYPERPARATHYROIDISM: ICD-10-CM

## 2022-06-10 DIAGNOSIS — Z11.59 NEED FOR HEPATITIS C SCREENING TEST: ICD-10-CM

## 2022-06-10 DIAGNOSIS — N52.1 TYPE 2 DIABETES MELLITUS WITH NEUROPATHY CAUSING ERECTILE DYSFUNCTION: Primary | ICD-10-CM

## 2022-06-10 DIAGNOSIS — I10 ESSENTIAL HYPERTENSION, BENIGN: ICD-10-CM

## 2022-06-10 DIAGNOSIS — E11.40 TYPE 2 DIABETES MELLITUS WITH NEUROPATHY CAUSING ERECTILE DYSFUNCTION: Primary | ICD-10-CM

## 2022-06-10 DIAGNOSIS — N18.32 STAGE 3B CHRONIC KIDNEY DISEASE: ICD-10-CM

## 2022-06-10 PROCEDURE — 3052F PR MOST RECENT HEMOGLOBIN A1C LEVEL 8.0 - < 9.0%: ICD-10-PCS | Mod: CPTII,S$GLB,, | Performed by: FAMILY MEDICINE

## 2022-06-10 PROCEDURE — 3008F PR BODY MASS INDEX (BMI) DOCUMENTED: ICD-10-PCS | Mod: CPTII,S$GLB,, | Performed by: FAMILY MEDICINE

## 2022-06-10 PROCEDURE — 1159F PR MEDICATION LIST DOCUMENTED IN MEDICAL RECORD: ICD-10-PCS | Mod: CPTII,S$GLB,, | Performed by: FAMILY MEDICINE

## 2022-06-10 PROCEDURE — 3008F BODY MASS INDEX DOCD: CPT | Mod: CPTII,S$GLB,, | Performed by: FAMILY MEDICINE

## 2022-06-10 PROCEDURE — 4010F ACE/ARB THERAPY RXD/TAKEN: CPT | Mod: CPTII,S$GLB,, | Performed by: FAMILY MEDICINE

## 2022-06-10 PROCEDURE — 3060F PR POS MICROALBUMINURIA RESULT DOCUMENTED/REVIEW: ICD-10-PCS | Mod: CPTII,S$GLB,, | Performed by: FAMILY MEDICINE

## 2022-06-10 PROCEDURE — 99214 OFFICE O/P EST MOD 30 MIN: CPT | Mod: S$GLB,,, | Performed by: FAMILY MEDICINE

## 2022-06-10 PROCEDURE — 3075F PR MOST RECENT SYSTOLIC BLOOD PRESS GE 130-139MM HG: ICD-10-PCS | Mod: CPTII,S$GLB,, | Performed by: FAMILY MEDICINE

## 2022-06-10 PROCEDURE — 3060F POS MICROALBUMINURIA REV: CPT | Mod: CPTII,S$GLB,, | Performed by: FAMILY MEDICINE

## 2022-06-10 PROCEDURE — 3079F DIAST BP 80-89 MM HG: CPT | Mod: CPTII,S$GLB,, | Performed by: FAMILY MEDICINE

## 2022-06-10 PROCEDURE — 3052F HG A1C>EQUAL 8.0%<EQUAL 9.0%: CPT | Mod: CPTII,S$GLB,, | Performed by: FAMILY MEDICINE

## 2022-06-10 PROCEDURE — 3079F PR MOST RECENT DIASTOLIC BLOOD PRESSURE 80-89 MM HG: ICD-10-PCS | Mod: CPTII,S$GLB,, | Performed by: FAMILY MEDICINE

## 2022-06-10 PROCEDURE — 1160F RVW MEDS BY RX/DR IN RCRD: CPT | Mod: CPTII,S$GLB,, | Performed by: FAMILY MEDICINE

## 2022-06-10 PROCEDURE — 3066F NEPHROPATHY DOC TX: CPT | Mod: CPTII,S$GLB,, | Performed by: FAMILY MEDICINE

## 2022-06-10 PROCEDURE — 3075F SYST BP GE 130 - 139MM HG: CPT | Mod: CPTII,S$GLB,, | Performed by: FAMILY MEDICINE

## 2022-06-10 PROCEDURE — 99214 PR OFFICE/OUTPT VISIT, EST, LEVL IV, 30-39 MIN: ICD-10-PCS | Mod: S$GLB,,, | Performed by: FAMILY MEDICINE

## 2022-06-10 PROCEDURE — 3066F PR DOCUMENTATION OF TREATMENT FOR NEPHROPATHY: ICD-10-PCS | Mod: CPTII,S$GLB,, | Performed by: FAMILY MEDICINE

## 2022-06-10 PROCEDURE — 1159F MED LIST DOCD IN RCRD: CPT | Mod: CPTII,S$GLB,, | Performed by: FAMILY MEDICINE

## 2022-06-10 PROCEDURE — 1160F PR REVIEW ALL MEDS BY PRESCRIBER/CLIN PHARMACIST DOCUMENTED: ICD-10-PCS | Mod: CPTII,S$GLB,, | Performed by: FAMILY MEDICINE

## 2022-06-10 PROCEDURE — 4010F PR ACE/ARB THEARPY RXD/TAKEN: ICD-10-PCS | Mod: CPTII,S$GLB,, | Performed by: FAMILY MEDICINE

## 2022-06-10 PROCEDURE — 99999 PR PBB SHADOW E&M-EST. PATIENT-LVL V: CPT | Mod: PBBFAC,,, | Performed by: FAMILY MEDICINE

## 2022-06-10 PROCEDURE — 99999 PR PBB SHADOW E&M-EST. PATIENT-LVL V: ICD-10-PCS | Mod: PBBFAC,,, | Performed by: FAMILY MEDICINE

## 2022-06-10 RX ORDER — SILDENAFIL 50 MG/1
50 TABLET, FILM COATED ORAL DAILY PRN
Qty: 30 TABLET | Refills: 5 | Status: SHIPPED | OUTPATIENT
Start: 2022-06-10 | End: 2023-09-19 | Stop reason: SDUPTHER

## 2022-06-10 RX ORDER — METOPROLOL SUCCINATE 100 MG/1
100 TABLET, EXTENDED RELEASE ORAL DAILY
COMMUNITY
Start: 2022-05-12 | End: 2022-10-10 | Stop reason: SDUPTHER

## 2022-06-10 RX ORDER — AMLODIPINE BESYLATE 10 MG/1
10 TABLET ORAL DAILY
COMMUNITY
Start: 2022-05-18 | End: 2022-10-10 | Stop reason: SDUPTHER

## 2022-06-10 RX ORDER — DAPAGLIFLOZIN 10 MG/1
10 TABLET, FILM COATED ORAL DAILY
COMMUNITY
Start: 2022-05-13 | End: 2022-08-15 | Stop reason: SDUPTHER

## 2022-06-13 ENCOUNTER — LAB VISIT (OUTPATIENT)
Dept: LAB | Facility: HOSPITAL | Age: 52
End: 2022-06-13
Attending: FAMILY MEDICINE
Payer: COMMERCIAL

## 2022-06-13 DIAGNOSIS — I10 ESSENTIAL HYPERTENSION, BENIGN: ICD-10-CM

## 2022-06-13 DIAGNOSIS — N52.1 TYPE 2 DIABETES MELLITUS WITH NEUROPATHY CAUSING ERECTILE DYSFUNCTION: ICD-10-CM

## 2022-06-13 DIAGNOSIS — E11.40 TYPE 2 DIABETES MELLITUS WITH NEUROPATHY CAUSING ERECTILE DYSFUNCTION: ICD-10-CM

## 2022-06-13 DIAGNOSIS — Z11.59 NEED FOR HEPATITIS C SCREENING TEST: ICD-10-CM

## 2022-06-13 DIAGNOSIS — E21.3 HYPERPARATHYROIDISM: ICD-10-CM

## 2022-06-13 DIAGNOSIS — N18.32 STAGE 3B CHRONIC KIDNEY DISEASE: ICD-10-CM

## 2022-06-13 LAB
25(OH)D3+25(OH)D2 SERPL-MCNC: 45 NG/ML (ref 30–96)
ALBUMIN SERPL BCP-MCNC: 4.1 G/DL (ref 3.5–5.2)
ALP SERPL-CCNC: 90 U/L (ref 55–135)
ALT SERPL W/O P-5'-P-CCNC: 33 U/L (ref 10–44)
ANION GAP SERPL CALC-SCNC: 11 MMOL/L (ref 8–16)
AST SERPL-CCNC: 28 U/L (ref 10–40)
BASOPHILS # BLD AUTO: 0.04 K/UL (ref 0–0.2)
BASOPHILS NFR BLD: 0.4 % (ref 0–1.9)
BILIRUB SERPL-MCNC: 1 MG/DL (ref 0.1–1)
BUN SERPL-MCNC: 34 MG/DL (ref 6–20)
CALCIUM SERPL-MCNC: 9.9 MG/DL (ref 8.7–10.5)
CHLORIDE SERPL-SCNC: 110 MMOL/L (ref 95–110)
CHOLEST SERPL-MCNC: 131 MG/DL (ref 120–199)
CHOLEST/HDLC SERPL: 5.2 {RATIO} (ref 2–5)
CO2 SERPL-SCNC: 20 MMOL/L (ref 23–29)
CREAT SERPL-MCNC: 2.1 MG/DL (ref 0.5–1.4)
DIFFERENTIAL METHOD: ABNORMAL
EOSINOPHIL # BLD AUTO: 0.2 K/UL (ref 0–0.5)
EOSINOPHIL NFR BLD: 1.8 % (ref 0–8)
ERYTHROCYTE [DISTWIDTH] IN BLOOD BY AUTOMATED COUNT: 14 % (ref 11.5–14.5)
EST. GFR  (AFRICAN AMERICAN): 40 ML/MIN/1.73 M^2
EST. GFR  (NON AFRICAN AMERICAN): 35 ML/MIN/1.73 M^2
ESTIMATED AVG GLUCOSE: 183 MG/DL (ref 68–131)
GLUCOSE SERPL-MCNC: 159 MG/DL (ref 70–110)
HBA1C MFR BLD: 8 % (ref 4–5.6)
HCT VFR BLD AUTO: 41.5 % (ref 40–54)
HDLC SERPL-MCNC: 25 MG/DL (ref 40–75)
HDLC SERPL: 19.1 % (ref 20–50)
HGB BLD-MCNC: 13.6 G/DL (ref 14–18)
IMM GRANULOCYTES # BLD AUTO: 0.02 K/UL (ref 0–0.04)
IMM GRANULOCYTES NFR BLD AUTO: 0.2 % (ref 0–0.5)
LDLC SERPL CALC-MCNC: ABNORMAL MG/DL (ref 63–159)
LYMPHOCYTES # BLD AUTO: 3.2 K/UL (ref 1–4.8)
LYMPHOCYTES NFR BLD: 31.4 % (ref 18–48)
MCH RBC QN AUTO: 28.5 PG (ref 27–31)
MCHC RBC AUTO-ENTMCNC: 32.8 G/DL (ref 32–36)
MCV RBC AUTO: 87 FL (ref 82–98)
MONOCYTES # BLD AUTO: 0.7 K/UL (ref 0.3–1)
MONOCYTES NFR BLD: 6.8 % (ref 4–15)
NEUTROPHILS # BLD AUTO: 6.1 K/UL (ref 1.8–7.7)
NEUTROPHILS NFR BLD: 59.4 % (ref 38–73)
NONHDLC SERPL-MCNC: 106 MG/DL
NRBC BLD-RTO: 0 /100 WBC
PLATELET # BLD AUTO: 207 K/UL (ref 150–450)
PMV BLD AUTO: 11.2 FL (ref 9.2–12.9)
POTASSIUM SERPL-SCNC: 4 MMOL/L (ref 3.5–5.1)
PROT SERPL-MCNC: 8.4 G/DL (ref 6–8.4)
PTH-INTACT SERPL-MCNC: 73.3 PG/ML (ref 9–77)
RBC # BLD AUTO: 4.77 M/UL (ref 4.6–6.2)
SODIUM SERPL-SCNC: 141 MMOL/L (ref 136–145)
TRIGL SERPL-MCNC: 476 MG/DL (ref 30–150)
WBC # BLD AUTO: 10.31 K/UL (ref 3.9–12.7)

## 2022-06-13 PROCEDURE — 85025 COMPLETE CBC W/AUTO DIFF WBC: CPT | Performed by: FAMILY MEDICINE

## 2022-06-13 PROCEDURE — 86803 HEPATITIS C AB TEST: CPT | Performed by: FAMILY MEDICINE

## 2022-06-13 PROCEDURE — 80061 LIPID PANEL: CPT | Performed by: FAMILY MEDICINE

## 2022-06-13 PROCEDURE — 36415 COLL VENOUS BLD VENIPUNCTURE: CPT | Mod: PN | Performed by: FAMILY MEDICINE

## 2022-06-13 PROCEDURE — 80053 COMPREHEN METABOLIC PANEL: CPT | Performed by: FAMILY MEDICINE

## 2022-06-13 PROCEDURE — 83970 ASSAY OF PARATHORMONE: CPT | Performed by: FAMILY MEDICINE

## 2022-06-13 PROCEDURE — 83036 HEMOGLOBIN GLYCOSYLATED A1C: CPT | Performed by: FAMILY MEDICINE

## 2022-06-13 PROCEDURE — 82306 VITAMIN D 25 HYDROXY: CPT | Performed by: FAMILY MEDICINE

## 2022-06-14 LAB — HCV AB SERPL QL IA: NEGATIVE

## 2022-06-26 NOTE — PROGRESS NOTES
Subjective:       Patient ID: Nicholas Nugent is a 52 y.o. male.    Chief Complaint: Diabetes, Hypertension, Hyperlipidemia, and Chronic Kidney Disease    Diabetes  He presents for his follow-up diabetic visit. He has type 2 diabetes mellitus. Pertinent negatives for hypoglycemia include no confusion, dizziness, headaches, pallor, seizures, speech difficulty, sweats or tremors. Associated symptoms include fatigue. Pertinent negatives for diabetes include no blurred vision, no chest pain, no polydipsia, no polyphagia, no polyuria, no visual change and no weakness. Risk factors for coronary artery disease include dyslipidemia, diabetes mellitus, male sex and obesity. Current diabetic treatments: Farxiga, Ozempic, Glipizide. Frequency home blood tests: not checking regularly. Blood glucose monitoring compliance is poor.   Hypertension  This is a chronic problem. Pertinent negatives include no blurred vision, chest pain, headaches, shortness of breath or sweats. Risk factors for coronary artery disease include diabetes mellitus, male gender, obesity and dyslipidemia. The current treatment provides moderate improvement. Hypertensive end-organ damage includes kidney disease.   Hyperlipidemia  This is a chronic problem. The problem is uncontrolled. Pertinent negatives include no chest pain or shortness of breath. Current antihyperlipidemic treatment includes statins. The current treatment provides moderate improvement of lipids. Risk factors for coronary artery disease include diabetes mellitus, hypertension, male sex, obesity and dyslipidemia.     Review of Systems   Constitutional: Positive for fatigue. Negative for unexpected weight change.   HENT: Positive for tinnitus (left). Negative for ear pain and sore throat.    Eyes: Negative for blurred vision and visual disturbance.   Respiratory: Negative for shortness of breath.    Cardiovascular: Negative for chest pain.   Gastrointestinal: Negative for abdominal pain and  blood in stool.   Endocrine: Negative for cold intolerance, heat intolerance, polydipsia, polyphagia and polyuria.   Genitourinary: Positive for erectile dysfunction. Negative for dysuria and frequency.   Integumentary:  Negative for pallor and rash.   Neurological: Negative for dizziness, tremors, seizures, speech difficulty, weakness, numbness and headaches.   Hematological: Negative for adenopathy.   Psychiatric/Behavioral: Negative for confusion and suicidal ideas.         Objective:      Physical Exam  Vitals reviewed.   Constitutional:       Appearance: He is well-developed. He is not diaphoretic.   HENT:      Head: Normocephalic.      Right Ear: External ear normal.      Left Ear: External ear normal.      Nose: Nose normal.      Mouth/Throat:      Pharynx: No oropharyngeal exudate.   Neck:      Trachea: No tracheal deviation.   Cardiovascular:      Rate and Rhythm: Normal rate and regular rhythm.      Heart sounds: Normal heart sounds.   Pulmonary:      Effort: Pulmonary effort is normal.      Breath sounds: Normal breath sounds. No wheezing or rales.   Abdominal:      General: Bowel sounds are normal.      Palpations: Abdomen is soft. Abdomen is not rigid. There is no mass.      Tenderness: There is no abdominal tenderness. There is no guarding or rebound.   Musculoskeletal:      Cervical back: Normal range of motion and neck supple.   Lymphadenopathy:      Cervical: No cervical adenopathy.   Neurological:      Mental Status: He is oriented to person, place, and time.      Sensory: No sensory deficit.      Motor: No atrophy.      Gait: Gait normal.      Deep Tendon Reflexes:      Reflex Scores:       Patellar reflexes are 2+ on the right side and 2+ on the left side.        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 - Primary    Relevant Orders    CBC auto differential (Completed)    Comprehensive  metabolic panel (Completed)    Hemoglobin A1c (Completed)    MICROALBUMIN / CREATININE RATIO URINE (Completed)    Comprehensive Metabolic Panel    Hemoglobin A1C    CKD (chronic kidney disease) stage 3, GFR 30-59 ml/min    Relevant Orders    CBC auto differential (Completed)    Comprehensive metabolic panel (Completed)    MICROALBUMIN / CREATININE RATIO URINE (Completed)    PTH, intact (Completed)    Vitamin D (Completed)      Other Visit Diagnoses     Essential hypertension, benign        Relevant Orders    CBC auto differential (Completed)    Comprehensive metabolic panel (Completed)    Lipid Panel (Completed)    MICROALBUMIN / CREATININE RATIO URINE (Completed)    Hyperparathyroidism        Relevant Orders    CBC auto differential (Completed)    Comprehensive metabolic panel (Completed)    PTH, intact (Completed)    Vitamin D (Completed)    Need for hepatitis C screening test        Relevant Orders    Hepatitis C Antibody (Completed)    Screening for diabetic retinopathy        Relevant Orders    Ambulatory referral/consult to Optometry    Erectile dysfunction, unspecified erectile dysfunction type        Relevant Medications    sildenafiL (VIAGRA) 50 MG tablet          Plan:       Nicholas was seen today for diabetes, hypertension, hyperlipidemia and chronic kidney disease.    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    Essential hypertension, benign  -     CBC auto differential; Future  -     Comprehensive metabolic panel; Future  -     Lipid Panel; Future  -     MICROALBUMIN / CREATININE RATIO URINE; Future  BP not at  goal.  Importance of taking all medications as prescribed explained    Stage 3b chronic kidney disease  -     CBC auto differential; Future  -     Comprehensive metabolic panel; Future  -     MICROALBUMIN / CREATININE RATIO URINE; Future  -     PTH, intact; Future  -     Vitamin D; Future  Check renal labs    Hyperparathyroidism  -     CBC auto differential; Future  -     Comprehensive metabolic panel; Future  -     PTH, intact; Future  -     Vitamin D; Future  Check PTH    Need for hepatitis C screening test  -     Hepatitis C Antibody; Future  Screen for HCV as per USPSTF guidelines    Screening for diabetic retinopathy  -     Ambulatory referral/consult to Optometry; Future    Erectile dysfunction, unspecified erectile dysfunction type  -     sildenafiL (VIAGRA) 50 MG tablet; Take 1 tablet (50 mg total) by mouth daily as needed for Erectile Dysfunction.

## 2022-07-08 ENCOUNTER — PATIENT MESSAGE (OUTPATIENT)
Dept: PRIMARY CARE CLINIC | Facility: CLINIC | Age: 52
End: 2022-07-08
Payer: COMMERCIAL

## 2022-07-08 ENCOUNTER — TELEPHONE (OUTPATIENT)
Dept: FAMILY MEDICINE | Facility: CLINIC | Age: 52
End: 2022-07-08
Payer: COMMERCIAL

## 2022-07-08 NOTE — TELEPHONE ENCOUNTER
Attempted to call patient through  [facility] [phone] and was unable to leave a voicemail. Attempted to call patient's alternate contact, Esther, and was able to leave voicemail to call back office.

## 2022-07-08 NOTE — TELEPHONE ENCOUNTER
----- Message from Gurwinder Olmedo Jr., MD sent at 7/5/2022 10:22 PM CDT -----  Patient's diabetes and lipids not controlled. Please schedule virtual visit with me to discuss treatment options

## 2022-07-08 NOTE — TELEPHONE ENCOUNTER
Spoke with patient's wife over the phone, she is requesting to speak with MD, was able to set up a virtual appointment

## 2022-07-13 DIAGNOSIS — E11.9 TYPE 2 DIABETES MELLITUS WITHOUT COMPLICATION, UNSPECIFIED WHETHER LONG TERM INSULIN USE: ICD-10-CM

## 2022-07-18 ENCOUNTER — PATIENT MESSAGE (OUTPATIENT)
Dept: ADMINISTRATIVE | Facility: HOSPITAL | Age: 52
End: 2022-07-18
Payer: COMMERCIAL

## 2022-07-20 ENCOUNTER — OFFICE VISIT (OUTPATIENT)
Dept: FAMILY MEDICINE | Facility: CLINIC | Age: 52
End: 2022-07-20
Payer: COMMERCIAL

## 2022-07-20 DIAGNOSIS — N18.32 TYPE 2 DIABETES MELLITUS WITH STAGE 3B CHRONIC KIDNEY DISEASE, WITHOUT LONG-TERM CURRENT USE OF INSULIN: Primary | ICD-10-CM

## 2022-07-20 DIAGNOSIS — E11.22 TYPE 2 DIABETES MELLITUS WITH STAGE 3B CHRONIC KIDNEY DISEASE, WITHOUT LONG-TERM CURRENT USE OF INSULIN: Primary | ICD-10-CM

## 2022-07-20 DIAGNOSIS — I10 BENIGN HYPERTENSION: ICD-10-CM

## 2022-07-20 DIAGNOSIS — E78.2 MIXED DYSLIPIDEMIA: ICD-10-CM

## 2022-07-20 DIAGNOSIS — E11.40 TYPE 2 DIABETES MELLITUS WITH DIABETIC NEUROPATHY, WITHOUT LONG-TERM CURRENT USE OF INSULIN: ICD-10-CM

## 2022-07-20 DIAGNOSIS — N18.32 STAGE 3B CHRONIC KIDNEY DISEASE: ICD-10-CM

## 2022-07-20 PROCEDURE — 3060F POS MICROALBUMINURIA REV: CPT | Mod: CPTII,95,, | Performed by: FAMILY MEDICINE

## 2022-07-20 PROCEDURE — 3066F PR DOCUMENTATION OF TREATMENT FOR NEPHROPATHY: ICD-10-PCS | Mod: CPTII,95,, | Performed by: FAMILY MEDICINE

## 2022-07-20 PROCEDURE — 3052F PR MOST RECENT HEMOGLOBIN A1C LEVEL 8.0 - < 9.0%: ICD-10-PCS | Mod: CPTII,95,, | Performed by: FAMILY MEDICINE

## 2022-07-20 PROCEDURE — 3060F PR POS MICROALBUMINURIA RESULT DOCUMENTED/REVIEW: ICD-10-PCS | Mod: CPTII,95,, | Performed by: FAMILY MEDICINE

## 2022-07-20 PROCEDURE — 3052F HG A1C>EQUAL 8.0%<EQUAL 9.0%: CPT | Mod: CPTII,95,, | Performed by: FAMILY MEDICINE

## 2022-07-20 PROCEDURE — 3066F NEPHROPATHY DOC TX: CPT | Mod: CPTII,95,, | Performed by: FAMILY MEDICINE

## 2022-07-20 PROCEDURE — 99214 OFFICE O/P EST MOD 30 MIN: CPT | Mod: 95,,, | Performed by: FAMILY MEDICINE

## 2022-07-20 PROCEDURE — 99214 PR OFFICE/OUTPT VISIT, EST, LEVL IV, 30-39 MIN: ICD-10-PCS | Mod: 95,,, | Performed by: FAMILY MEDICINE

## 2022-07-20 PROCEDURE — 4010F PR ACE/ARB THEARPY RXD/TAKEN: ICD-10-PCS | Mod: CPTII,95,, | Performed by: FAMILY MEDICINE

## 2022-07-20 PROCEDURE — 4010F ACE/ARB THERAPY RXD/TAKEN: CPT | Mod: CPTII,95,, | Performed by: FAMILY MEDICINE

## 2022-07-20 RX ORDER — OMEGA-3-ACID ETHYL ESTERS 1 G/1
2 CAPSULE, LIQUID FILLED ORAL 2 TIMES DAILY
Qty: 360 CAPSULE | Refills: 1 | Status: SHIPPED | OUTPATIENT
Start: 2022-07-20 | End: 2022-10-10 | Stop reason: SDUPTHER

## 2022-07-20 RX ORDER — SEMAGLUTIDE 1.34 MG/ML
1 INJECTION, SOLUTION SUBCUTANEOUS
Qty: 1 PEN | Refills: 0 | Status: SHIPPED | OUTPATIENT
Start: 2022-07-20 | End: 2022-08-15 | Stop reason: SDUPTHER

## 2022-08-08 ENCOUNTER — PATIENT OUTREACH (OUTPATIENT)
Dept: ADMINISTRATIVE | Facility: HOSPITAL | Age: 52
End: 2022-08-08
Payer: COMMERCIAL

## 2022-08-15 ENCOUNTER — OFFICE VISIT (OUTPATIENT)
Dept: ENDOCRINOLOGY | Facility: CLINIC | Age: 52
End: 2022-08-15
Payer: COMMERCIAL

## 2022-08-15 VITALS
WEIGHT: 195.81 LBS | HEART RATE: 77 BPM | SYSTOLIC BLOOD PRESSURE: 139 MMHG | DIASTOLIC BLOOD PRESSURE: 90 MMHG | BODY MASS INDEX: 30.67 KG/M2 | TEMPERATURE: 98 F

## 2022-08-15 DIAGNOSIS — N18.32 STAGE 3B CHRONIC KIDNEY DISEASE: ICD-10-CM

## 2022-08-15 DIAGNOSIS — N52.1 TYPE 2 DIABETES MELLITUS WITH NEUROPATHY CAUSING ERECTILE DYSFUNCTION: ICD-10-CM

## 2022-08-15 DIAGNOSIS — E11.40 TYPE 2 DIABETES MELLITUS WITH NEUROPATHY CAUSING ERECTILE DYSFUNCTION: ICD-10-CM

## 2022-08-15 DIAGNOSIS — E11.22 TYPE 2 DIABETES MELLITUS WITH STAGE 3B CHRONIC KIDNEY DISEASE, WITHOUT LONG-TERM CURRENT USE OF INSULIN: ICD-10-CM

## 2022-08-15 DIAGNOSIS — N18.32 TYPE 2 DIABETES MELLITUS WITH STAGE 3B CHRONIC KIDNEY DISEASE, WITHOUT LONG-TERM CURRENT USE OF INSULIN: ICD-10-CM

## 2022-08-15 DIAGNOSIS — E66.09 CLASS 1 OBESITY DUE TO EXCESS CALORIES WITH SERIOUS COMORBIDITY AND BODY MASS INDEX (BMI) OF 30.0 TO 30.9 IN ADULT: ICD-10-CM

## 2022-08-15 DIAGNOSIS — E11.65 TYPE 2 DIABETES MELLITUS WITH HYPERGLYCEMIA, WITHOUT LONG-TERM CURRENT USE OF INSULIN: Primary | ICD-10-CM

## 2022-08-15 DIAGNOSIS — N25.81 SECONDARY HYPERPARATHYROIDISM OF RENAL ORIGIN: ICD-10-CM

## 2022-08-15 PROBLEM — E66.811 CLASS 1 OBESITY DUE TO EXCESS CALORIES WITH SERIOUS COMORBIDITY AND BODY MASS INDEX (BMI) OF 30.0 TO 30.9 IN ADULT: Status: ACTIVE | Noted: 2022-08-15

## 2022-08-15 LAB — GLUCOSE SERPL-MCNC: 169 MG/DL (ref 70–110)

## 2022-08-15 PROCEDURE — 3008F BODY MASS INDEX DOCD: CPT | Mod: CPTII,S$GLB,, | Performed by: HOSPITALIST

## 2022-08-15 PROCEDURE — 3080F PR MOST RECENT DIASTOLIC BLOOD PRESSURE >= 90 MM HG: ICD-10-PCS | Mod: CPTII,S$GLB,, | Performed by: HOSPITALIST

## 2022-08-15 PROCEDURE — 1159F MED LIST DOCD IN RCRD: CPT | Mod: CPTII,S$GLB,, | Performed by: HOSPITALIST

## 2022-08-15 PROCEDURE — 82962 POCT GLUCOSE, HAND-HELD DEVICE: ICD-10-PCS | Mod: S$GLB,,, | Performed by: HOSPITALIST

## 2022-08-15 PROCEDURE — 99999 PR PBB SHADOW E&M-EST. PATIENT-LVL IV: ICD-10-PCS | Mod: PBBFAC,,, | Performed by: HOSPITALIST

## 2022-08-15 PROCEDURE — 3008F PR BODY MASS INDEX (BMI) DOCUMENTED: ICD-10-PCS | Mod: CPTII,S$GLB,, | Performed by: HOSPITALIST

## 2022-08-15 PROCEDURE — 3052F PR MOST RECENT HEMOGLOBIN A1C LEVEL 8.0 - < 9.0%: ICD-10-PCS | Mod: CPTII,S$GLB,, | Performed by: HOSPITALIST

## 2022-08-15 PROCEDURE — 3066F NEPHROPATHY DOC TX: CPT | Mod: CPTII,S$GLB,, | Performed by: HOSPITALIST

## 2022-08-15 PROCEDURE — 3060F POS MICROALBUMINURIA REV: CPT | Mod: CPTII,S$GLB,, | Performed by: HOSPITALIST

## 2022-08-15 PROCEDURE — 4010F PR ACE/ARB THEARPY RXD/TAKEN: ICD-10-PCS | Mod: CPTII,S$GLB,, | Performed by: HOSPITALIST

## 2022-08-15 PROCEDURE — 99999 PR PBB SHADOW E&M-EST. PATIENT-LVL IV: CPT | Mod: PBBFAC,,, | Performed by: HOSPITALIST

## 2022-08-15 PROCEDURE — 82962 GLUCOSE BLOOD TEST: CPT | Mod: S$GLB,,, | Performed by: HOSPITALIST

## 2022-08-15 PROCEDURE — 4010F ACE/ARB THERAPY RXD/TAKEN: CPT | Mod: CPTII,S$GLB,, | Performed by: HOSPITALIST

## 2022-08-15 PROCEDURE — 3060F PR POS MICROALBUMINURIA RESULT DOCUMENTED/REVIEW: ICD-10-PCS | Mod: CPTII,S$GLB,, | Performed by: HOSPITALIST

## 2022-08-15 PROCEDURE — 1160F RVW MEDS BY RX/DR IN RCRD: CPT | Mod: CPTII,S$GLB,, | Performed by: HOSPITALIST

## 2022-08-15 PROCEDURE — 99204 OFFICE O/P NEW MOD 45 MIN: CPT | Mod: S$GLB,,, | Performed by: HOSPITALIST

## 2022-08-15 PROCEDURE — 3080F DIAST BP >= 90 MM HG: CPT | Mod: CPTII,S$GLB,, | Performed by: HOSPITALIST

## 2022-08-15 PROCEDURE — 3075F PR MOST RECENT SYSTOLIC BLOOD PRESS GE 130-139MM HG: ICD-10-PCS | Mod: CPTII,S$GLB,, | Performed by: HOSPITALIST

## 2022-08-15 PROCEDURE — 99204 PR OFFICE/OUTPT VISIT, NEW, LEVL IV, 45-59 MIN: ICD-10-PCS | Mod: S$GLB,,, | Performed by: HOSPITALIST

## 2022-08-15 PROCEDURE — 3066F PR DOCUMENTATION OF TREATMENT FOR NEPHROPATHY: ICD-10-PCS | Mod: CPTII,S$GLB,, | Performed by: HOSPITALIST

## 2022-08-15 PROCEDURE — 3052F HG A1C>EQUAL 8.0%<EQUAL 9.0%: CPT | Mod: CPTII,S$GLB,, | Performed by: HOSPITALIST

## 2022-08-15 PROCEDURE — 1160F PR REVIEW ALL MEDS BY PRESCRIBER/CLIN PHARMACIST DOCUMENTED: ICD-10-PCS | Mod: CPTII,S$GLB,, | Performed by: HOSPITALIST

## 2022-08-15 PROCEDURE — 3075F SYST BP GE 130 - 139MM HG: CPT | Mod: CPTII,S$GLB,, | Performed by: HOSPITALIST

## 2022-08-15 PROCEDURE — 1159F PR MEDICATION LIST DOCUMENTED IN MEDICAL RECORD: ICD-10-PCS | Mod: CPTII,S$GLB,, | Performed by: HOSPITALIST

## 2022-08-15 RX ORDER — SEMAGLUTIDE 1.34 MG/ML
1 INJECTION, SOLUTION SUBCUTANEOUS
Qty: 1 PEN | Refills: 4 | Status: SHIPPED | OUTPATIENT
Start: 2022-08-15 | End: 2023-01-17

## 2022-08-15 RX ORDER — DAPAGLIFLOZIN 10 MG/1
10 TABLET, FILM COATED ORAL DAILY
Qty: 90 TABLET | Refills: 1 | Status: SHIPPED | OUTPATIENT
Start: 2022-08-15 | End: 2023-09-19 | Stop reason: SDUPTHER

## 2022-08-15 NOTE — ASSESSMENT & PLAN NOTE
- patient with longstanding history of CKD stage IIIB  - hyperparathyroidism noted on lab work, secondary to kidney function  - much better improvement  - no hypercalcemia  - does not fit surgical criteria for parathyroidectomy

## 2022-08-15 NOTE — ASSESSMENT & PLAN NOTE
- Renal function reviewed on lab work today, stable   - Renally Adjust diabetes medication, avoid hypoglycemia  - continue routine monitoring  - nephro

## 2022-08-15 NOTE — PATIENT INSTRUCTIONS
(Thank you for completing this visit with me and PLEASE follow up with me regularly for continue refills of your diabetes medications)  ----------------------------------------------------------    Regiment:     __ Farxiga 10 mg daily   __ Ozempic 1mg once a week injection    Please call [facility]: [phone], about your medication that I just sent        Goal blood sugar in the morning, before breakfast:   Glucose goal AFTER MEALS:    2 Hour after: less than 140  Before going to bed: 100-140  Do not go to bed with glucose less than 100  Have a small snack if glucose is lower than 100      Please check glucose daily  (before breakfast).   You can keep track of the glucose with Glucose logs or any papers  Please filled out and bring back to next office visit for me to review  Document any (LOW BLOOD GLUCOSE) hypoglycemia  episode with date and time for me to review      Please make sure to get your dilated eyes examine once a year with your eye doctor.  Monitor your feet for any cuts or skin breakdown regularly, schedule a visit with your foot doctor/podiatrist yearly if you see one.      We will plan an in-clinic visit in 2 months, with labs prior to that appointment.    Contact information:  Gordon Vasquez M.D  [facility] [address] [facility], [address]  [place] [address]    Office:  [phone]  Fax:  [phone]     ----------------------------------------------------------

## 2022-08-15 NOTE — ASSESSMENT & PLAN NOTE
- Diabetes is not at goal, given most current A1C, Goal A1C for patient is 7%  - Limit data/lack of glucose log, making adjustment of diabetes regiment very difficult  - Complicated by hyperglycemia and dietary indiscretion  - Diabetic supplies/medications were reviewed this visit to ensure continue steady supplies  - Advised patient to get routine feet care, routine eye exam  - Diabetes goal including glucose glucose and A1C goals discussed  - Discussed proper injection technique: Including rotation of injection sites    Plan  - Adjustment made:  Recent adjustment by PCP, this visit was spent discussing dietary modification, decrease portion size control, decreasing carbohydrates intake  - continue Ozempic 1 mg once a week, continue Farxiga 10 mg daily  - Advised pt to check glucoses regularly, asked to filled glucose log and bring back for review at next office visit  - Clear written instruction given on AVS. Follow up as scheduled

## 2022-08-15 NOTE — PROGRESS NOTES
Subjective:      Patient ID: Nicholas Nugent is a 52 y.o. male presented to [facility] on 8/15/2022.  Chief Complaint:  Diabetes      History of Present Illness: Nicholas Nugent is a 52 y.o. male here for diabetes   Other significant past medical history:  CKD stage 3B, hypertension     With regards to Diabetes Mellitus Type 2  - Known diabetic complications: nephropathy/CKD  - Diagnosed w/ DM: in early 2000s  - Diabetes Education: No      Interval history:  Patient declined translating service.  Was able to communicate in English without any issue.   Increase in A1C, sent by PCP, not watching diet leading to increase in A1C 8.0. Eliminating junk food, for 2 weeks  Ozempic increased in 7/2022   In clinic glucose check: 169    Current reported meds:               Farxiga 10 mg daily   Ozempic 1 mg weekly  Rotation of injection site: no  Previous meds tried:              glipizide  Home glucose checks: checks 1x a day, Logs reviewed/Unavailable: oral recall:  150s  - No hypoglycemia   Diet/Exercise:   - Eating 3x meals per day              - Drink:  water  Weight trend: increasing steadily  Diabetes Related Hospitalization:  No  Hx of pancreatitis: No, denies  Family history of diabetes: Yes  Occupation: working    Eye exam current (within one year): no, DR: no, unknown  Reports cuts or ulcers on feet:   Denies   Statin: Taking  ACE/ARB: Not taking    Diabetes Management Status: Reviewed     A1C Trend  Lab Results   Component Value Date    HGBA1C 8.0 (H) 06/13/2022    HGBA1C 7.6 (H) 01/04/2022    HGBA1C 5.9 (H) 04/13/2021    HGBA1C 5.7 (H) 05/18/2020       Lab Results   Component Value Date    MICALBCREAT 193.8 (H) 06/13/2022     No results found for: LEBVGVJU84  No results found for: TTGIGA    No results found for: CPEPTIDE, GLUTAMICACID, ISLETCELLANT, FRUCTOSAMINE     Screening or Prevention Patient's value Goal Complete/Controlled?   Lipid profile : 06/13/2022 Annually Yes   Dilated retinal exam :  10/21/2020 Annually Yes   Foot exam   : 08/23/2019 Annually No       Secondary hyperparathyroidism  - History of CKD stage IIIB  - no hypercalcemia    Reviewed past surgical, medical, family, social history and updated as appropriate.    Review of Systems: see HPI above    Objective:   BP (!) 139/90 (BP Location: Right arm)   Pulse 77   Temp 97.7 °F (36.5 °C) (Oral)   Wt 88.8 kg (195 lb 12.8 oz)   BMI 30.67 kg/m²     Body mass index is 30.67 kg/m².  Vital signs reviewed    Physical Exam  Vitals and nursing note reviewed.   Constitutional:       Appearance: Normal appearance. He is well-developed. He is obese. He is not ill-appearing.   Neck:      Thyroid: No thyromegaly.   Pulmonary:      Effort: Pulmonary effort is normal. No respiratory distress.   Musculoskeletal:         General: Normal range of motion.      Cervical back: Normal range of motion.   Neurological:      General: No focal deficit present.      Mental Status: He is alert. Mental status is at baseline.   Psychiatric:         Mood and Affect: Mood normal.         Behavior: Behavior normal.         Lab Reviewed:   Lab Results   Component Value Date    HGBA1C 8.0 (H) 06/13/2022       Lab Results   Component Value Date    CHOL 131 06/13/2022    HDL 25 (L) 06/13/2022    LDLCALC Invalid, Trig>400.0 06/13/2022    TRIG 476 (H) 06/13/2022    CHOLHDL 19.1 (L) 06/13/2022       Lab Results   Component Value Date     06/13/2022    K 4.0 06/13/2022     06/13/2022    CO2 20 (L) 06/13/2022     (H) 06/13/2022    BUN 34 (H) 06/13/2022    CREATININE 2.1 (H) 06/13/2022    CALCIUM 9.9 06/13/2022    PROT 8.4 06/13/2022    ALBUMIN 4.1 06/13/2022    BILITOT 1.0 06/13/2022    ALKPHOS 90 06/13/2022    AST 28 06/13/2022    ALT 33 06/13/2022    ANIONGAP 11 06/13/2022    ESTGFRAFRICA 40 (A) 06/13/2022    EGFRNONAA 35 (A) 06/13/2022    TSH 2.495 08/23/2019        Lab Results   Component Value Date    PTH 73.3 06/13/2022    PTH 49 03/03/2020    .2 (H)  08/23/2019    JCMNZMGC89YY 45 06/13/2022    CALCIUM 9.9 06/13/2022    CALCIUM 10.2 08/06/2020    CALCIUM 9.8 05/18/2020    ALKPHOS 90 06/13/2022    ALKPHOS 78 08/06/2020    ALKPHOS 78 05/18/2020    TSH 2.495 08/23/2019       Assessment     1. Type 2 diabetes mellitus with hyperglycemia, without long-term current use of insulin  FARXIGA 10 mg tablet    semaglutide (OZEMPIC) 1 mg/dose (4 mg/3 mL)    Hemoglobin A1C    Basic Metabolic Panel    POCT Glucose, Hand-Held Device   2. Type 2 diabetes mellitus with stage 3b chronic kidney disease, without long-term current use of insulin  Ambulatory referral/consult to Endocrinology    FARXIGA 10 mg tablet    semaglutide (OZEMPIC) 1 mg/dose (4 mg/3 mL)   3. 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 Secondary hyperparathyroidism of renal origin     5. Stage 3b chronic kidney disease     6. Class 1 obesity due to excess calories with serious comorbidity and body mass index (BMI) of 30.0 to 30.9 in adult          Plan     Type 2 diabetes mellitus with hyperglycemia, without long-term current use of insulin  - Diabetes is not at goal, given most current A1C, Goal A1C for patient is 7%  - Limit data/lack of glucose log, making adjustment of diabetes regiment very difficult  - Complicated by hyperglycemia and dietary indiscretion  - Diabetic supplies/medications were reviewed this visit to ensure continue steady supplies  - Advised patient to get routine feet care, routine eye exam  - Diabetes goal including glucose glucose and A1C goals discussed  - Discussed proper injection technique: Including rotation of injection sites    Plan  - Adjustment made:  Recent adjustment by PCP, this visit was spent discussing dietary modification, decrease portion size control, decreasing carbohydrates intake  - continue Ozempic 1 mg once a week, continue Farxiga 10 mg daily  - Advised pt to check glucoses regularly, asked to filled glucose log and bring back for  review at next office visit  - Clear written instruction given on AVS. 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  - was for better diabetes control  - continue Viagra per PCP    Secondary hyperparathyroidism of renal origin  - patient with longstanding history of CKD stage IIIB  - hyperparathyroidism noted on lab work, secondary to kidney function  - much better improvement  - no hypercalcemia  - does not fit surgical criteria for parathyroidectomy    Stage 3b chronic kidney disease  - Renal function reviewed on lab work today, stable   - Renally Adjust diabetes medication, avoid hypoglycemia  - continue routine monitoring  - nephro    Class 1 obesity due to excess calories with serious comorbidity and body mass index (BMI) of 30.0 to 30.9 in adult  - Body mass index is 30.67 kg/m².  - dietary discussion as above  - continue to monitor weight      Advised patient to follow up with PCP for routine health maintenance care.   RTC in 3 months,      Gordon Vasquez M.D.  Endocrinology  [facility]  8/15/2022      Disclaimer: This note has been generated in part with the use of voice-recognition software. There may be typographical errors that have been missed during proof-reading.

## 2022-09-06 ENCOUNTER — OFFICE VISIT (OUTPATIENT)
Dept: OPTOMETRY | Facility: CLINIC | Age: 52
End: 2022-09-06
Payer: COMMERCIAL

## 2022-09-06 DIAGNOSIS — E11.9 DIABETES MELLITUS TYPE 2 WITHOUT RETINOPATHY: Primary | ICD-10-CM

## 2022-09-06 DIAGNOSIS — Z13.5 SCREENING FOR DIABETIC RETINOPATHY: ICD-10-CM

## 2022-09-06 PROCEDURE — 1159F MED LIST DOCD IN RCRD: CPT | Mod: CPTII,S$GLB,, | Performed by: OPTOMETRIST

## 2022-09-06 PROCEDURE — 3066F NEPHROPATHY DOC TX: CPT | Mod: CPTII,S$GLB,, | Performed by: OPTOMETRIST

## 2022-09-06 PROCEDURE — 99999 PR PBB SHADOW E&M-EST. PATIENT-LVL III: CPT | Mod: PBBFAC,,, | Performed by: OPTOMETRIST

## 2022-09-06 PROCEDURE — 2023F DILAT RTA XM W/O RTNOPTHY: CPT | Mod: CPTII,S$GLB,, | Performed by: OPTOMETRIST

## 2022-09-06 PROCEDURE — 4010F ACE/ARB THERAPY RXD/TAKEN: CPT | Mod: CPTII,S$GLB,, | Performed by: OPTOMETRIST

## 2022-09-06 PROCEDURE — 3060F PR POS MICROALBUMINURIA RESULT DOCUMENTED/REVIEW: ICD-10-PCS | Mod: CPTII,S$GLB,, | Performed by: OPTOMETRIST

## 2022-09-06 PROCEDURE — 1159F PR MEDICATION LIST DOCUMENTED IN MEDICAL RECORD: ICD-10-PCS | Mod: CPTII,S$GLB,, | Performed by: OPTOMETRIST

## 2022-09-06 PROCEDURE — 3066F PR DOCUMENTATION OF TREATMENT FOR NEPHROPATHY: ICD-10-PCS | Mod: CPTII,S$GLB,, | Performed by: OPTOMETRIST

## 2022-09-06 PROCEDURE — 3060F POS MICROALBUMINURIA REV: CPT | Mod: CPTII,S$GLB,, | Performed by: OPTOMETRIST

## 2022-09-06 PROCEDURE — 2023F PR DILATED RETINAL EXAM W/O EVID OF RETINOPATHY: ICD-10-PCS | Mod: CPTII,S$GLB,, | Performed by: OPTOMETRIST

## 2022-09-06 PROCEDURE — 99999 PR PBB SHADOW E&M-EST. PATIENT-LVL III: ICD-10-PCS | Mod: PBBFAC,,, | Performed by: OPTOMETRIST

## 2022-09-06 PROCEDURE — 92014 PR EYE EXAM, EST PATIENT,COMPREHESV: ICD-10-PCS | Mod: S$GLB,,, | Performed by: OPTOMETRIST

## 2022-09-06 PROCEDURE — 3052F PR MOST RECENT HEMOGLOBIN A1C LEVEL 8.0 - < 9.0%: ICD-10-PCS | Mod: CPTII,S$GLB,, | Performed by: OPTOMETRIST

## 2022-09-06 PROCEDURE — 3052F HG A1C>EQUAL 8.0%<EQUAL 9.0%: CPT | Mod: CPTII,S$GLB,, | Performed by: OPTOMETRIST

## 2022-09-06 PROCEDURE — 4010F PR ACE/ARB THEARPY RXD/TAKEN: ICD-10-PCS | Mod: CPTII,S$GLB,, | Performed by: OPTOMETRIST

## 2022-09-06 PROCEDURE — 92014 COMPRE OPH EXAM EST PT 1/>: CPT | Mod: S$GLB,,, | Performed by: OPTOMETRIST

## 2022-09-06 NOTE — PROGRESS NOTES
HPI    Dls: 7/14/20 Dr. Townsend    53 y/o male presents today for diabetic and hypertensive eye exam.  Pt states no changes in vision. Pt wears otc readers.     LBS ?     No tearing  No itching  No burning  No pain  No ha's  No floaters  No flashes    Eye meds  Otc gtts ou prn     Hemoglobin A1C       Date                     Value               Ref Range             Status                06/13/2022               8.0 (H)             4.0 - 5.6 %           Final              05/18/2020               5.7 (H)             4.0 - 5.6 %           Final                  03/03/2020               6.4 (H)             <5.7 % of tota*       Final                 Date                     Value               Ref Range             Status                    04/13/2021               5.9 (H)             <5.7 % of tota*       Final            Last edited by Collin Townsend, OD on 9/6/2022  8:55 AM.            Assessment /Plan     For exam results, see Encounter Report.    Diabetes mellitus type 2 without retinopathy  Screening for diabetic retinopathy  -     Ambulatory referral/consult to Optometry  -No retinopathy noted today.  Continued control with primary care physician and annual comprehensive eye exam.      RTC 1 yr

## 2022-09-18 NOTE — PROGRESS NOTES
The patient location is: home  The chief complaint leading to consultation is: DM, HTN, HLD    Visit type: audiovisual    Face to Face time with patient: 20 minutes  24 minutes of total time spent on the encounter, which includes face to face time and non-face to face time preparing to see the patient (eg, review of tests), Obtaining and/or reviewing separately obtained history, Documenting clinical information in the electronic or other health record, Independently interpreting results (not separately reported) and communicating results to the patient/family/caregiver, or Care coordination (not separately reported).         Each patient to whom he or she provides medical services by telemedicine is:  (1) informed of the relationship between the physician and patient and the respective role of any other health care provider with respect to management of the patient; and (2) notified that he or she may decline to receive medical services by telemedicine and may withdraw from such care at any time.    Notes:     Subjective:       Patient ID: Nicholas Nugetn is a 52 y.o. male.    Chief Complaint: Diabetes    Diabetes  He presents for his follow-up diabetic visit. He has type 2 diabetes mellitus. Pertinent negatives for hypoglycemia include no confusion, dizziness, headaches, pallor, seizures, speech difficulty, sweats or tremors. Associated symptoms include fatigue. Pertinent negatives for diabetes include no blurred vision, no chest pain, no polydipsia, no polyphagia, no polyuria, no visual change and no weakness. Risk factors for coronary artery disease include dyslipidemia, diabetes mellitus, male sex and obesity. Current diabetic treatments: Farxiga, Ozempic, Glipizide. Frequency home blood tests: not checking regularly. Blood glucose monitoring compliance is poor.   Hypertension  This is a chronic problem. Pertinent negatives include no blurred vision, chest pain, headaches, shortness of breath or sweats. Risk  factors for coronary artery disease include diabetes mellitus, male gender, obesity and dyslipidemia. The current treatment provides moderate improvement. Hypertensive end-organ damage includes kidney disease.   Hyperlipidemia  This is a chronic problem. The problem is uncontrolled. Pertinent negatives include no chest pain or shortness of breath. Current antihyperlipidemic treatment includes statins. The current treatment provides moderate improvement of lipids. Risk factors for coronary artery disease include diabetes mellitus, hypertension, male sex, obesity and dyslipidemia.   Review of Systems   Constitutional:  Positive for fatigue.   Eyes:  Negative for blurred vision.   Respiratory:  Negative for shortness of breath.    Cardiovascular:  Negative for chest pain.   Endocrine: Negative for polydipsia, polyphagia and polyuria.   Integumentary:  Negative for pallor.   Neurological:  Negative for dizziness, tremors, seizures, speech difficulty, weakness and headaches.   Psychiatric/Behavioral:  Negative for confusion.        Objective:    There were no vitals taken for this visit.    Physical Exam  Pulmonary:      Effort: Pulmonary effort is normal.   Neurological:      Mental Status: He is alert.   Psychiatric:         Mood and Affect: Mood normal.         Behavior: Behavior normal.       Lab Visit on 06/13/2022   Component Date Value Ref Range Status    Microalbumin, Urine 06/13/2022 283.0  ug/mL Final    Creatinine, Urine 06/13/2022 146.0  23.0 - 375.0 mg/dL Final    Microalb/Creat Ratio 06/13/2022 193.8 (H)  0.0 - 30.0 ug/mg Final   Lab Visit on 06/13/2022   Component Date Value Ref Range Status    WBC 06/13/2022 10.31  3.90 - 12.70 K/uL Final    RBC 06/13/2022 4.77  4.60 - 6.20 M/uL Final    Hemoglobin 06/13/2022 13.6 (L)  14.0 - 18.0 g/dL Final    Hematocrit 06/13/2022 41.5  40.0 - 54.0 % Final    MCV 06/13/2022 87  82 - 98 fL Final    MCH 06/13/2022 28.5  27.0 - 31.0 pg Final    MCHC 06/13/2022 32.8  32.0 -  36.0 g/dL Final    RDW 06/13/2022 14.0  11.5 - 14.5 % Final    Platelets 06/13/2022 207  150 - 450 K/uL Final    MPV 06/13/2022 11.2  9.2 - 12.9 fL Final    Immature Granulocytes 06/13/2022 0.2  0.0 - 0.5 % Final    Gran # (ANC) 06/13/2022 6.1  1.8 - 7.7 K/uL Final    Immature Grans (Abs) 06/13/2022 0.02  0.00 - 0.04 K/uL Final    Comment: Mild elevation in immature granulocytes is non specific and   can be seen in a variety of conditions including stress response,   acute inflammation, trauma and pregnancy. Correlation with other   laboratory and clinical findings is essential.      Lymph # 06/13/2022 3.2  1.0 - 4.8 K/uL Final    Mono # 06/13/2022 0.7  0.3 - 1.0 K/uL Final    Eos # 06/13/2022 0.2  0.0 - 0.5 K/uL Final    Baso # 06/13/2022 0.04  0.00 - 0.20 K/uL Final    nRBC 06/13/2022 0  0 /100 WBC Final    Gran % 06/13/2022 59.4  38.0 - 73.0 % Final    Lymph % 06/13/2022 31.4  18.0 - 48.0 % Final    Mono % 06/13/2022 6.8  4.0 - 15.0 % Final    Eosinophil % 06/13/2022 1.8  0.0 - 8.0 % Final    Basophil % 06/13/2022 0.4  0.0 - 1.9 % Final    Differential Method 06/13/2022 Automated   Final    Sodium 06/13/2022 141  136 - 145 mmol/L Final    Potassium 06/13/2022 4.0  3.5 - 5.1 mmol/L Final    Chloride 06/13/2022 110  95 - 110 mmol/L Final    CO2 06/13/2022 20 (L)  23 - 29 mmol/L Final    Glucose 06/13/2022 159 (H)  70 - 110 mg/dL Final    BUN 06/13/2022 34 (H)  6 - 20 mg/dL Final    Creatinine 06/13/2022 2.1 (H)  0.5 - 1.4 mg/dL Final    Calcium 06/13/2022 9.9  8.7 - 10.5 mg/dL Final    Total Protein 06/13/2022 8.4  6.0 - 8.4 g/dL Final    Albumin 06/13/2022 4.1  3.5 - 5.2 g/dL Final    Total Bilirubin 06/13/2022 1.0  0.1 - 1.0 mg/dL Final    Comment: For infants and newborns, interpretation of results should be based  on gestational age, weight and in agreement with clinical  observations.    Premature Infant recommended reference ranges:  Up to 24 hours.............<8.0 mg/dL  Up to 48 hours............<12.0  mg/dL  3-5 days..................<15.0 mg/dL  6-29 days.................<15.0 mg/dL      Alkaline Phosphatase 06/13/2022 90  55 - 135 U/L Final    AST 06/13/2022 28  10 - 40 U/L Final    ALT 06/13/2022 33  10 - 44 U/L Final    Anion Gap 06/13/2022 11  8 - 16 mmol/L Final    eGFR if African American 06/13/2022 40 (A)  >60 mL/min/1.73 m^2 Final    eGFR if non African American 06/13/2022 35 (A)  >60 mL/min/1.73 m^2 Final    Comment: Calculation used to obtain the estimated glomerular filtration  rate (eGFR) is the CKD-EPI equation.       Hemoglobin A1C 06/13/2022 8.0 (H)  4.0 - 5.6 % Final    Comment: ADA Screening Guidelines:  5.7-6.4%  Consistent with prediabetes  >or=6.5%  Consistent with diabetes    High levels of fetal hemoglobin interfere with the HbA1C  assay. Heterozygous hemoglobin variants (HbS, HgC, etc)do  not significantly interfere with this assay.   However, presence of multiple variants may affect accuracy.      Estimated Avg Glucose 06/13/2022 183 (H)  68 - 131 mg/dL Final    Cholesterol 06/13/2022 131  120 - 199 mg/dL Final    Comment: The National Cholesterol Education Program (NCEP) has set the  following guidelines (reference ranges) for Cholesterol:  Optimal.....................<200 mg/dL  Borderline High.............200-239 mg/dL  High........................> or = 240 mg/dL      Triglycerides 06/13/2022 476 (H)  30 - 150 mg/dL Final    Comment: The National Cholesterol Education Program (NCEP) has set the  following guidelines (reference values) for triglycerides:  Normal......................<150 mg/dL  Borderline High.............150-199 mg/dL  High........................200-499 mg/dL      HDL 06/13/2022 25 (L)  40 - 75 mg/dL Final    Comment: The National Cholesterol Education Program (NCEP) has set the  following guidelines (reference values) for HDL Cholesterol:  Low...............<40 mg/dL  Optimal...........>60 mg/dL      LDL Cholesterol 06/13/2022 Invalid, Trig>400.0  63.0 - 159.0 mg/dL  Final    Comment: The National Cholesterol Education Program (NCEP) has set the  following guidelines (reference values) for LDL Cholesterol:  Optimal.......................<130 mg/dL  Borderline High...............130-159 mg/dL  High..........................160-189 mg/dL  Very High.....................>190 mg/dL      HDL/Cholesterol Ratio 06/13/2022 19.1 (L)  20.0 - 50.0 % Final    Total Cholesterol/HDL Ratio 06/13/2022 5.2 (H)  2.0 - 5.0 Final    Non-HDL Cholesterol 06/13/2022 106  mg/dL Final    Comment: Risk category and Non-HDL cholesterol goals:  Coronary heart disease (CHD)or equivalent (10-year risk of CHD >20%):  Non-HDL cholesterol goal     <130 mg/dL  Two or more CHD risk factors and 10-year risk of CHD <= 20%:  Non-HDL cholesterol goal     <160 mg/dL  0 to 1 CHD risk factor:  Non-HDL cholesterol goal     <190 mg/dL      Hepatitis C Ab 06/13/2022 Negative  Negative Final    PTH, Intact 06/13/2022 73.3  9.0 - 77.0 pg/mL Final    Vit D, 25-Hydroxy 06/13/2022 45  30 - 96 ng/mL Final    Comment: Vitamin D deficiency.........<10 ng/mL                              Vitamin D insufficiency......10-29 ng/mL       Vitamin D sufficiency........> or equal to 30 ng/mL  Vitamin D toxicity............>100 ng/mL           Assessment:       Problem List Items Addressed This Visit          Renal/    Type 2 diabetes mellitus with neuropathy causing erectile dysfunction - Primary    Relevant Medications    blood sugar diagnostic Strp    Stage 3b chronic kidney disease     Other Visit Diagnoses       Benign hypertension        Mixed dyslipidemia        Relevant Medications    omega-3 acid ethyl esters (LOVAZA) 1 gram capsule    Other Relevant Orders    Lipid Panel    Comprehensive Metabolic Panel    Type 2 diabetes mellitus with diabetic neuropathy, without long-term current use of insulin                  Plan:      Nicholas was seen today for diabetes.    Diagnoses and all orders for this visit:    Type 2 diabetes mellitus  with stage 3b chronic kidney disease, without long-term current use of insulin/Type 2 diabetes mellitus with diabetic neuropathy, without long-term current use of insulin  -     Ambulatory referral/consult to Endocrinology; Future  -     emaglutide (OZEMPIC) 1 mg/dose (4 mg/3 mL); Inject 1 mg into the skin every 7 days.  -     blood sugar diagnostic Strp; Test blood sugar with glucometer 3 times daily and PRN  A1c not controlled  Titrate up on Ozempic  Recommended endo eval    Benign hypertension  Will schedule BP check    Mixed dyslipidemia  -     omega-3 acid ethyl esters (LOVAZA) 1 gram capsule; Take 2 capsules (2 g total) by mouth 2 (two) times daily.  -     Lipid Panel; Future  -     Comprehensive Metabolic Panel; Future  Start lovaza    Stage 3b chronic kidney disease  Management by nephro

## 2022-09-26 ENCOUNTER — LAB VISIT (OUTPATIENT)
Dept: LAB | Facility: HOSPITAL | Age: 52
End: 2022-09-26
Attending: FAMILY MEDICINE
Payer: COMMERCIAL

## 2022-09-26 DIAGNOSIS — E78.2 MIXED DYSLIPIDEMIA: ICD-10-CM

## 2022-09-26 DIAGNOSIS — E11.40 TYPE 2 DIABETES MELLITUS WITH NEUROPATHY CAUSING ERECTILE DYSFUNCTION: ICD-10-CM

## 2022-09-26 DIAGNOSIS — N52.1 TYPE 2 DIABETES MELLITUS WITH NEUROPATHY CAUSING ERECTILE DYSFUNCTION: ICD-10-CM

## 2022-09-26 LAB
ALBUMIN SERPL BCP-MCNC: 3.8 G/DL (ref 3.5–5.2)
ALP SERPL-CCNC: 76 U/L (ref 55–135)
ALT SERPL W/O P-5'-P-CCNC: 30 U/L (ref 10–44)
ANION GAP SERPL CALC-SCNC: 6 MMOL/L (ref 8–16)
AST SERPL-CCNC: 27 U/L (ref 10–40)
BILIRUB SERPL-MCNC: 0.8 MG/DL (ref 0.1–1)
BUN SERPL-MCNC: 22 MG/DL (ref 6–20)
CALCIUM SERPL-MCNC: 9.6 MG/DL (ref 8.7–10.5)
CHLORIDE SERPL-SCNC: 110 MMOL/L (ref 95–110)
CHOLEST SERPL-MCNC: 99 MG/DL (ref 120–199)
CHOLEST/HDLC SERPL: 4.1 {RATIO} (ref 2–5)
CO2 SERPL-SCNC: 25 MMOL/L (ref 23–29)
CREAT SERPL-MCNC: 1.9 MG/DL (ref 0.5–1.4)
EST. GFR  (NO RACE VARIABLE): 41 ML/MIN/1.73 M^2
ESTIMATED AVG GLUCOSE: 137 MG/DL (ref 68–131)
GLUCOSE SERPL-MCNC: 132 MG/DL (ref 70–110)
HBA1C MFR BLD: 6.4 % (ref 4–5.6)
HDLC SERPL-MCNC: 24 MG/DL (ref 40–75)
HDLC SERPL: 24.2 % (ref 20–50)
LDLC SERPL CALC-MCNC: 23.2 MG/DL (ref 63–159)
NONHDLC SERPL-MCNC: 75 MG/DL
POTASSIUM SERPL-SCNC: 4.2 MMOL/L (ref 3.5–5.1)
PROT SERPL-MCNC: 8 G/DL (ref 6–8.4)
SODIUM SERPL-SCNC: 141 MMOL/L (ref 136–145)
TRIGL SERPL-MCNC: 259 MG/DL (ref 30–150)

## 2022-09-26 PROCEDURE — 83036 HEMOGLOBIN GLYCOSYLATED A1C: CPT | Performed by: FAMILY MEDICINE

## 2022-09-26 PROCEDURE — 36415 COLL VENOUS BLD VENIPUNCTURE: CPT | Mod: PN | Performed by: FAMILY MEDICINE

## 2022-09-26 PROCEDURE — 80053 COMPREHEN METABOLIC PANEL: CPT | Performed by: FAMILY MEDICINE

## 2022-09-26 PROCEDURE — 80061 LIPID PANEL: CPT | Performed by: FAMILY MEDICINE

## 2022-10-10 ENCOUNTER — OFFICE VISIT (OUTPATIENT)
Dept: FAMILY MEDICINE | Facility: CLINIC | Age: 52
End: 2022-10-10
Payer: COMMERCIAL

## 2022-10-10 VITALS
SYSTOLIC BLOOD PRESSURE: 134 MMHG | BODY MASS INDEX: 30.31 KG/M2 | OXYGEN SATURATION: 99 % | WEIGHT: 193.13 LBS | DIASTOLIC BLOOD PRESSURE: 72 MMHG | TEMPERATURE: 98 F | HEART RATE: 79 BPM | HEIGHT: 67 IN

## 2022-10-10 DIAGNOSIS — E11.22 TYPE 2 DIABETES MELLITUS WITH STAGE 3B CHRONIC KIDNEY DISEASE, WITHOUT LONG-TERM CURRENT USE OF INSULIN: Primary | ICD-10-CM

## 2022-10-10 DIAGNOSIS — N18.32 TYPE 2 DIABETES MELLITUS WITH STAGE 3B CHRONIC KIDNEY DISEASE, WITHOUT LONG-TERM CURRENT USE OF INSULIN: Primary | ICD-10-CM

## 2022-10-10 DIAGNOSIS — E66.09 CLASS 1 OBESITY DUE TO EXCESS CALORIES WITH SERIOUS COMORBIDITY AND BODY MASS INDEX (BMI) OF 30.0 TO 30.9 IN ADULT: ICD-10-CM

## 2022-10-10 DIAGNOSIS — I10 BENIGN HYPERTENSION: ICD-10-CM

## 2022-10-10 DIAGNOSIS — Z28.20 VACCINE REFUSED BY PATIENT: ICD-10-CM

## 2022-10-10 DIAGNOSIS — N52.9 ERECTILE DYSFUNCTION, UNSPECIFIED ERECTILE DYSFUNCTION TYPE: ICD-10-CM

## 2022-10-10 DIAGNOSIS — E78.2 MIXED DYSLIPIDEMIA: ICD-10-CM

## 2022-10-10 PROCEDURE — 3078F DIAST BP <80 MM HG: CPT | Mod: CPTII,S$GLB,, | Performed by: FAMILY MEDICINE

## 2022-10-10 PROCEDURE — 1160F PR REVIEW ALL MEDS BY PRESCRIBER/CLIN PHARMACIST DOCUMENTED: ICD-10-PCS | Mod: CPTII,S$GLB,, | Performed by: FAMILY MEDICINE

## 2022-10-10 PROCEDURE — 3008F PR BODY MASS INDEX (BMI) DOCUMENTED: ICD-10-PCS | Mod: CPTII,S$GLB,, | Performed by: FAMILY MEDICINE

## 2022-10-10 PROCEDURE — 1159F PR MEDICATION LIST DOCUMENTED IN MEDICAL RECORD: ICD-10-PCS | Mod: CPTII,S$GLB,, | Performed by: FAMILY MEDICINE

## 2022-10-10 PROCEDURE — 3008F BODY MASS INDEX DOCD: CPT | Mod: CPTII,S$GLB,, | Performed by: FAMILY MEDICINE

## 2022-10-10 PROCEDURE — 99999 PR PBB SHADOW E&M-EST. PATIENT-LVL IV: ICD-10-PCS | Mod: PBBFAC,,, | Performed by: FAMILY MEDICINE

## 2022-10-10 PROCEDURE — 3060F PR POS MICROALBUMINURIA RESULT DOCUMENTED/REVIEW: ICD-10-PCS | Mod: CPTII,S$GLB,, | Performed by: FAMILY MEDICINE

## 2022-10-10 PROCEDURE — 3075F SYST BP GE 130 - 139MM HG: CPT | Mod: CPTII,S$GLB,, | Performed by: FAMILY MEDICINE

## 2022-10-10 PROCEDURE — 3066F NEPHROPATHY DOC TX: CPT | Mod: CPTII,S$GLB,, | Performed by: FAMILY MEDICINE

## 2022-10-10 PROCEDURE — 99214 OFFICE O/P EST MOD 30 MIN: CPT | Mod: S$GLB,,, | Performed by: FAMILY MEDICINE

## 2022-10-10 PROCEDURE — 3044F PR MOST RECENT HEMOGLOBIN A1C LEVEL <7.0%: ICD-10-PCS | Mod: CPTII,S$GLB,, | Performed by: FAMILY MEDICINE

## 2022-10-10 PROCEDURE — 99214 PR OFFICE/OUTPT VISIT, EST, LEVL IV, 30-39 MIN: ICD-10-PCS | Mod: S$GLB,,, | Performed by: FAMILY MEDICINE

## 2022-10-10 PROCEDURE — 3066F PR DOCUMENTATION OF TREATMENT FOR NEPHROPATHY: ICD-10-PCS | Mod: CPTII,S$GLB,, | Performed by: FAMILY MEDICINE

## 2022-10-10 PROCEDURE — 4010F ACE/ARB THERAPY RXD/TAKEN: CPT | Mod: CPTII,S$GLB,, | Performed by: FAMILY MEDICINE

## 2022-10-10 PROCEDURE — 3078F PR MOST RECENT DIASTOLIC BLOOD PRESSURE < 80 MM HG: ICD-10-PCS | Mod: CPTII,S$GLB,, | Performed by: FAMILY MEDICINE

## 2022-10-10 PROCEDURE — 4010F PR ACE/ARB THEARPY RXD/TAKEN: ICD-10-PCS | Mod: CPTII,S$GLB,, | Performed by: FAMILY MEDICINE

## 2022-10-10 PROCEDURE — 3075F PR MOST RECENT SYSTOLIC BLOOD PRESS GE 130-139MM HG: ICD-10-PCS | Mod: CPTII,S$GLB,, | Performed by: FAMILY MEDICINE

## 2022-10-10 PROCEDURE — 3044F HG A1C LEVEL LT 7.0%: CPT | Mod: CPTII,S$GLB,, | Performed by: FAMILY MEDICINE

## 2022-10-10 PROCEDURE — 3060F POS MICROALBUMINURIA REV: CPT | Mod: CPTII,S$GLB,, | Performed by: FAMILY MEDICINE

## 2022-10-10 PROCEDURE — 1159F MED LIST DOCD IN RCRD: CPT | Mod: CPTII,S$GLB,, | Performed by: FAMILY MEDICINE

## 2022-10-10 PROCEDURE — 1160F RVW MEDS BY RX/DR IN RCRD: CPT | Mod: CPTII,S$GLB,, | Performed by: FAMILY MEDICINE

## 2022-10-10 PROCEDURE — 99999 PR PBB SHADOW E&M-EST. PATIENT-LVL IV: CPT | Mod: PBBFAC,,, | Performed by: FAMILY MEDICINE

## 2022-10-10 RX ORDER — VALSARTAN 320 MG/1
320 TABLET ORAL DAILY
Qty: 90 TABLET | Refills: 3 | Status: SHIPPED | OUTPATIENT
Start: 2022-10-10 | End: 2023-09-19

## 2022-10-10 RX ORDER — ATORVASTATIN CALCIUM 80 MG/1
80 TABLET, FILM COATED ORAL DAILY
Qty: 90 TABLET | Refills: 3 | Status: SHIPPED | OUTPATIENT
Start: 2022-10-10 | End: 2023-09-19 | Stop reason: SDUPTHER

## 2022-10-10 RX ORDER — AMLODIPINE BESYLATE 10 MG/1
10 TABLET ORAL DAILY
Qty: 90 TABLET | Refills: 3 | Status: SHIPPED | OUTPATIENT
Start: 2022-10-10 | End: 2023-09-19

## 2022-10-10 RX ORDER — OMEGA-3-ACID ETHYL ESTERS 1 G/1
2 CAPSULE, LIQUID FILLED ORAL 2 TIMES DAILY
Qty: 360 CAPSULE | Refills: 1 | Status: SHIPPED | OUTPATIENT
Start: 2022-10-10 | End: 2023-12-14

## 2022-10-10 RX ORDER — METOPROLOL SUCCINATE 100 MG/1
TABLET, EXTENDED RELEASE ORAL
Qty: 135 TABLET | Refills: 3 | Status: SHIPPED | OUTPATIENT
Start: 2022-10-10 | End: 2023-09-19 | Stop reason: SDUPTHER

## 2022-10-10 NOTE — PROGRESS NOTES
Subjective:       Patient ID: Nicholas Nugent is a 52 y.o. male.    Chief Complaint: Hypertension, Diabetes, and Hyperlipidemia    Hypertension  This is a chronic problem. The current episode started more than 1 year ago. Pertinent negatives include no anxiety, blurred vision, chest pain, headaches, palpitations, peripheral edema, shortness of breath or sweats. Past treatments include calcium channel blockers, beta blockers and angiotensin blockers. The current treatment provides moderate improvement. There are no compliance problems.  There is no history of angina, kidney disease, CAD/MI, CVA, heart failure, left ventricular hypertrophy, PVD or retinopathy. Identifiable causes of hypertension include chronic renal disease.   Diabetes  He presents for his follow-up diabetic visit. He has type 2 diabetes mellitus. His disease course has been improving. Pertinent negatives for hypoglycemia include no headaches or sweats. Pertinent negatives for diabetes include no blurred vision, no chest pain, no foot paresthesias, no polyphagia, no polyuria and no weight loss. Symptoms are improving. Pertinent negatives for diabetic complications include no CVA, PVD or retinopathy.   Hyperlipidemia  This is a chronic problem. The current episode started more than 1 year ago. Exacerbating diseases include chronic renal disease, diabetes and obesity. Pertinent negatives include no chest pain or shortness of breath. Current antihyperlipidemic treatment includes statins (and Omega-3). There are no compliance problems.    Review of Systems   Constitutional:  Negative for weight loss.   Eyes:  Negative for blurred vision.   Respiratory:  Negative for shortness of breath.    Cardiovascular:  Negative for chest pain and palpitations.   Endocrine: Negative for polyphagia and polyuria.   Neurological:  Negative for headaches.       Objective:      Physical Exam  Vitals reviewed.   Constitutional:       General: He is not in acute distress.      Appearance: He is well-developed. He is not diaphoretic.   HENT:      Head: Normocephalic and atraumatic.      Right Ear: Tympanic membrane, ear canal and external ear normal.      Left Ear: Tympanic membrane, ear canal and external ear normal.      Nose: Nose normal.   Eyes:      General:         Right eye: No discharge.         Left eye: No discharge.      Conjunctiva/sclera: Conjunctivae normal.      Pupils: Pupils are equal, round, and reactive to light.   Neck:      Thyroid: No thyromegaly.      Trachea: No tracheal deviation.   Cardiovascular:      Rate and Rhythm: Normal rate and regular rhythm.      Pulses:           Radial pulses are 2+ on the right side and 2+ on the left side.      Heart sounds: Normal heart sounds, S1 normal and S2 normal. No murmur heard.  Pulmonary:      Effort: Pulmonary effort is normal. No respiratory distress.      Breath sounds: Normal breath sounds. No wheezing, rhonchi or rales.   Abdominal:      General: Bowel sounds are normal. There is no distension.      Palpations: Abdomen is soft. Abdomen is not rigid. There is no mass.      Tenderness: There is no abdominal tenderness. There is no guarding.   Musculoskeletal:      Cervical back: Normal range of motion and neck supple.   Lymphadenopathy:      Cervical: No cervical adenopathy.   Skin:     General: Skin is warm and dry.      Capillary Refill: Capillary refill takes less than 2 seconds.      Findings: No rash.   Neurological:      Mental Status: He is alert and oriented to person, place, and time.      Cranial Nerves: No cranial nerve deficit.      Sensory: No sensory deficit.      Motor: No atrophy or abnormal muscle tone.      Deep Tendon Reflexes:      Reflex Scores:       Patellar reflexes are 2+ on the right side and 2+ on the left side.  Psychiatric:         Behavior: Behavior normal.         Protective Sensation (w/ 10 gram monofilament):  Right: Intact  Left: Intact    Visual Inspection:  Normal -  Bilateral    Pedal  Pulses:   Right: Present  Left: Present    Posterior tibialis:   Right:Present  Left: Present    Lab Visit on 09/26/2022   Component Date Value Ref Range Status    Sodium 09/26/2022 141  136 - 145 mmol/L Final    Potassium 09/26/2022 4.2  3.5 - 5.1 mmol/L Final    Chloride 09/26/2022 110  95 - 110 mmol/L Final    CO2 09/26/2022 25  23 - 29 mmol/L Final    Glucose 09/26/2022 132 (H)  70 - 110 mg/dL Final    BUN 09/26/2022 22 (H)  6 - 20 mg/dL Final    Creatinine 09/26/2022 1.9 (H)  0.5 - 1.4 mg/dL Final    Calcium 09/26/2022 9.6  8.7 - 10.5 mg/dL Final    Total Protein 09/26/2022 8.0  6.0 - 8.4 g/dL Final    Albumin 09/26/2022 3.8  3.5 - 5.2 g/dL Final    Total Bilirubin 09/26/2022 0.8  0.1 - 1.0 mg/dL Final    Comment: For infants and newborns, interpretation of results should be based  on gestational age, weight and in agreement with clinical  observations.    Premature Infant recommended reference ranges:  Up to 24 hours.............<8.0 mg/dL  Up to 48 hours............<12.0 mg/dL  3-5 days..................<15.0 mg/dL  6-29 days.................<15.0 mg/dL      Alkaline Phosphatase 09/26/2022 76  55 - 135 U/L Final    AST 09/26/2022 27  10 - 40 U/L Final    ALT 09/26/2022 30  10 - 44 U/L Final    Anion Gap 09/26/2022 6 (L)  8 - 16 mmol/L Final    eGFR 09/26/2022 41 (A)  >60 mL/min/1.73 m^2 Final    Hemoglobin A1C 09/26/2022 6.4 (H)  4.0 - 5.6 % Final    Comment: ADA Screening Guidelines:  5.7-6.4%  Consistent with prediabetes  >or=6.5%  Consistent with diabetes    High levels of fetal hemoglobin interfere with the HbA1C  assay. Heterozygous hemoglobin variants (HbS, HgC, etc)do  not significantly interfere with this assay.   However, presence of multiple variants may affect accuracy.      Estimated Avg Glucose 09/26/2022 137 (H)  68 - 131 mg/dL Final    Cholesterol 09/26/2022 99 (L)  120 - 199 mg/dL Final    Comment: The National Cholesterol Education Program (NCEP) has set the  following guidelines (reference  ranges) for Cholesterol:  Optimal.....................<200 mg/dL  Borderline High.............200-239 mg/dL  High........................> or = 240 mg/dL      Triglycerides 09/26/2022 259 (H)  30 - 150 mg/dL Final    Comment: The National Cholesterol Education Program (NCEP) has set the  following guidelines (reference values) for triglycerides:  Normal......................<150 mg/dL  Borderline High.............150-199 mg/dL  High........................200-499 mg/dL      HDL 09/26/2022 24 (L)  40 - 75 mg/dL Final    Comment: The National Cholesterol Education Program (NCEP) has set the  following guidelines (reference values) for HDL Cholesterol:  Low...............<40 mg/dL  Optimal...........>60 mg/dL      LDL Cholesterol 09/26/2022 23.2 (L)  63.0 - 159.0 mg/dL Final    Comment: The National Cholesterol Education Program (NCEP) has set the  following guidelines (reference values) for LDL Cholesterol:  Optimal.......................<130 mg/dL  Borderline High...............130-159 mg/dL  High..........................160-189 mg/dL  Very High.....................>190 mg/dL      HDL/Cholesterol Ratio 09/26/2022 24.2  20.0 - 50.0 % Final    Total Cholesterol/HDL Ratio 09/26/2022 4.1  2.0 - 5.0 Final    Non-HDL Cholesterol 09/26/2022 75  mg/dL Final    Comment: Risk category and Non-HDL cholesterol goals:  Coronary heart disease (CHD)or equivalent (10-year risk of CHD >20%):  Non-HDL cholesterol goal     <130 mg/dL  Two or more CHD risk factors and 10-year risk of CHD <= 20%:  Non-HDL cholesterol goal     <160 mg/dL  0 to 1 CHD risk factor:  Non-HDL cholesterol goal     <190 mg/dL         Assessment:       Problem List Items Addressed This Visit    None      Plan:       Nicholas was seen today for hypertension, diabetes and hyperlipidemia.    Diagnoses and all orders for this visit:    Type 2 diabetes mellitus with stage 3b chronic kidney disease, without long-term current use of insulin  -     Comprehensive Metabolic  Panel; Future  -     Hemoglobin A1C; Future  -     CBC Auto Differential; Future  A1c better.  Continue current regimen and follow up with endo    Mixed dyslipidemia  -     atorvastatin (LIPITOR) 80 MG tablet; Take 1 tablet (80 mg total) by mouth once daily.  -     omega-3 acid ethyl esters (LOVAZA) 1 gram capsule; Take 2 capsules (2 g total) by mouth 2 (two) times daily.  -     Comprehensive Metabolic Panel; Future  -     Lipid Panel; Future  -     CBC Auto Differential; Future  Triglycerides still elevated but overall much better than previous.  Continue current regimen.    Benign hypertension  -     valsartan (DIOVAN) 320 MG tablet; Take 1 tablet (320 mg total) by mouth once daily.  -     amLODIPine (NORVASC) 10 MG tablet; Take 1 tablet (10 mg total) by mouth once daily.  -     metoprolol succinate (TOPROL-XL) 100 MG 24 hr tablet; Take 1.5 tablets PO daily  -     Comprehensive Metabolic Panel; Future  -     CBC Auto Differential; Future  Increase Toprol to 150 mg daily    Erectile dysfunction, unspecified erectile dysfunction type  Continue sildenafil PRN    Class 1 obesity due to excess calories with serious comorbidity and body mass index (BMI) of 30.0 to 30.9 in adult  The patient's BMI has been recorded in the chart. The patient has been provided educational materials regarding the benefits of attaining and maintaining a normal weight. We will continue to address and follow this issue during follow up visits.    Vaccine refused by patient  Declined flu, prevnar 20, and shingles vaccine.

## 2023-03-13 ENCOUNTER — LAB VISIT (OUTPATIENT)
Dept: LAB | Facility: HOSPITAL | Age: 53
End: 2023-03-13
Attending: FAMILY MEDICINE
Payer: COMMERCIAL

## 2023-03-13 DIAGNOSIS — E78.2 MIXED DYSLIPIDEMIA: ICD-10-CM

## 2023-03-13 DIAGNOSIS — I10 BENIGN HYPERTENSION: ICD-10-CM

## 2023-03-13 DIAGNOSIS — N18.32 TYPE 2 DIABETES MELLITUS WITH STAGE 3B CHRONIC KIDNEY DISEASE, WITHOUT LONG-TERM CURRENT USE OF INSULIN: ICD-10-CM

## 2023-03-13 DIAGNOSIS — E11.22 TYPE 2 DIABETES MELLITUS WITH STAGE 3B CHRONIC KIDNEY DISEASE, WITHOUT LONG-TERM CURRENT USE OF INSULIN: ICD-10-CM

## 2023-03-13 LAB
ALBUMIN SERPL BCP-MCNC: 3.9 G/DL (ref 3.5–5.2)
ALBUMIN SERPL BCP-MCNC: 3.9 G/DL (ref 3.5–5.2)
ALP SERPL-CCNC: 91 U/L (ref 55–135)
ALP SERPL-CCNC: 91 U/L (ref 55–135)
ALT SERPL W/O P-5'-P-CCNC: 33 U/L (ref 10–44)
ALT SERPL W/O P-5'-P-CCNC: 33 U/L (ref 10–44)
ANION GAP SERPL CALC-SCNC: 10 MMOL/L (ref 8–16)
ANION GAP SERPL CALC-SCNC: 10 MMOL/L (ref 8–16)
AST SERPL-CCNC: 27 U/L (ref 10–40)
AST SERPL-CCNC: 27 U/L (ref 10–40)
BASOPHILS # BLD AUTO: 0.03 K/UL (ref 0–0.2)
BASOPHILS NFR BLD: 0.3 % (ref 0–1.9)
BILIRUB SERPL-MCNC: 1 MG/DL (ref 0.1–1)
BILIRUB SERPL-MCNC: 1 MG/DL (ref 0.1–1)
BUN SERPL-MCNC: 19 MG/DL (ref 6–20)
BUN SERPL-MCNC: 19 MG/DL (ref 6–20)
CALCIUM SERPL-MCNC: 9.6 MG/DL (ref 8.7–10.5)
CALCIUM SERPL-MCNC: 9.6 MG/DL (ref 8.7–10.5)
CHLORIDE SERPL-SCNC: 110 MMOL/L (ref 95–110)
CHLORIDE SERPL-SCNC: 110 MMOL/L (ref 95–110)
CHOLEST SERPL-MCNC: 107 MG/DL (ref 120–199)
CHOLEST/HDLC SERPL: 4 {RATIO} (ref 2–5)
CO2 SERPL-SCNC: 25 MMOL/L (ref 23–29)
CO2 SERPL-SCNC: 25 MMOL/L (ref 23–29)
CREAT SERPL-MCNC: 1.6 MG/DL (ref 0.5–1.4)
CREAT SERPL-MCNC: 1.6 MG/DL (ref 0.5–1.4)
DIFFERENTIAL METHOD: ABNORMAL
EOSINOPHIL # BLD AUTO: 0.2 K/UL (ref 0–0.5)
EOSINOPHIL NFR BLD: 1.3 % (ref 0–8)
ERYTHROCYTE [DISTWIDTH] IN BLOOD BY AUTOMATED COUNT: 13.9 % (ref 11.5–14.5)
EST. GFR  (NO RACE VARIABLE): 51 ML/MIN/1.73 M^2
EST. GFR  (NO RACE VARIABLE): 51 ML/MIN/1.73 M^2
ESTIMATED AVG GLUCOSE: 131 MG/DL (ref 68–131)
GLUCOSE SERPL-MCNC: 134 MG/DL (ref 70–110)
GLUCOSE SERPL-MCNC: 134 MG/DL (ref 70–110)
HBA1C MFR BLD: 6.2 % (ref 4–5.6)
HCT VFR BLD AUTO: 41.1 % (ref 40–54)
HDLC SERPL-MCNC: 27 MG/DL (ref 40–75)
HDLC SERPL: 25.2 % (ref 20–50)
HGB BLD-MCNC: 13.7 G/DL (ref 14–18)
IMM GRANULOCYTES # BLD AUTO: 0.02 K/UL (ref 0–0.04)
IMM GRANULOCYTES NFR BLD AUTO: 0.2 % (ref 0–0.5)
LDLC SERPL CALC-MCNC: 23.4 MG/DL (ref 63–159)
LYMPHOCYTES # BLD AUTO: 2.8 K/UL (ref 1–4.8)
LYMPHOCYTES NFR BLD: 24.4 % (ref 18–48)
MCH RBC QN AUTO: 29 PG (ref 27–31)
MCHC RBC AUTO-ENTMCNC: 33.3 G/DL (ref 32–36)
MCV RBC AUTO: 87 FL (ref 82–98)
MONOCYTES # BLD AUTO: 0.8 K/UL (ref 0.3–1)
MONOCYTES NFR BLD: 7.4 % (ref 4–15)
NEUTROPHILS # BLD AUTO: 7.5 K/UL (ref 1.8–7.7)
NEUTROPHILS NFR BLD: 66.4 % (ref 38–73)
NONHDLC SERPL-MCNC: 80 MG/DL
NRBC BLD-RTO: 0 /100 WBC
PLATELET # BLD AUTO: 220 K/UL (ref 150–450)
PMV BLD AUTO: 10.6 FL (ref 9.2–12.9)
POTASSIUM SERPL-SCNC: 4.5 MMOL/L (ref 3.5–5.1)
POTASSIUM SERPL-SCNC: 4.5 MMOL/L (ref 3.5–5.1)
PROT SERPL-MCNC: 8.3 G/DL (ref 6–8.4)
PROT SERPL-MCNC: 8.3 G/DL (ref 6–8.4)
RBC # BLD AUTO: 4.73 M/UL (ref 4.6–6.2)
SODIUM SERPL-SCNC: 145 MMOL/L (ref 136–145)
SODIUM SERPL-SCNC: 145 MMOL/L (ref 136–145)
TRIGL SERPL-MCNC: 283 MG/DL (ref 30–150)
WBC # BLD AUTO: 11.33 K/UL (ref 3.9–12.7)

## 2023-03-13 PROCEDURE — 36415 COLL VENOUS BLD VENIPUNCTURE: CPT | Mod: PN | Performed by: FAMILY MEDICINE

## 2023-03-13 PROCEDURE — 85025 COMPLETE CBC W/AUTO DIFF WBC: CPT | Performed by: FAMILY MEDICINE

## 2023-03-13 PROCEDURE — 80061 LIPID PANEL: CPT | Performed by: FAMILY MEDICINE

## 2023-03-13 PROCEDURE — 83036 HEMOGLOBIN GLYCOSYLATED A1C: CPT | Performed by: FAMILY MEDICINE

## 2023-03-13 PROCEDURE — 80053 COMPREHEN METABOLIC PANEL: CPT | Performed by: FAMILY MEDICINE

## 2023-03-20 ENCOUNTER — OFFICE VISIT (OUTPATIENT)
Dept: FAMILY MEDICINE | Facility: CLINIC | Age: 53
End: 2023-03-20
Payer: COMMERCIAL

## 2023-03-20 VITALS
DIASTOLIC BLOOD PRESSURE: 74 MMHG | OXYGEN SATURATION: 99 % | HEIGHT: 67 IN | SYSTOLIC BLOOD PRESSURE: 130 MMHG | TEMPERATURE: 98 F | HEART RATE: 75 BPM | BODY MASS INDEX: 29.41 KG/M2 | WEIGHT: 187.38 LBS

## 2023-03-20 DIAGNOSIS — I10 BENIGN HYPERTENSION: Chronic | ICD-10-CM

## 2023-03-20 DIAGNOSIS — R80.1 PERSISTENT PROTEINURIA: Chronic | ICD-10-CM

## 2023-03-20 DIAGNOSIS — L21.9 SEBORRHEIC DERMATITIS: Chronic | ICD-10-CM

## 2023-03-20 DIAGNOSIS — E21.3 HYPERPARATHYROIDISM: Chronic | ICD-10-CM

## 2023-03-20 DIAGNOSIS — N52.9 ERECTILE DYSFUNCTION, UNSPECIFIED ERECTILE DYSFUNCTION TYPE: Chronic | ICD-10-CM

## 2023-03-20 DIAGNOSIS — Z13.5 SCREENING FOR DIABETIC RETINOPATHY: ICD-10-CM

## 2023-03-20 DIAGNOSIS — E11.21 TYPE 2 DIABETES MELLITUS WITH DIABETIC NEPHROPATHY, WITHOUT LONG-TERM CURRENT USE OF INSULIN: Primary | Chronic | ICD-10-CM

## 2023-03-20 DIAGNOSIS — Z28.21 VACCINATION DECLINED: ICD-10-CM

## 2023-03-20 DIAGNOSIS — Z12.5 SCREENING FOR PROSTATE CANCER: ICD-10-CM

## 2023-03-20 DIAGNOSIS — N18.31 CHRONIC KIDNEY DISEASE, STAGE 3A: Chronic | ICD-10-CM

## 2023-03-20 DIAGNOSIS — E78.2 MIXED DYSLIPIDEMIA: Chronic | ICD-10-CM

## 2023-03-20 PROCEDURE — 4010F ACE/ARB THERAPY RXD/TAKEN: CPT | Mod: CPTII,S$GLB,, | Performed by: FAMILY MEDICINE

## 2023-03-20 PROCEDURE — 3078F DIAST BP <80 MM HG: CPT | Mod: CPTII,S$GLB,, | Performed by: FAMILY MEDICINE

## 2023-03-20 PROCEDURE — 3072F LOW RISK FOR RETINOPATHY: CPT | Mod: CPTII,S$GLB,, | Performed by: FAMILY MEDICINE

## 2023-03-20 PROCEDURE — 3078F PR MOST RECENT DIASTOLIC BLOOD PRESSURE < 80 MM HG: ICD-10-PCS | Mod: CPTII,S$GLB,, | Performed by: FAMILY MEDICINE

## 2023-03-20 PROCEDURE — 3044F HG A1C LEVEL LT 7.0%: CPT | Mod: CPTII,S$GLB,, | Performed by: FAMILY MEDICINE

## 2023-03-20 PROCEDURE — 1159F PR MEDICATION LIST DOCUMENTED IN MEDICAL RECORD: ICD-10-PCS | Mod: CPTII,S$GLB,, | Performed by: FAMILY MEDICINE

## 2023-03-20 PROCEDURE — 3072F PR LOW RISK FOR RETINOPATHY: ICD-10-PCS | Mod: CPTII,S$GLB,, | Performed by: FAMILY MEDICINE

## 2023-03-20 PROCEDURE — 1159F MED LIST DOCD IN RCRD: CPT | Mod: CPTII,S$GLB,, | Performed by: FAMILY MEDICINE

## 2023-03-20 PROCEDURE — 99999 PR PBB SHADOW E&M-EST. PATIENT-LVL V: CPT | Mod: PBBFAC,,, | Performed by: FAMILY MEDICINE

## 2023-03-20 PROCEDURE — 4010F PR ACE/ARB THEARPY RXD/TAKEN: ICD-10-PCS | Mod: CPTII,S$GLB,, | Performed by: FAMILY MEDICINE

## 2023-03-20 PROCEDURE — 3075F PR MOST RECENT SYSTOLIC BLOOD PRESS GE 130-139MM HG: ICD-10-PCS | Mod: CPTII,S$GLB,, | Performed by: FAMILY MEDICINE

## 2023-03-20 PROCEDURE — 1160F PR REVIEW ALL MEDS BY PRESCRIBER/CLIN PHARMACIST DOCUMENTED: ICD-10-PCS | Mod: CPTII,S$GLB,, | Performed by: FAMILY MEDICINE

## 2023-03-20 PROCEDURE — 3075F SYST BP GE 130 - 139MM HG: CPT | Mod: CPTII,S$GLB,, | Performed by: FAMILY MEDICINE

## 2023-03-20 PROCEDURE — 3008F BODY MASS INDEX DOCD: CPT | Mod: CPTII,S$GLB,, | Performed by: FAMILY MEDICINE

## 2023-03-20 PROCEDURE — 3008F PR BODY MASS INDEX (BMI) DOCUMENTED: ICD-10-PCS | Mod: CPTII,S$GLB,, | Performed by: FAMILY MEDICINE

## 2023-03-20 PROCEDURE — 1160F RVW MEDS BY RX/DR IN RCRD: CPT | Mod: CPTII,S$GLB,, | Performed by: FAMILY MEDICINE

## 2023-03-20 PROCEDURE — 99214 PR OFFICE/OUTPT VISIT, EST, LEVL IV, 30-39 MIN: ICD-10-PCS | Mod: S$GLB,,, | Performed by: FAMILY MEDICINE

## 2023-03-20 PROCEDURE — 99999 PR PBB SHADOW E&M-EST. PATIENT-LVL V: ICD-10-PCS | Mod: PBBFAC,,, | Performed by: FAMILY MEDICINE

## 2023-03-20 PROCEDURE — 3044F PR MOST RECENT HEMOGLOBIN A1C LEVEL <7.0%: ICD-10-PCS | Mod: CPTII,S$GLB,, | Performed by: FAMILY MEDICINE

## 2023-03-20 PROCEDURE — 99214 OFFICE O/P EST MOD 30 MIN: CPT | Mod: S$GLB,,, | Performed by: FAMILY MEDICINE

## 2023-03-20 RX ORDER — KETOCONAZOLE 20 MG/ML
SHAMPOO, SUSPENSION TOPICAL
Qty: 120 ML | Refills: 11 | Status: SHIPPED | OUTPATIENT
Start: 2023-03-20 | End: 2024-04-03

## 2023-03-20 NOTE — PROGRESS NOTES
Patient Name: Nicholas Nugent    : 1970  MRN: [id number]      Subjective:     Patient ID: Nicholas is a 53 y.o. male    Chief Complaint:  Diabetes and Hypertension    Diabetes  He presents for his follow-up diabetic visit. He has type 2 diabetes mellitus. His disease course has been stable. Pertinent negatives for hypoglycemia include no headaches or sweats. Pertinent negatives for diabetes include no blurred vision, no chest pain, no fatigue, no foot paresthesias, no polyphagia, no polyuria and no weight loss. Symptoms are improving. Diabetic complications include impotence. Pertinent negatives for diabetic complications include no CVA, PVD or retinopathy. Risk factors for coronary artery disease include male sex, hypertension, dyslipidemia and diabetes mellitus. Current diabetic treatments: Ozempic weekly and Farxiga. His weight is decreasing steadily. Diabetic current diet: states he does have several dietary indescretions. He rarely participates in exercise. There is no compliance with monitoring of blood glucose. An ACE inhibitor/angiotensin II receptor blocker is being taken. Eye exam is current.   Hypertension  This is a chronic problem. The current episode started more than 1 year ago. Pertinent negatives include no anxiety, blurred vision, chest pain, headaches, palpitations, peripheral edema, shortness of breath or sweats. Risk factors for coronary artery disease include male gender, diabetes mellitus and dyslipidemia. Past treatments include calcium channel blockers, beta blockers and angiotensin blockers. The current treatment provides moderate improvement. There are no compliance problems.  There is no history of angina, kidney disease, CAD/MI, CVA, heart failure, left ventricular hypertrophy, PVD or retinopathy. Identifiable causes of hypertension include chronic renal disease.   Hyperlipidemia  This is a chronic problem. The current episode started more than 1 year ago. Exacerbating diseases include  "chronic renal disease and diabetes. Pertinent negatives include no chest pain or shortness of breath. Current antihyperlipidemic treatment includes statins (and Omega-3). There are no compliance problems.  Risk factors for coronary artery disease include hypertension, male sex, dyslipidemia and diabetes mellitus.      Review of Systems   Constitutional:  Negative for chills, fatigue, fever and weight loss.   Eyes:  Negative for blurred vision and visual disturbance.   Respiratory:  Negative for shortness of breath.    Cardiovascular:  Negative for chest pain and palpitations.   Gastrointestinal:  Negative for abdominal pain, blood in stool, change in bowel habit and change in bowel habit.   Endocrine: Negative for polyphagia and polyuria.   Genitourinary:  Positive for erectile dysfunction and impotence. Negative for dysuria and hematuria.   Integumentary:         Excessive dandruff associated with itching   Neurological:  Negative for headaches.      Objective:   /74 (BP Location: Left arm, Patient Position: Sitting, BP Method: Large (Manual))   Pulse 75   Temp 98 °F (36.7 °C) (Oral)   Ht 5' 7" (1.702 m)   Wt 85 kg (187 lb 6.3 oz)   SpO2 99%   BMI 29.35 kg/m²     Physical Exam  Vitals reviewed.   Constitutional:       General: He is not in acute distress.     Appearance: He is well-developed. He is not diaphoretic.   HENT:      Head: Normocephalic.      Right Ear: External ear normal.      Left Ear: External ear normal.      Nose: Nose normal.      Mouth/Throat:      Pharynx: No oropharyngeal exudate.   Neck:      Trachea: No tracheal deviation.   Cardiovascular:      Rate and Rhythm: Normal rate and regular rhythm.      Heart sounds: Normal heart sounds.   Pulmonary:      Effort: Pulmonary effort is normal.      Breath sounds: Normal breath sounds. No wheezing or rales.   Abdominal:      General: Bowel sounds are normal.      Palpations: Abdomen is soft. Abdomen is not rigid. There is no mass.      " Tenderness: There is no abdominal tenderness. There is no guarding or rebound.   Musculoskeletal:      Cervical back: Normal range of motion and neck supple.   Lymphadenopathy:      Cervical: No cervical adenopathy.   Neurological:      Mental Status: He is alert and oriented to person, place, and time.      Gait: Gait normal.      Lab Visit on 03/13/2023   Component Date Value Ref Range Status    Sodium 03/13/2023 145  136 - 145 mmol/L Final    Potassium 03/13/2023 4.5  3.5 - 5.1 mmol/L Final    Chloride 03/13/2023 110  95 - 110 mmol/L Final    CO2 03/13/2023 25  23 - 29 mmol/L Final    Glucose 03/13/2023 134 (H)  70 - 110 mg/dL Final    BUN 03/13/2023 19  6 - 20 mg/dL Final    Creatinine 03/13/2023 1.6 (H)  0.5 - 1.4 mg/dL Final    Calcium 03/13/2023 9.6  8.7 - 10.5 mg/dL Final    Total Protein 03/13/2023 8.3  6.0 - 8.4 g/dL Final    Albumin 03/13/2023 3.9  3.5 - 5.2 g/dL Final    Total Bilirubin 03/13/2023 1.0  0.1 - 1.0 mg/dL Final    Comment: For infants and newborns, interpretation of results should be based  on gestational age, weight and in agreement with clinical  observations.    Premature Infant recommended reference ranges:  Up to 24 hours.............<8.0 mg/dL  Up to 48 hours............<12.0 mg/dL  3-5 days..................<15.0 mg/dL  6-29 days.................<15.0 mg/dL      Alkaline Phosphatase 03/13/2023 91  55 - 135 U/L Final    AST 03/13/2023 27  10 - 40 U/L Final    ALT 03/13/2023 33  10 - 44 U/L Final    Anion Gap 03/13/2023 10  8 - 16 mmol/L Final    eGFR 03/13/2023 51 (A)  >60 mL/min/1.73 m^2 Final    Sodium 03/13/2023 145  136 - 145 mmol/L Final    Potassium 03/13/2023 4.5  3.5 - 5.1 mmol/L Final    Chloride 03/13/2023 110  95 - 110 mmol/L Final    CO2 03/13/2023 25  23 - 29 mmol/L Final    Glucose 03/13/2023 134 (H)  70 - 110 mg/dL Final    BUN 03/13/2023 19  6 - 20 mg/dL Final    Creatinine 03/13/2023 1.6 (H)  0.5 - 1.4 mg/dL Final    Calcium 03/13/2023 9.6  8.7 - 10.5 mg/dL Final     Total Protein 03/13/2023 8.3  6.0 - 8.4 g/dL Final    Albumin 03/13/2023 3.9  3.5 - 5.2 g/dL Final    Total Bilirubin 03/13/2023 1.0  0.1 - 1.0 mg/dL Final    Comment: For infants and newborns, interpretation of results should be based  on gestational age, weight and in agreement with clinical  observations.    Premature Infant recommended reference ranges:  Up to 24 hours.............<8.0 mg/dL  Up to 48 hours............<12.0 mg/dL  3-5 days..................<15.0 mg/dL  6-29 days.................<15.0 mg/dL      Alkaline Phosphatase 03/13/2023 91  55 - 135 U/L Final    AST 03/13/2023 27  10 - 40 U/L Final    ALT 03/13/2023 33  10 - 44 U/L Final    Anion Gap 03/13/2023 10  8 - 16 mmol/L Final    eGFR 03/13/2023 51 (A)  >60 mL/min/1.73 m^2 Final    Cholesterol 03/13/2023 107 (L)  120 - 199 mg/dL Final    Comment: The National Cholesterol Education Program (NCEP) has set the  following guidelines (reference ranges) for Cholesterol:  Optimal.....................<200 mg/dL  Borderline High.............200-239 mg/dL  High........................> or = 240 mg/dL      Triglycerides 03/13/2023 283 (H)  30 - 150 mg/dL Final    Comment: The National Cholesterol Education Program (NCEP) has set the  following guidelines (reference values) for triglycerides:  Normal......................<150 mg/dL  Borderline High.............150-199 mg/dL  High........................200-499 mg/dL      HDL 03/13/2023 27 (L)  40 - 75 mg/dL Final    Comment: The National Cholesterol Education Program (NCEP) has set the  following guidelines (reference values) for HDL Cholesterol:  Low...............<40 mg/dL  Optimal...........>60 mg/dL      LDL Cholesterol 03/13/2023 23.4 (L)  63.0 - 159.0 mg/dL Final    Comment: The National Cholesterol Education Program (NCEP) has set the  following guidelines (reference values) for LDL Cholesterol:  Optimal.......................<130 mg/dL  Borderline High...............130-159  mg/dL  High..........................160-189 mg/dL  Very High.....................>190 mg/dL      HDL/Cholesterol Ratio 03/13/2023 25.2  20.0 - 50.0 % Final    Total Cholesterol/HDL Ratio 03/13/2023 4.0  2.0 - 5.0 Final    Non-HDL Cholesterol 03/13/2023 80  mg/dL Final    Comment: Risk category and Non-HDL cholesterol goals:  Coronary heart disease (CHD)or equivalent (10-year risk of CHD >20%):  Non-HDL cholesterol goal     <130 mg/dL  Two or more CHD risk factors and 10-year risk of CHD <= 20%:  Non-HDL cholesterol goal     <160 mg/dL  0 to 1 CHD risk factor:  Non-HDL cholesterol goal     <190 mg/dL      Hemoglobin A1C 03/13/2023 6.2 (H)  4.0 - 5.6 % Final    Comment: ADA Screening Guidelines:  5.7-6.4%  Consistent with prediabetes  >or=6.5%  Consistent with diabetes    High levels of fetal hemoglobin interfere with the HbA1C  assay. Heterozygous hemoglobin variants (HbS, HgC, etc)do  not significantly interfere with this assay.   However, presence of multiple variants may affect accuracy.      Estimated Avg Glucose 03/13/2023 131  68 - 131 mg/dL Final    WBC 03/13/2023 11.33  3.90 - 12.70 K/uL Final    RBC 03/13/2023 4.73  4.60 - 6.20 M/uL Final    Hemoglobin 03/13/2023 13.7 (L)  14.0 - 18.0 g/dL Final    Hematocrit 03/13/2023 41.1  40.0 - 54.0 % Final    MCV 03/13/2023 87  82 - 98 fL Final    MCH 03/13/2023 29.0  27.0 - 31.0 pg Final    MCHC 03/13/2023 33.3  32.0 - 36.0 g/dL Final    RDW 03/13/2023 13.9  11.5 - 14.5 % Final    Platelets 03/13/2023 220  150 - 450 K/uL Final    MPV 03/13/2023 10.6  9.2 - 12.9 fL Final    Immature Granulocytes 03/13/2023 0.2  0.0 - 0.5 % Final    Gran # (ANC) 03/13/2023 7.5  1.8 - 7.7 K/uL Final    Immature Grans (Abs) 03/13/2023 0.02  0.00 - 0.04 K/uL Final    Comment: Mild elevation in immature granulocytes is non specific and   can be seen in a variety of conditions including stress response,   acute inflammation, trauma and pregnancy. Correlation with other   laboratory and  clinical findings is essential.      Lymph # 03/13/2023 2.8  1.0 - 4.8 K/uL Final    Mono # 03/13/2023 0.8  0.3 - 1.0 K/uL Final    Eos # 03/13/2023 0.2  0.0 - 0.5 K/uL Final    Baso # 03/13/2023 0.03  0.00 - 0.20 K/uL Final    nRBC 03/13/2023 0  0 /100 WBC Final    Gran % 03/13/2023 66.4  38.0 - 73.0 % Final    Lymph % 03/13/2023 24.4  18.0 - 48.0 % Final    Mono % 03/13/2023 7.4  4.0 - 15.0 % Final    Eosinophil % 03/13/2023 1.3  0.0 - 8.0 % Final    Basophil % 03/13/2023 0.3  0.0 - 1.9 % Final    Differential Method 03/13/2023 Automated   Final       Assessment        ICD-10-CM ICD-9-CM   1. Type 2 diabetes mellitus with diabetic nephropathy, without long-term current use of insulin  E11.21 250.40     583.81   2. Chronic kidney disease, stage 3a  N18.31 585.3   3. Mixed dyslipidemia  E78.2 272.2   4. Benign hypertension  I10 401.1   5. Hyperparathyroidism  E21.3 252.00   6. Persistent proteinuria  R80.1 791.0   7. Seborrheic dermatitis  L21.9 690.10   8. Screening for diabetic retinopathy  Z13.5 V80.2   9. Screening for prostate cancer  Z12.5 V76.44   10. Erectile dysfunction, unspecified erectile dysfunction type  N52.9 607.84   11. Vaccination declined  Z28.21 V64.06         Plan:     1. Type 2 diabetes mellitus with diabetic nephropathy, without long-term current use of insulin  Assessment & Plan:  A1c shows good control.  Continue current diabetic regimen.    Orders:  -     Microalbumin/creatinine urine ratio; Future; Expected date: 09/20/2023  -     CBC auto differential; Future; Expected date: 09/20/2023  -     Comprehensive metabolic panel; Future; Expected date: 09/20/2023  -     Hemoglobin A1c; Future; Expected date: 09/20/2023  -     Lipid panel; Future; Expected date: 09/20/2023  -     Ambulatory referral/consult to Optometry; Future; Expected date: 09/06/2023    2. Chronic kidney disease, stage 3a  Assessment & Plan:  Patient seen by Nephrology last month.  Improvement in renal functions noted.   Continue management as per Nephrology recommendations.       3. Mixed dyslipidemia  Assessment & Plan:  Patient is currently on maximum dose of atorvastatin as well as on Lovaza.  His triglycerides remain elevated but better than previous.  Unable to go up any further medications given low LDL.  Continue current regimen.    Orders:  -     Comprehensive metabolic panel; Future; Expected date: 09/20/2023  -     Lipid panel; Future; Expected date: 09/20/2023    4. Benign hypertension  Assessment & Plan:  Fair BP control.  Continue current regimen.    Orders:  -     Microalbumin/creatinine urine ratio; Future; Expected date: 09/20/2023  -     CBC auto differential; Future; Expected date: 09/20/2023  -     Comprehensive metabolic panel; Future; Expected date: 09/20/2023    5. Hyperparathyroidism  Assessment & Plan:  As per nephrology note, PTH in acceptable range for his kidney disease status.    Orders:  -     DXA Bone Density Axial Skeleton 1 or more sites; Future; Expected date: 03/20/2023  -     PTH, intact; Future; Expected date: 09/20/2023  -     Vitamin D; Future; Expected date: 09/20/2023    6. Persistent proteinuria  Assessment & Plan:  Patient routinely following up with Nephrology.      7. Seborrheic dermatitis  Assessment & Plan:  Prescription for ketoconazole shampoo sent in.  Patient educated on how to use.    Orders:  -     ketoconazole (NIZORAL) 2 % shampoo; Apply to scalp, lather and leave in for 5 minutes, then rinse off. Use twice weekly  Dispense: 120 mL; Refill: 11    8. Screening for diabetic retinopathy  -     Ambulatory referral/consult to Optometry; Future; Expected date: 09/06/2023    9. Screening for prostate cancer  -     PSA, Screening; Future; Expected date: 09/20/2023    10. Erectile dysfunction, unspecified erectile dysfunction type  Assessment & Plan:  Patient reports good results with Viagra.  No side effects reported.  Correct use was reviewed with him.  Continue Viagra as  needed.      11. Vaccination declined  Assessment & Plan:  Patient declined pneumococcal, shingles, and flu vaccination.             -Gurwinder Olmeod Jr., MD, AAHIVS      This office note has been dictated.  This dictation has been generated using M-Modal Fluency Direct dictation; some phonetic errors may occur.         Patient Instructions   [place], que no sea antes de Sept. 6,      Future Appointments   Date Time Provider Department Center   4/27/2023  8:00 AM [facility] DEXA1 [facility] [place]   9/15/2023  8:15 AM LAB, [facility] DRAW STATION [facility] LAB [facility]   9/19/2023  8:20 AM Gurwinder Olmedo Jr., MD [facility]

## 2023-03-26 PROBLEM — N18.31 STAGE 3A CHRONIC KIDNEY DISEASE: Chronic | Status: ACTIVE | Noted: 2020-03-02

## 2023-03-26 PROBLEM — E66.09 CLASS 1 OBESITY DUE TO EXCESS CALORIES WITH SERIOUS COMORBIDITY AND BODY MASS INDEX (BMI) OF 30.0 TO 30.9 IN ADULT: Status: RESOLVED | Noted: 2022-08-15 | Resolved: 2023-03-26

## 2023-03-26 PROBLEM — Z85.828 HISTORY OF BASAL CELL CARCINOMA: Status: ACTIVE | Noted: 2018-06-14

## 2023-03-26 PROBLEM — N18.31 STAGE 3A CHRONIC KIDNEY DISEASE: Status: ACTIVE | Noted: 2020-03-02

## 2023-03-26 PROBLEM — E11.21 TYPE 2 DIABETES MELLITUS WITH DIABETIC NEPHROPATHY, WITHOUT LONG-TERM CURRENT USE OF INSULIN: Status: ACTIVE | Noted: 2020-03-02

## 2023-03-26 PROBLEM — N52.9 ERECTILE DYSFUNCTION: Chronic | Status: ACTIVE | Noted: 2023-03-26

## 2023-03-26 PROBLEM — E11.21 TYPE 2 DIABETES WITH NEPHROPATHY: Chronic | Status: ACTIVE | Noted: 2020-03-02

## 2023-03-26 PROBLEM — E78.2 MIXED DYSLIPIDEMIA: Chronic | Status: ACTIVE | Noted: 2023-03-26

## 2023-03-26 PROBLEM — E21.3 HYPERPARATHYROIDISM: Chronic | Status: ACTIVE | Noted: 2020-03-02

## 2023-03-26 PROBLEM — H72.91 PERFORATED EARDRUM, RIGHT: Status: RESOLVED | Noted: 2020-03-26 | Resolved: 2023-03-26

## 2023-03-26 PROBLEM — E66.811 CLASS 1 OBESITY DUE TO EXCESS CALORIES WITH SERIOUS COMORBIDITY AND BODY MASS INDEX (BMI) OF 30.0 TO 30.9 IN ADULT: Status: RESOLVED | Noted: 2022-08-15 | Resolved: 2023-03-26

## 2023-03-26 PROBLEM — R80.1 PERSISTENT PROTEINURIA: Chronic | Status: ACTIVE | Noted: 2023-03-26

## 2023-03-26 PROBLEM — I10 BENIGN HYPERTENSION: Chronic | Status: ACTIVE | Noted: 2023-03-26

## 2023-03-26 PROBLEM — L21.9 SEBORRHEIC DERMATITIS: Chronic | Status: ACTIVE | Noted: 2023-03-26

## 2023-03-26 PROBLEM — Z28.21 VACCINATION DECLINED: Status: ACTIVE | Noted: 2023-03-20

## 2023-03-26 PROBLEM — Z28.21 VACCINATION DECLINED: Status: ACTIVE | Noted: 2023-03-26

## 2023-03-26 PROBLEM — E11.65 TYPE 2 DIABETES MELLITUS WITH HYPERGLYCEMIA, WITHOUT LONG-TERM CURRENT USE OF INSULIN: Status: RESOLVED | Noted: 2022-08-15 | Resolved: 2023-03-26

## 2023-03-26 PROBLEM — H72.90 PERFORATED EARDRUM: Status: RESOLVED | Noted: 2020-08-13 | Resolved: 2023-03-26

## 2023-03-26 NOTE — ASSESSMENT & PLAN NOTE
Patient is currently on maximum dose of atorvastatin as well as on Lovaza.  His triglycerides remain elevated but better than previous.  Unable to go up any further medications given low LDL.  Continue current regimen.

## 2023-03-26 NOTE — ASSESSMENT & PLAN NOTE
Patient reports good results with Viagra.  No side effects reported.  Correct use was reviewed with him.  Continue Viagra as needed.

## 2023-03-26 NOTE — ASSESSMENT & PLAN NOTE
Patient seen by Nephrology last month.  Improvement in renal functions noted.  Continue management as per Nephrology recommendations.

## 2023-06-06 DIAGNOSIS — E11.65 TYPE 2 DIABETES MELLITUS WITH HYPERGLYCEMIA, WITHOUT LONG-TERM CURRENT USE OF INSULIN: ICD-10-CM

## 2023-06-06 DIAGNOSIS — E11.22 TYPE 2 DIABETES MELLITUS WITH STAGE 3B CHRONIC KIDNEY DISEASE, WITHOUT LONG-TERM CURRENT USE OF INSULIN: ICD-10-CM

## 2023-06-06 DIAGNOSIS — N18.32 TYPE 2 DIABETES MELLITUS WITH STAGE 3B CHRONIC KIDNEY DISEASE, WITHOUT LONG-TERM CURRENT USE OF INSULIN: ICD-10-CM

## 2023-06-10 RX ORDER — SEMAGLUTIDE 1.34 MG/ML
1 INJECTION, SOLUTION SUBCUTANEOUS
Qty: 4 EACH | Refills: 1 | Status: SHIPPED | OUTPATIENT
Start: 2023-06-10 | End: 2023-08-06

## 2023-06-12 ENCOUNTER — HOSPITAL ENCOUNTER (OUTPATIENT)
Dept: RADIOLOGY | Facility: CLINIC | Age: 53
Discharge: HOME | End: 2023-06-12
Attending: FAMILY MEDICINE
Payer: COMMERCIAL

## 2023-06-12 DIAGNOSIS — E21.3 HYPERPARATHYROIDISM: Chronic | ICD-10-CM

## 2023-06-12 PROCEDURE — 77080 DXA BONE DENSITY AXIAL: CPT | Mod: TC,PO

## 2023-06-12 PROCEDURE — 77080 DXA BONE DENSITY AXIAL SKELETON 1 OR MORE SITES: ICD-10-PCS | Mod: 26,,, | Performed by: INTERNAL MEDICINE

## 2023-06-12 PROCEDURE — 77080 DXA BONE DENSITY AXIAL: CPT | Mod: 26,,, | Performed by: INTERNAL MEDICINE

## 2023-08-04 DIAGNOSIS — N18.32 TYPE 2 DIABETES MELLITUS WITH STAGE 3B CHRONIC KIDNEY DISEASE, WITHOUT LONG-TERM CURRENT USE OF INSULIN: ICD-10-CM

## 2023-08-04 DIAGNOSIS — E11.65 TYPE 2 DIABETES MELLITUS WITH HYPERGLYCEMIA, WITHOUT LONG-TERM CURRENT USE OF INSULIN: ICD-10-CM

## 2023-08-04 DIAGNOSIS — E11.22 TYPE 2 DIABETES MELLITUS WITH STAGE 3B CHRONIC KIDNEY DISEASE, WITHOUT LONG-TERM CURRENT USE OF INSULIN: ICD-10-CM

## 2023-08-06 RX ORDER — SEMAGLUTIDE 1.34 MG/ML
1 INJECTION, SOLUTION SUBCUTANEOUS
Qty: 9 ML | Refills: 0 | Status: SHIPPED | OUTPATIENT
Start: 2023-08-06 | End: 2023-08-13 | Stop reason: SDUPTHER

## 2023-08-06 NOTE — TELEPHONE ENCOUNTER
Care Due:                  Date            Visit Type   Department     Provider  --------------------------------------------------------------------------------                                [facility]                              PRIMARY      MEDICINE/  Last Visit: 03-      CARE ([facility])   INTERNAL MED   Gurwinder Olmedo                              [facility]                              PRIMARY      MEDICINE/  Next Visit: 09-      CARE ([facility])   INTERNAL MED   Gurwinder Olmedo                                                            Last  Test          Frequency    Reason                     Performed    Due Date  --------------------------------------------------------------------------------    HBA1C.......  6 months...  semaglutide..............  03- 09-    Health [facility] Embedded Care Due Messages. Reference number: [id number].   8/06/2023 7:27:30 AM CDT

## 2023-08-07 NOTE — TELEPHONE ENCOUNTER
Refill Decision Note   Nicholas Nugent  is requesting a refill authorization.  Brief Assessment and Rationale for Refill:  Approve     Medication Therapy Plan:  FLOS      Comments:     Note composed:10:45 PM 08/06/2023

## 2023-08-09 ENCOUNTER — PATIENT OUTREACH (OUTPATIENT)
Dept: ADMINISTRATIVE | Facility: HOSPITAL | Age: 53
End: 2023-08-09
Payer: COMMERCIAL

## 2023-08-13 DIAGNOSIS — E11.22 TYPE 2 DIABETES MELLITUS WITH STAGE 3B CHRONIC KIDNEY DISEASE, WITHOUT LONG-TERM CURRENT USE OF INSULIN: ICD-10-CM

## 2023-08-13 DIAGNOSIS — N18.32 TYPE 2 DIABETES MELLITUS WITH STAGE 3B CHRONIC KIDNEY DISEASE, WITHOUT LONG-TERM CURRENT USE OF INSULIN: ICD-10-CM

## 2023-08-13 DIAGNOSIS — E11.65 TYPE 2 DIABETES MELLITUS WITH HYPERGLYCEMIA, WITHOUT LONG-TERM CURRENT USE OF INSULIN: ICD-10-CM

## 2023-08-13 NOTE — TELEPHONE ENCOUNTER
No care due was identified.  Health [facility] Embedded Care Due Messages. Reference number: [id number].   8/13/2023 10:14:56 AM CDT

## 2023-08-14 RX ORDER — SEMAGLUTIDE 1.34 MG/ML
1 INJECTION, SOLUTION SUBCUTANEOUS
Qty: 9 ML | Refills: 0 | Status: SHIPPED | OUTPATIENT
Start: 2023-08-14 | End: 2023-09-19 | Stop reason: SDUPTHER

## 2023-09-15 ENCOUNTER — LAB VISIT (OUTPATIENT)
Dept: LAB | Facility: HOSPITAL | Age: 53
End: 2023-09-15
Attending: FAMILY MEDICINE
Payer: COMMERCIAL

## 2023-09-15 DIAGNOSIS — E78.2 MIXED DYSLIPIDEMIA: Chronic | ICD-10-CM

## 2023-09-15 DIAGNOSIS — E11.21 TYPE 2 DIABETES MELLITUS WITH DIABETIC NEPHROPATHY, WITHOUT LONG-TERM CURRENT USE OF INSULIN: Chronic | ICD-10-CM

## 2023-09-15 DIAGNOSIS — E21.3 HYPERPARATHYROIDISM: Chronic | ICD-10-CM

## 2023-09-15 DIAGNOSIS — Z12.5 SCREENING FOR PROSTATE CANCER: ICD-10-CM

## 2023-09-15 DIAGNOSIS — I10 BENIGN HYPERTENSION: Chronic | ICD-10-CM

## 2023-09-15 LAB
25(OH)D3+25(OH)D2 SERPL-MCNC: 36 NG/ML (ref 30–96)
ALBUMIN SERPL BCP-MCNC: 4 G/DL (ref 3.5–5.2)
ALP SERPL-CCNC: 92 U/L (ref 55–135)
ALT SERPL W/O P-5'-P-CCNC: 38 U/L (ref 10–44)
ANION GAP SERPL CALC-SCNC: 10 MMOL/L (ref 8–16)
AST SERPL-CCNC: 34 U/L (ref 10–40)
BASOPHILS # BLD AUTO: 0.03 K/UL (ref 0–0.2)
BASOPHILS NFR BLD: 0.3 % (ref 0–1.9)
BILIRUB SERPL-MCNC: 1 MG/DL (ref 0.1–1)
BUN SERPL-MCNC: 21 MG/DL (ref 6–20)
CALCIUM SERPL-MCNC: 9.8 MG/DL (ref 8.7–10.5)
CHLORIDE SERPL-SCNC: 110 MMOL/L (ref 95–110)
CHOLEST SERPL-MCNC: 122 MG/DL (ref 120–199)
CHOLEST/HDLC SERPL: 5.3 {RATIO} (ref 2–5)
CO2 SERPL-SCNC: 23 MMOL/L (ref 23–29)
COMPLEXED PSA SERPL-MCNC: 0.72 NG/ML (ref 0–4)
CREAT SERPL-MCNC: 1.7 MG/DL (ref 0.5–1.4)
DIFFERENTIAL METHOD: ABNORMAL
EOSINOPHIL # BLD AUTO: 0.2 K/UL (ref 0–0.5)
EOSINOPHIL NFR BLD: 2.5 % (ref 0–8)
ERYTHROCYTE [DISTWIDTH] IN BLOOD BY AUTOMATED COUNT: 14.2 % (ref 11.5–14.5)
EST. GFR  (NO RACE VARIABLE): 47 ML/MIN/1.73 M^2
ESTIMATED AVG GLUCOSE: 143 MG/DL (ref 68–131)
GLUCOSE SERPL-MCNC: 127 MG/DL (ref 70–110)
HBA1C MFR BLD: 6.6 % (ref 4–5.6)
HCT VFR BLD AUTO: 38.5 % (ref 40–54)
HDLC SERPL-MCNC: 23 MG/DL (ref 40–75)
HDLC SERPL: 18.9 % (ref 20–50)
HGB BLD-MCNC: 12.6 G/DL (ref 14–18)
IMM GRANULOCYTES # BLD AUTO: 0.02 K/UL (ref 0–0.04)
IMM GRANULOCYTES NFR BLD AUTO: 0.2 % (ref 0–0.5)
LDLC SERPL CALC-MCNC: 42.6 MG/DL (ref 63–159)
LYMPHOCYTES # BLD AUTO: 2.7 K/UL (ref 1–4.8)
LYMPHOCYTES NFR BLD: 29.6 % (ref 18–48)
MCH RBC QN AUTO: 28.4 PG (ref 27–31)
MCHC RBC AUTO-ENTMCNC: 32.7 G/DL (ref 32–36)
MCV RBC AUTO: 87 FL (ref 82–98)
MONOCYTES # BLD AUTO: 0.6 K/UL (ref 0.3–1)
MONOCYTES NFR BLD: 6.8 % (ref 4–15)
NEUTROPHILS # BLD AUTO: 5.5 K/UL (ref 1.8–7.7)
NEUTROPHILS NFR BLD: 60.6 % (ref 38–73)
NONHDLC SERPL-MCNC: 99 MG/DL
NRBC BLD-RTO: 0 /100 WBC
PLATELET # BLD AUTO: 185 K/UL (ref 150–450)
PMV BLD AUTO: 10.5 FL (ref 9.2–12.9)
POTASSIUM SERPL-SCNC: 4.1 MMOL/L (ref 3.5–5.1)
PROT SERPL-MCNC: 8.4 G/DL (ref 6–8.4)
PTH-INTACT SERPL-MCNC: 93.6 PG/ML (ref 9–77)
RBC # BLD AUTO: 4.44 M/UL (ref 4.6–6.2)
SODIUM SERPL-SCNC: 143 MMOL/L (ref 136–145)
TRIGL SERPL-MCNC: 282 MG/DL (ref 30–150)
WBC # BLD AUTO: 9.1 K/UL (ref 3.9–12.7)

## 2023-09-15 PROCEDURE — 80061 LIPID PANEL: CPT | Performed by: FAMILY MEDICINE

## 2023-09-15 PROCEDURE — 83970 ASSAY OF PARATHORMONE: CPT | Performed by: FAMILY MEDICINE

## 2023-09-15 PROCEDURE — 80053 COMPREHEN METABOLIC PANEL: CPT | Performed by: FAMILY MEDICINE

## 2023-09-15 PROCEDURE — 85025 COMPLETE CBC W/AUTO DIFF WBC: CPT | Performed by: FAMILY MEDICINE

## 2023-09-15 PROCEDURE — 84153 ASSAY OF PSA TOTAL: CPT | Performed by: FAMILY MEDICINE

## 2023-09-15 PROCEDURE — 83036 HEMOGLOBIN GLYCOSYLATED A1C: CPT | Performed by: FAMILY MEDICINE

## 2023-09-15 PROCEDURE — 36415 COLL VENOUS BLD VENIPUNCTURE: CPT | Mod: PN | Performed by: FAMILY MEDICINE

## 2023-09-15 PROCEDURE — 82306 VITAMIN D 25 HYDROXY: CPT | Performed by: FAMILY MEDICINE

## 2023-09-19 ENCOUNTER — LAB VISIT (OUTPATIENT)
Dept: LAB | Facility: HOSPITAL | Age: 53
End: 2023-09-19
Payer: COMMERCIAL

## 2023-09-19 ENCOUNTER — OFFICE VISIT (OUTPATIENT)
Dept: FAMILY MEDICINE | Facility: CLINIC | Age: 53
End: 2023-09-19
Payer: COMMERCIAL

## 2023-09-19 VITALS
DIASTOLIC BLOOD PRESSURE: 80 MMHG | HEIGHT: 67 IN | WEIGHT: 192.44 LBS | SYSTOLIC BLOOD PRESSURE: 102 MMHG | HEART RATE: 86 BPM | OXYGEN SATURATION: 98 % | TEMPERATURE: 98 F | BODY MASS INDEX: 30.2 KG/M2

## 2023-09-19 DIAGNOSIS — N18.31 CHRONIC KIDNEY DISEASE, STAGE 3A: Chronic | ICD-10-CM

## 2023-09-19 DIAGNOSIS — Z12.11 SCREENING FOR COLORECTAL CANCER: ICD-10-CM

## 2023-09-19 DIAGNOSIS — D64.9 NORMOCYTIC ANEMIA: Chronic | ICD-10-CM

## 2023-09-19 DIAGNOSIS — D64.9 NORMOCYTIC ANEMIA: ICD-10-CM

## 2023-09-19 DIAGNOSIS — E21.3 HYPERPARATHYROIDISM: Chronic | ICD-10-CM

## 2023-09-19 DIAGNOSIS — N52.9 ERECTILE DYSFUNCTION, UNSPECIFIED ERECTILE DYSFUNCTION TYPE: Chronic | ICD-10-CM

## 2023-09-19 DIAGNOSIS — E78.2 MIXED DYSLIPIDEMIA: Chronic | ICD-10-CM

## 2023-09-19 DIAGNOSIS — E11.21 TYPE 2 DIABETES MELLITUS WITH DIABETIC NEPHROPATHY, WITHOUT LONG-TERM CURRENT USE OF INSULIN: Primary | Chronic | ICD-10-CM

## 2023-09-19 DIAGNOSIS — Z12.12 SCREENING FOR COLORECTAL CANCER: ICD-10-CM

## 2023-09-19 DIAGNOSIS — I10 BENIGN HYPERTENSION: Chronic | ICD-10-CM

## 2023-09-19 LAB
BASOPHILS # BLD AUTO: 0.02 K/UL (ref 0–0.2)
BASOPHILS NFR BLD: 0.2 % (ref 0–1.9)
DIFFERENTIAL METHOD: ABNORMAL
EOSINOPHIL # BLD AUTO: 0.2 K/UL (ref 0–0.5)
EOSINOPHIL NFR BLD: 2.3 % (ref 0–8)
ERYTHROCYTE [DISTWIDTH] IN BLOOD BY AUTOMATED COUNT: 14.2 % (ref 11.5–14.5)
FERRITIN SERPL-MCNC: 260 NG/ML (ref 20–300)
HCT VFR BLD AUTO: 38.6 % (ref 40–54)
HGB BLD-MCNC: 12.5 G/DL (ref 14–18)
IMM GRANULOCYTES # BLD AUTO: 0.02 K/UL (ref 0–0.04)
IMM GRANULOCYTES NFR BLD AUTO: 0.2 % (ref 0–0.5)
IRON SERPL-MCNC: 84 UG/DL (ref 45–160)
LYMPHOCYTES # BLD AUTO: 2.6 K/UL (ref 1–4.8)
LYMPHOCYTES NFR BLD: 28.6 % (ref 18–48)
MCH RBC QN AUTO: 28.8 PG (ref 27–31)
MCHC RBC AUTO-ENTMCNC: 32.4 G/DL (ref 32–36)
MCV RBC AUTO: 89 FL (ref 82–98)
MONOCYTES # BLD AUTO: 0.7 K/UL (ref 0.3–1)
MONOCYTES NFR BLD: 8.3 % (ref 4–15)
NEUTROPHILS # BLD AUTO: 5.4 K/UL (ref 1.8–7.7)
NEUTROPHILS NFR BLD: 60.4 % (ref 38–73)
NRBC BLD-RTO: 0 /100 WBC
PLATELET # BLD AUTO: 184 K/UL (ref 150–450)
PMV BLD AUTO: 10.9 FL (ref 9.2–12.9)
RBC # BLD AUTO: 4.34 M/UL (ref 4.6–6.2)
SATURATED IRON: 22 % (ref 20–50)
TOTAL IRON BINDING CAPACITY: 379 UG/DL (ref 250–450)
TRANSFERRIN SERPL-MCNC: 256 MG/DL (ref 200–375)
WBC # BLD AUTO: 8.96 K/UL (ref 3.9–12.7)

## 2023-09-19 PROCEDURE — 3079F DIAST BP 80-89 MM HG: CPT | Mod: CPTII,S$GLB,, | Performed by: FAMILY MEDICINE

## 2023-09-19 PROCEDURE — 3066F PR DOCUMENTATION OF TREATMENT FOR NEPHROPATHY: ICD-10-PCS | Mod: CPTII,S$GLB,, | Performed by: FAMILY MEDICINE

## 2023-09-19 PROCEDURE — 3044F HG A1C LEVEL LT 7.0%: CPT | Mod: CPTII,S$GLB,, | Performed by: FAMILY MEDICINE

## 2023-09-19 PROCEDURE — 1160F RVW MEDS BY RX/DR IN RCRD: CPT | Mod: CPTII,S$GLB,, | Performed by: FAMILY MEDICINE

## 2023-09-19 PROCEDURE — 3066F NEPHROPATHY DOC TX: CPT | Mod: CPTII,S$GLB,, | Performed by: FAMILY MEDICINE

## 2023-09-19 PROCEDURE — 99999 PR PBB SHADOW E&M-EST. PATIENT-LVL V: CPT | Mod: PBBFAC,,, | Performed by: FAMILY MEDICINE

## 2023-09-19 PROCEDURE — 3008F BODY MASS INDEX DOCD: CPT | Mod: CPTII,S$GLB,, | Performed by: FAMILY MEDICINE

## 2023-09-19 PROCEDURE — 4010F ACE/ARB THERAPY RXD/TAKEN: CPT | Mod: CPTII,S$GLB,, | Performed by: FAMILY MEDICINE

## 2023-09-19 PROCEDURE — 3008F PR BODY MASS INDEX (BMI) DOCUMENTED: ICD-10-PCS | Mod: CPTII,S$GLB,, | Performed by: FAMILY MEDICINE

## 2023-09-19 PROCEDURE — 83540 ASSAY OF IRON: CPT | Performed by: FAMILY MEDICINE

## 2023-09-19 PROCEDURE — 1159F PR MEDICATION LIST DOCUMENTED IN MEDICAL RECORD: ICD-10-PCS | Mod: CPTII,S$GLB,, | Performed by: FAMILY MEDICINE

## 2023-09-19 PROCEDURE — 3060F POS MICROALBUMINURIA REV: CPT | Mod: CPTII,S$GLB,, | Performed by: FAMILY MEDICINE

## 2023-09-19 PROCEDURE — 3060F PR POS MICROALBUMINURIA RESULT DOCUMENTED/REVIEW: ICD-10-PCS | Mod: CPTII,S$GLB,, | Performed by: FAMILY MEDICINE

## 2023-09-19 PROCEDURE — 84466 ASSAY OF TRANSFERRIN: CPT | Performed by: FAMILY MEDICINE

## 2023-09-19 PROCEDURE — 3074F SYST BP LT 130 MM HG: CPT | Mod: CPTII,S$GLB,, | Performed by: FAMILY MEDICINE

## 2023-09-19 PROCEDURE — 99999 PR PBB SHADOW E&M-EST. PATIENT-LVL V: ICD-10-PCS | Mod: PBBFAC,,, | Performed by: FAMILY MEDICINE

## 2023-09-19 PROCEDURE — 99214 PR OFFICE/OUTPT VISIT, EST, LEVL IV, 30-39 MIN: ICD-10-PCS | Mod: S$GLB,,, | Performed by: FAMILY MEDICINE

## 2023-09-19 PROCEDURE — 85025 COMPLETE CBC W/AUTO DIFF WBC: CPT | Performed by: FAMILY MEDICINE

## 2023-09-19 PROCEDURE — 1159F MED LIST DOCD IN RCRD: CPT | Mod: CPTII,S$GLB,, | Performed by: FAMILY MEDICINE

## 2023-09-19 PROCEDURE — 4010F PR ACE/ARB THEARPY RXD/TAKEN: ICD-10-PCS | Mod: CPTII,S$GLB,, | Performed by: FAMILY MEDICINE

## 2023-09-19 PROCEDURE — 36415 COLL VENOUS BLD VENIPUNCTURE: CPT | Mod: PN | Performed by: FAMILY MEDICINE

## 2023-09-19 PROCEDURE — 3079F PR MOST RECENT DIASTOLIC BLOOD PRESSURE 80-89 MM HG: ICD-10-PCS | Mod: CPTII,S$GLB,, | Performed by: FAMILY MEDICINE

## 2023-09-19 PROCEDURE — 1160F PR REVIEW ALL MEDS BY PRESCRIBER/CLIN PHARMACIST DOCUMENTED: ICD-10-PCS | Mod: CPTII,S$GLB,, | Performed by: FAMILY MEDICINE

## 2023-09-19 PROCEDURE — 82728 ASSAY OF FERRITIN: CPT | Performed by: FAMILY MEDICINE

## 2023-09-19 PROCEDURE — 99214 OFFICE O/P EST MOD 30 MIN: CPT | Mod: S$GLB,,, | Performed by: FAMILY MEDICINE

## 2023-09-19 PROCEDURE — 3072F PR LOW RISK FOR RETINOPATHY: ICD-10-PCS | Mod: CPTII,S$GLB,, | Performed by: FAMILY MEDICINE

## 2023-09-19 PROCEDURE — 3072F LOW RISK FOR RETINOPATHY: CPT | Mod: CPTII,S$GLB,, | Performed by: FAMILY MEDICINE

## 2023-09-19 PROCEDURE — 3074F PR MOST RECENT SYSTOLIC BLOOD PRESSURE < 130 MM HG: ICD-10-PCS | Mod: CPTII,S$GLB,, | Performed by: FAMILY MEDICINE

## 2023-09-19 PROCEDURE — 3044F PR MOST RECENT HEMOGLOBIN A1C LEVEL <7.0%: ICD-10-PCS | Mod: CPTII,S$GLB,, | Performed by: FAMILY MEDICINE

## 2023-09-19 RX ORDER — DAPAGLIFLOZIN 10 MG/1
10 TABLET, FILM COATED ORAL DAILY
Qty: 90 TABLET | Refills: 1 | Status: SHIPPED | OUTPATIENT
Start: 2023-09-19

## 2023-09-19 RX ORDER — SILDENAFIL 100 MG/1
100 TABLET, FILM COATED ORAL DAILY PRN
Qty: 30 TABLET | Refills: 6 | Status: SHIPPED | OUTPATIENT
Start: 2023-09-19

## 2023-09-19 RX ORDER — AMLODIPINE AND VALSARTAN 10; 320 MG/1; MG/1
1 TABLET ORAL DAILY
Qty: 90 TABLET | Refills: 3 | Status: SHIPPED | OUTPATIENT
Start: 2023-09-19 | End: 2024-09-18

## 2023-09-19 RX ORDER — ERGOCALCIFEROL 1.25 MG/1
50000 CAPSULE ORAL
Qty: 3 CAPSULE | Refills: 1 | Status: SHIPPED | OUTPATIENT
Start: 2023-09-19 | End: 2024-03-05

## 2023-09-19 RX ORDER — ATORVASTATIN CALCIUM 80 MG/1
80 TABLET, FILM COATED ORAL DAILY
Qty: 90 TABLET | Refills: 3 | Status: SHIPPED | OUTPATIENT
Start: 2023-09-19

## 2023-09-19 RX ORDER — SEMAGLUTIDE 1.34 MG/ML
1 INJECTION, SOLUTION SUBCUTANEOUS
Qty: 9 ML | Refills: 3 | Status: SHIPPED | OUTPATIENT
Start: 2023-09-19

## 2023-09-19 RX ORDER — METOPROLOL SUCCINATE 100 MG/1
TABLET, EXTENDED RELEASE ORAL
Qty: 135 TABLET | Refills: 3 | Status: SHIPPED | OUTPATIENT
Start: 2023-09-19

## 2023-09-19 NOTE — PROGRESS NOTES
Patient Name: Nicholas Nugent    : 1970  MRN: [id number]      Subjective:     Patient ID: Nicholas is a 53 y.o. male    Chief Complaint:  Diabetes    53-year-old male with diabetes, chronic kidney disease stage IIIA, hypertension, and hyperlipidemia comes in for routine follow-up on these.  Reports compliance with his medications with no side effects reported.  He reports that his sugars are staying well controlled with his fastings under 130 generally.  He denies any chest pain, palpitations, shortness of breath.  He denies any blood in the urine or stools.  He does not take any nonsteroidal anti-inflammatories.  He tries to drink plenty of fluids throughout the day.    Diabetes  He presents for his follow-up diabetic visit. He has type 2 diabetes mellitus. His disease course has been stable. Pertinent negatives for hypoglycemia include no headaches or sweats. Pertinent negatives for diabetes include no blurred vision, no chest pain, no fatigue, no foot paresthesias, no polyphagia, no polyuria, no weakness and no weight loss. Symptoms are improving. Diabetic complications include impotence. Pertinent negatives for diabetic complications include no CVA, PVD or retinopathy. Risk factors for coronary artery disease include male sex, hypertension, dyslipidemia and diabetes mellitus. Current diabetic treatments: Ozempic weekly and Farxiga. His weight is decreasing steadily. Diabetic current diet: states he does have several dietary indescretions. He rarely participates in exercise. There is no compliance with monitoring of blood glucose. An ACE inhibitor/angiotensin II receptor blocker is being taken. Eye exam is current.   Hypertension  This is a chronic problem. The current episode started more than 1 year ago. Pertinent negatives include no anxiety, blurred vision, chest pain, headaches, palpitations, peripheral edema, shortness of breath or sweats. Risk factors for coronary artery disease include male gender,  "diabetes mellitus and dyslipidemia. Past treatments include calcium channel blockers, beta blockers and angiotensin blockers. The current treatment provides moderate improvement. There are no compliance problems.  There is no history of angina, kidney disease, CAD/MI, CVA, heart failure, left ventricular hypertrophy, PVD or retinopathy. Identifiable causes of hypertension include chronic renal disease.   Hyperlipidemia  This is a chronic problem. The current episode started more than 1 year ago. Exacerbating diseases include chronic renal disease and diabetes. Pertinent negatives include no chest pain or shortness of breath. Current antihyperlipidemic treatment includes statins (and Omega-3). There are no compliance problems.  Risk factors for coronary artery disease include hypertension, male sex, dyslipidemia and diabetes mellitus.        Review of Systems   Constitutional:  Negative for fatigue, unexpected weight change and weight loss.   HENT:  Negative for ear pain.    Eyes:  Negative for blurred vision.   Respiratory:  Negative for shortness of breath.    Cardiovascular:  Negative for chest pain and palpitations.   Gastrointestinal:  Negative for abdominal pain, blood in stool and change in bowel habit.   Endocrine: Negative for polyphagia and polyuria.   Genitourinary:  Positive for impotence. Negative for dysuria, frequency and hematuria.   Integumentary:  Negative for rash.   Neurological:  Negative for weakness, numbness and headaches.   Hematological:  Negative for adenopathy.   Psychiatric/Behavioral:  Negative for suicidal ideas.         Objective:   /80 (BP Location: Right arm, Patient Position: Sitting, BP Method: Large (Manual))   Pulse 86   Temp 97.7 °F (36.5 °C) (Oral)   Ht 5' 7" (1.702 m)   Wt 87.3 kg (192 lb 7.4 oz)   SpO2 98%   BMI 30.14 kg/m²     Physical Exam  Vitals reviewed.   Constitutional:       General: He is not in acute distress.     Appearance: He is well-developed. He is " obese. He is not ill-appearing or diaphoretic.   HENT:      Head: Normocephalic.      Right Ear: External ear normal.      Left Ear: External ear normal.      Nose: Nose normal.      Mouth/Throat:      Pharynx: No oropharyngeal exudate.   Eyes:      Extraocular Movements: Extraocular movements intact.      Pupils: Pupils are equal, round, and reactive to light.   Neck:      Trachea: No tracheal deviation.   Cardiovascular:      Rate and Rhythm: Normal rate and regular rhythm.      Heart sounds: Normal heart sounds.   Pulmonary:      Effort: Pulmonary effort is normal.      Breath sounds: Normal breath sounds. No wheezing or rales.   Abdominal:      General: Bowel sounds are normal.      Palpations: Abdomen is soft. Abdomen is not rigid. There is no mass.      Tenderness: There is no abdominal tenderness. There is no guarding or rebound.   Musculoskeletal:      Cervical back: Normal range of motion and neck supple.   Lymphadenopathy:      Cervical: No cervical adenopathy.   Neurological:      Mental Status: He is alert and oriented to person, place, and time.      Gait: Gait normal.   Psychiatric:         Mood and Affect: Mood normal.         Behavior: Behavior normal.        Protective Sensation (w/ 10 gram monofilament):  Right: Intact  Left: Intact    Visual Inspection:  Normal -  Bilateral    Pedal Pulses:   Right: Present  Left: Present    Posterior Tibialis Pulses:   Right:Present  Left: Present    Component Date Value Ref Range Status    Microalbumin, Urine 09/15/2023 96.0  ug/mL Final    Creatinine, Urine 09/15/2023 97.0  23.0 - 375.0 mg/dL Final    Microalb/Creat Ratio 09/15/2023 99.0 (H)  0.0 - 30.0 ug/mg Final   Lab Visit on 09/15/2023   Component Date Value Ref Range Status    WBC 09/15/2023 9.10  3.90 - 12.70 K/uL Final    RBC 09/15/2023 4.44 (L)  4.60 - 6.20 M/uL Final    Hemoglobin 09/15/2023 12.6 (L)  14.0 - 18.0 g/dL Final    Hematocrit 09/15/2023 38.5 (L)  40.0 - 54.0 % Final    MCV 09/15/2023 87   82 - 98 fL Final    MCH 09/15/2023 28.4  27.0 - 31.0 pg Final    MCHC 09/15/2023 32.7  32.0 - 36.0 g/dL Final    RDW 09/15/2023 14.2  11.5 - 14.5 % Final    Platelets 09/15/2023 185  150 - 450 K/uL Final    MPV 09/15/2023 10.5  9.2 - 12.9 fL Final    Immature Granulocytes 09/15/2023 0.2  0.0 - 0.5 % Final    Gran # (ANC) 09/15/2023 5.5  1.8 - 7.7 K/uL Final    Immature Grans (Abs) 09/15/2023 0.02  0.00 - 0.04 K/uL Final    Comment: Mild elevation in immature granulocytes is non specific and   can be seen in a variety of conditions including stress response,   acute inflammation, trauma and pregnancy. Correlation with other   laboratory and clinical findings is essential.      Lymph # 09/15/2023 2.7  1.0 - 4.8 K/uL Final    Mono # 09/15/2023 0.6  0.3 - 1.0 K/uL Final    Eos # 09/15/2023 0.2  0.0 - 0.5 K/uL Final    Baso # 09/15/2023 0.03  0.00 - 0.20 K/uL Final    nRBC 09/15/2023 0  0 /100 WBC Final    Gran % 09/15/2023 60.6  38.0 - 73.0 % Final    Lymph % 09/15/2023 29.6  18.0 - 48.0 % Final    Mono % 09/15/2023 6.8  4.0 - 15.0 % Final    Eosinophil % 09/15/2023 2.5  0.0 - 8.0 % Final    Basophil % 09/15/2023 0.3  0.0 - 1.9 % Final    Differential Method 09/15/2023 Automated   Final    Sodium 09/15/2023 143  136 - 145 mmol/L Final    Potassium 09/15/2023 4.1  3.5 - 5.1 mmol/L Final    Chloride 09/15/2023 110  95 - 110 mmol/L Final    CO2 09/15/2023 23  23 - 29 mmol/L Final    Glucose 09/15/2023 127 (H)  70 - 110 mg/dL Final    BUN 09/15/2023 21 (H)  6 - 20 mg/dL Final    Creatinine 09/15/2023 1.7 (H)  0.5 - 1.4 mg/dL Final    Calcium 09/15/2023 9.8  8.7 - 10.5 mg/dL Final    Total Protein 09/15/2023 8.4  6.0 - 8.4 g/dL Final    Albumin 09/15/2023 4.0  3.5 - 5.2 g/dL Final    Total Bilirubin 09/15/2023 1.0  0.1 - 1.0 mg/dL Final    Comment: For infants and newborns, interpretation of results should be based  on gestational age, weight and in agreement with clinical  observations.    Premature Infant recommended  reference ranges:  Up to 24 hours.............<8.0 mg/dL  Up to 48 hours............<12.0 mg/dL  3-5 days..................<15.0 mg/dL  6-29 days.................<15.0 mg/dL      Alkaline Phosphatase 09/15/2023 92  55 - 135 U/L Final    AST 09/15/2023 34  10 - 40 U/L Final    ALT 09/15/2023 38  10 - 44 U/L Final    eGFR 09/15/2023 47 (A)  >60 mL/min/1.73 m^2 Final    Anion Gap 09/15/2023 10  8 - 16 mmol/L Final    Hemoglobin A1C 09/15/2023 6.6 (H)  4.0 - 5.6 % Final    Comment: ADA Screening Guidelines:  5.7-6.4%  Consistent with prediabetes  >or=6.5%  Consistent with diabetes    High levels of fetal hemoglobin interfere with the HbA1C  assay. Heterozygous hemoglobin variants (HbS, HgC, etc)do  not significantly interfere with this assay.   However, presence of multiple variants may affect accuracy.      Estimated Avg Glucose 09/15/2023 143 (H)  68 - 131 mg/dL Final    Cholesterol 09/15/2023 122  120 - 199 mg/dL Final    Comment: The National Cholesterol Education Program (NCEP) has set the  following guidelines (reference ranges) for Cholesterol:  Optimal.....................<200 mg/dL  Borderline High.............200-239 mg/dL  High........................> or = 240 mg/dL      Triglycerides 09/15/2023 282 (H)  30 - 150 mg/dL Final    Comment: The National Cholesterol Education Program (NCEP) has set the  following guidelines (reference values) for triglycerides:  Normal......................<150 mg/dL  Borderline High.............150-199 mg/dL  High........................200-499 mg/dL      HDL 09/15/2023 23 (L)  40 - 75 mg/dL Final    Comment: The National Cholesterol Education Program (NCEP) has set the  following guidelines (reference values) for HDL Cholesterol:  Low...............<40 mg/dL  Optimal...........>60 mg/dL      LDL Cholesterol 09/15/2023 42.6 (L)  63.0 - 159.0 mg/dL Final    Comment: The National Cholesterol Education Program (NCEP) has set the  following guidelines (reference values) for LDL  Cholesterol:  Optimal.......................<130 mg/dL  Borderline High...............130-159 mg/dL  High..........................160-189 mg/dL  Very High.....................>190 mg/dL      HDL/Cholesterol Ratio 09/15/2023 18.9 (L)  20.0 - 50.0 % Final    Total Cholesterol/HDL Ratio 09/15/2023 5.3 (H)  2.0 - 5.0 Final    Non-HDL Cholesterol 09/15/2023 99  mg/dL Final    Comment: Risk category and Non-HDL cholesterol goals:  Coronary heart disease (CHD)or equivalent (10-year risk of CHD >20%):  Non-HDL cholesterol goal     <130 mg/dL  Two or more CHD risk factors and 10-year risk of CHD <= 20%:  Non-HDL cholesterol goal     <160 mg/dL  0 to 1 CHD risk factor:  Non-HDL cholesterol goal     <190 mg/dL      PTH, Intact 09/15/2023 93.6 (H)  9.0 - 77.0 pg/mL Final    Vit D, 25-Hydroxy 09/15/2023 36  30 - 96 ng/mL Final    Comment: Vitamin D deficiency.........<10 ng/mL                              Vitamin D insufficiency......10-29 ng/mL       Vitamin D sufficiency........> or equal to 30 ng/mL  Vitamin D toxicity............>100 ng/mL      PSA, Screen 09/15/2023 0.72  0.00 - 4.00 ng/mL Final    Comment: The testing method is a chemiluminescent microparticle immunoassay   manufactured by [employer] and performed on the [employer]   or   [employer] system. Values obtained with different assay manufacturers   for   methods may be different and cannot be used interchangeably.  PSA Expected levels:  Hormonal Therapy: <0.05 ng/ml  Prostatectomy: <0.01 ng/ml  Radiation Therapy: <1.00 ng/ml         Assessment        ICD-10-CM ICD-9-CM   1. Type 2 diabetes mellitus with diabetic nephropathy, without long-term current use of insulin  E11.21 250.40     583.81   2. Chronic kidney disease, stage 3a  N18.31 585.3   3. Benign hypertension  I10 401.1   4. Mixed dyslipidemia  E78.2 272.2   5. Hyperparathyroidism  E21.3 252.00   6. Erectile dysfunction, unspecified erectile dysfunction type  N52.9 607.84   7. Normocytic anemia   D64.9 285.9   8. Screening for colorectal cancer  Z12.11 V76.51    Z12.12 V76.41         Plan:     1. Type 2 diabetes mellitus with diabetic nephropathy, without long-term current use of insulin  Overview:  Lab Results   Component Value Date    HGBA1C 6.6 (H) 09/15/2023    HGBA1C 6.2 (H) 03/13/2023    HGBA1C 6.4 (H) 09/26/2022     Lab Results   Component Value Date    LDLCALC 42.6 (L) 09/15/2023     Lab Results   Component Value Date    MICALBCREAT 99.0 (H) 09/15/2023    MICALBCREAT 193.8 (H) 06/13/2022      Eye Exam:  09- (scheduled for 10-26-23)  Foot exam:  09-    Assessment & Plan:  A1c is well controlled.    Continue current regimen of a Ozempic 1 milligram weekly and Farxiga.    Orders:  -     FARXIGA 10 mg tablet; Take 1 tablet (10 mg total) by mouth once daily.  Dispense: 90 tablet; Refill: 1  -     semaglutide (OZEMPIC) 1 mg/dose (4 mg/3 mL); Inject 1 mg into the skin every 7 days.  Dispense: 9 mL; Refill: 3  -     Comprehensive Metabolic Panel; Future; Expected date: 01/19/2024  -     Lipid Panel; Future; Expected date: 01/19/2024  -     Hemoglobin A1C; Future; Expected date: 01/19/2024    2. Chronic kidney disease, stage 3a  Overview:  Lab Results   Component Value Date    EGFRNORACEVR 47 (A) 09/15/2023    EGFRNORACEVR 51 (A) 03/13/2023    EGFRNORACEVR 51 (A) 03/13/2023      Lab Results   Component Value Date    CREATININE 1.7 (H) 09/15/2023    CREATININE 1.6 (H) 03/13/2023    CREATININE 1.6 (H) 03/13/2023       Assessment & Plan:  No significant change in renal function.  Continue avoiding nephrotoxic agents including nonsteroidal anti-inflammatories.  Encouraged good hydration.  Patient is on Farxiga to help with kidney disease progression prevention.    Orders:  -     Comprehensive Metabolic Panel; Future; Expected date: 01/19/2024    3. Benign hypertension  Overview:  BP Readings from Last 3 Encounters:   09/19/23 102/80   03/20/23 130/74   10/10/22 134/72      ACC/AHA guidelines on  blood pressure goals reviewed.  Reinforced correct way of measuring blood pressure.     Assessment & Plan:  Blood pressure at goal.  Continue current regimen.    Orders:  -     metoprolol succinate (TOPROL-XL) 100 MG 24 hr tablet; Take 1.5 tablets PO daily  Dispense: 135 tablet; Refill: 3  -     amlodipine-valsartan (EXFORGE)  mg per tablet; Take 1 tablet by mouth once daily.  Dispense: 90 tablet; Refill: 3  -     Comprehensive Metabolic Panel; Future; Expected date: 01/19/2024    4. Mixed dyslipidemia  Overview:  Lab Results   Component Value Date    CHOL 122 09/15/2023    CHOL 107 (L) 03/13/2023    CHOL 99 (L) 09/26/2022     Lab Results   Component Value Date    HDL 23 (L) 09/15/2023    HDL 27 (L) 03/13/2023    HDL 24 (L) 09/26/2022     Lab Results   Component Value Date    LDLCALC 42.6 (L) 09/15/2023    LDLCALC 23.4 (L) 03/13/2023    LDLCALC 23.2 (L) 09/26/2022     Lab Results   Component Value Date    TRIG 282 (H) 09/15/2023    TRIG 283 (H) 03/13/2023    TRIG 259 (H) 09/26/2022     Lab Results   Component Value Date    CHOLHDL 18.9 (L) 09/15/2023    CHOLHDL 25.2 03/13/2023    CHOLHDL 24.2 09/26/2022        The ASCVD Risk score (Irma DK, et al., 2019) failed to calculate for the following reasons:    The valid total cholesterol range is 130 to 320 mg/dL      Assessment & Plan:  LDL controlled.  Triglycerides remain elevated.  Will add Lovaza 4 grams daily. Can either take 2 g twice daily or 4 g once daily.   Recheck labs in 3 months. If LDL increases, will consider prior authorization for Vascepa.     Orders:  -     atorvastatin (LIPITOR) 80 MG tablet; Take 1 tablet (80 mg total) by mouth once daily.  Dispense: 90 tablet; Refill: 3  -     Comprehensive Metabolic Panel; Future; Expected date: 01/19/2024  -     Lipid Panel; Future; Expected date: 01/19/2024    5. Hyperparathyroidism  Overview:  Lab Results   Component Value Date    PTH 93.6 (H) 09/15/2023    PTH 73.3 06/13/2022    PTH 49 03/03/2020      Lab  Results   Component Value Date    [id number] 36 09/15/2023    [id number] 45 06/13/2022       Assessment & Plan:  Vitamin-D lower than previous.  Likely contributing to increasing parathyroid hormone.  Will restart monthly vitamin-D supplementation.    Orders:  -     ergocalciferol (ERGOCALCIFEROL) 50,000 unit Cap; Take 1 capsule (50,000 Units total) by mouth every 30 days.  Dispense: 3 capsule; Refill: 1  -     Vitamin D; Future; Expected date: 01/19/2024  -     Phosphorus; Future; Expected date: 03/24/2024  -     PTH, intact; Future; Expected date: 03/24/2024    6. Erectile dysfunction, unspecified erectile dysfunction type  Assessment & Plan:  Reports moderate response to sildenafil 50 mg as needed.  Will increase to 100 milligrams as needed, maximum one pill per day. Written prescription given to patient along with [employer] discount coupon.    Orders:  -     sildenafiL (VIAGRA) 100 MG tablet; Take 1 tablet (100 mg total) by mouth daily as needed for Erectile Dysfunction.  Dispense: 30 tablet; Refill: 6    7. Normocytic anemia  Overview:  Lab Results   Component Value Date    HGB 12.5 (L) 09/19/2023    HGB 12.6 (L) 09/15/2023    HGB 13.7 (L) 03/13/2023     Lab Results   Component Value Date    IRON 84 09/19/2023    FERRITIN 260 09/19/2023    TRANSFERRIN 256 09/19/2023       Assessment & Plan:  Anemia stable.  Continue to monitor    Orders:  -     CBC Auto Differential; Future; Expected date: 09/19/2023  -     Iron and TIBC; Future; Expected date: 09/19/2023  -     Ferritin; Future; Expected date: 09/19/2023  -     CBC Auto Differential; Future; Expected date: 01/19/2024  -     Iron and TIBC; Future; Expected date: 01/19/2024  -     Ferritin; Future; Expected date: 01/19/2024    8. Screening for colorectal cancer  -     Ambulatory referral/consult to Endo Procedure ; Future; Expected date: 09/20/2023  Discussed options for colorectal cancer screening.  We discussed colonoscopy verses stool testing and  risks and benefits each.   Patient elected to proceed with Colonoscopy. Order placed.          -Gurwinder Olmedo Jr., MD, AAHIVS      This office note has been dictated.  This dictation has been generated using M-Modal Fluency Direct dictation; some phonetic errors may occur.         Patient Instructions   Stop amlodipine 10 mg. Stop Valsartan 320 mg  Start amlodipine-valsartan  mg once a day  Take Vit D 50,000 once a month  Harkins can change the sildenafil to 100 mg one hour prior to sex. It works best if you have not eaten for an hour prior to taking      Future Appointments   Date Time Provider Department Center   10/26/2023  9:30 AM Ирина Delgado OD [facility] OPTOMTY [place]   2/2/2024  8:15 AM LAB, [facility] DRAW STATION [facility]   2/6/2024  9:00 AM Gurwinder Olmedo Jr., MD [facility]

## 2023-09-19 NOTE — PATIENT INSTRUCTIONS
Stop amlodipine 10 mg. Stop Valsartan 320 mg  Start amlodipine-valsartan  mg once a day  Take Vit D 50,000 once a month  Harkins can change the sildenafil to 100 mg one hour prior to sex. It works best if you have not eaten for an hour prior to taking

## 2023-09-21 ENCOUNTER — TELEPHONE (OUTPATIENT)
Dept: ENDOSCOPY | Facility: HOSPITAL | Age: 53
End: 2023-09-21

## 2023-09-21 NOTE — TELEPHONE ENCOUNTER
Contacted the patient to schedule an endoscopy procedure(s) Colonoscopy. The patient did not answer the call and left a voice message requesting a call back.

## 2023-09-24 PROBLEM — D64.9 NORMOCYTIC ANEMIA: Status: ACTIVE | Noted: 2023-09-24

## 2023-09-24 PROBLEM — D64.9 NORMOCYTIC ANEMIA: Chronic | Status: ACTIVE | Noted: 2023-09-24

## 2023-09-24 NOTE — ASSESSMENT & PLAN NOTE
LDL controlled.  Triglycerides remain elevated.  Will add Lovaza 4 grams daily. Can either take 2 g twice daily or 4 g once daily.   Recheck labs in 3 months. If LDL increases, will consider prior authorization for Vascepa.

## 2023-09-24 NOTE — ASSESSMENT & PLAN NOTE
Vitamin-D lower than previous.  Likely contributing to increasing parathyroid hormone.  Will restart monthly vitamin-D supplementation.

## 2023-09-24 NOTE — ASSESSMENT & PLAN NOTE
No significant change in renal function.  Continue avoiding nephrotoxic agents including nonsteroidal anti-inflammatories.  Encouraged good hydration.  Patient is on Farxiga to help with kidney disease progression prevention.

## 2023-09-24 NOTE — ASSESSMENT & PLAN NOTE
Reports moderate response to sildenafil 50 mg as needed.  Will increase to 100 milligrams as needed, maximum one pill per day. Written prescription given to patient along with [employer] discount coupon.

## 2023-10-20 ENCOUNTER — PATIENT OUTREACH (OUTPATIENT)
Dept: ADMINISTRATIVE | Facility: HOSPITAL | Age: 53
End: 2023-10-20
Payer: COMMERCIAL

## 2023-10-26 ENCOUNTER — OFFICE VISIT (OUTPATIENT)
Dept: OPTOMETRY | Facility: CLINIC | Age: 53
End: 2023-10-26
Payer: COMMERCIAL

## 2023-10-26 DIAGNOSIS — H52.7 REFRACTIVE ERROR: ICD-10-CM

## 2023-10-26 DIAGNOSIS — E11.9 TYPE 2 DIABETES MELLITUS WITHOUT COMPLICATION, WITHOUT LONG-TERM CURRENT USE OF INSULIN: Primary | ICD-10-CM

## 2023-10-26 DIAGNOSIS — Z13.5 SCREENING FOR DIABETIC RETINOPATHY: ICD-10-CM

## 2023-10-26 PROCEDURE — 92015 PR REFRACTION: ICD-10-PCS | Mod: S$GLB,,, | Performed by: OPTOMETRIST

## 2023-10-26 PROCEDURE — 92015 DETERMINE REFRACTIVE STATE: CPT | Mod: S$GLB,,, | Performed by: OPTOMETRIST

## 2023-10-26 PROCEDURE — 3044F PR MOST RECENT HEMOGLOBIN A1C LEVEL <7.0%: ICD-10-PCS | Mod: CPTII,S$GLB,, | Performed by: OPTOMETRIST

## 2023-10-26 PROCEDURE — 1159F MED LIST DOCD IN RCRD: CPT | Mod: CPTII,S$GLB,, | Performed by: OPTOMETRIST

## 2023-10-26 PROCEDURE — 1160F PR REVIEW ALL MEDS BY PRESCRIBER/CLIN PHARMACIST DOCUMENTED: ICD-10-PCS | Mod: CPTII,S$GLB,, | Performed by: OPTOMETRIST

## 2023-10-26 PROCEDURE — 3066F NEPHROPATHY DOC TX: CPT | Mod: CPTII,S$GLB,, | Performed by: OPTOMETRIST

## 2023-10-26 PROCEDURE — 1160F RVW MEDS BY RX/DR IN RCRD: CPT | Mod: CPTII,S$GLB,, | Performed by: OPTOMETRIST

## 2023-10-26 PROCEDURE — 3066F PR DOCUMENTATION OF TREATMENT FOR NEPHROPATHY: ICD-10-PCS | Mod: CPTII,S$GLB,, | Performed by: OPTOMETRIST

## 2023-10-26 PROCEDURE — 92014 PR EYE EXAM, EST PATIENT,COMPREHESV: ICD-10-PCS | Mod: S$GLB,,, | Performed by: OPTOMETRIST

## 2023-10-26 PROCEDURE — 2023F PR DILATED RETINAL EXAM W/O EVID OF RETINOPATHY: ICD-10-PCS | Mod: CPTII,S$GLB,, | Performed by: OPTOMETRIST

## 2023-10-26 PROCEDURE — 2023F DILAT RTA XM W/O RTNOPTHY: CPT | Mod: CPTII,S$GLB,, | Performed by: OPTOMETRIST

## 2023-10-26 PROCEDURE — 3060F PR POS MICROALBUMINURIA RESULT DOCUMENTED/REVIEW: ICD-10-PCS | Mod: CPTII,S$GLB,, | Performed by: OPTOMETRIST

## 2023-10-26 PROCEDURE — 99999 PR PBB SHADOW E&M-EST. PATIENT-LVL III: CPT | Mod: PBBFAC,,, | Performed by: OPTOMETRIST

## 2023-10-26 PROCEDURE — 3044F HG A1C LEVEL LT 7.0%: CPT | Mod: CPTII,S$GLB,, | Performed by: OPTOMETRIST

## 2023-10-26 PROCEDURE — 4010F ACE/ARB THERAPY RXD/TAKEN: CPT | Mod: CPTII,S$GLB,, | Performed by: OPTOMETRIST

## 2023-10-26 PROCEDURE — 99999 PR PBB SHADOW E&M-EST. PATIENT-LVL III: ICD-10-PCS | Mod: PBBFAC,,, | Performed by: OPTOMETRIST

## 2023-10-26 PROCEDURE — 3060F POS MICROALBUMINURIA REV: CPT | Mod: CPTII,S$GLB,, | Performed by: OPTOMETRIST

## 2023-10-26 PROCEDURE — 1159F PR MEDICATION LIST DOCUMENTED IN MEDICAL RECORD: ICD-10-PCS | Mod: CPTII,S$GLB,, | Performed by: OPTOMETRIST

## 2023-10-26 PROCEDURE — 4010F PR ACE/ARB THEARPY RXD/TAKEN: ICD-10-PCS | Mod: CPTII,S$GLB,, | Performed by: OPTOMETRIST

## 2023-10-26 PROCEDURE — 92014 COMPRE OPH EXAM EST PT 1/>: CPT | Mod: S$GLB,,, | Performed by: OPTOMETRIST

## 2023-10-26 NOTE — PROGRESS NOTES
Subjective:       Patient ID: Nicholas Nugent is a 53 y.o. male      Chief Complaint   Patient presents with    Concerns About Ocular Health    Diabetic Eye Exam     History of Present Illness  Dls: 9/6/22 Dr. Townsend     54 y/o male presents today for diabetic and hypertensive eye exam.   Pt states no changes in vision. Pt wears otc readers.      x 1 day     No tearing  No itching   No burning  No pain  No ha's  No floaters  No flashes    Eye meds  Otc gtts ou prn     Pohx:  S/p pterygium removal Bilateral     Fohx:   None     Hemoglobin A1C       Date                     Value               Ref Range             Status                09/15/2023               6.6 (H)             4.0 - 5.6 %           Final                  03/13/2023               6.2 (H)             4.0 - 5.6 %           Final                   09/26/2022               6.4 (H)             4.0 - 5.6 %           Final                   Assessment/Plan:     1. Type 2 diabetes mellitus without complication, without long-term current use of insulin  No diabetic retinopathy. Discussed with pt the effects of diabetes on vision, importance of good blood sugar control, compliance with meds, and follow up care with PCP. Return in 1 year for dilated eye exam, sooner PRN.    2. Screening for diabetic retinopathy  - Ambulatory referral/consult to Optometry    3. Refractive error  Educated patient on refractive error and discussed lens options. Dispensed updated spectacle Rx. Educated about adaptation period to new specs.    Eyeglass Final Rx       Eyeglass Final Rx         Sphere Cylinder Axis Add    Right +0.25 +0.50 180 +1.75    Left +0.25 Sphere  +1.75      Expiration Date: 10/26/2024                      Follow up in about 1 year (around 10/26/2024) for Diabetic Eye Exam.

## 2023-12-12 DIAGNOSIS — E78.2 MIXED DYSLIPIDEMIA: ICD-10-CM

## 2023-12-12 NOTE — TELEPHONE ENCOUNTER
Refill Routing Note   Medication(s) are not appropriate for processing by [facility] for the following reason(s):        No active prescription written by provider    [facility] action(s):  Defer               Appointments  past 12m or future 3m with PCP    Date Provider   Last Visit   9/19/2023 Gurwinder Olmedo Jr., MD   Next Visit   2/6/2024 Gurwinder Olmedo Jr., MD   ED visits in past 90 days: 0        Note composed:1:42 PM 12/12/2023

## 2023-12-12 NOTE — TELEPHONE ENCOUNTER
No care due was identified.  [facility] Embedded Care Due Messages. Reference number: [id number].   12/12/2023 10:56:14 AM CST

## 2023-12-14 RX ORDER — OMEGA-3-ACID ETHYL ESTERS 1 G/1
2 CAPSULE, LIQUID FILLED ORAL 2 TIMES DAILY
Qty: 360 CAPSULE | Refills: 3 | Status: SHIPPED | OUTPATIENT
Start: 2023-12-14

## 2024-01-26 ENCOUNTER — PATIENT MESSAGE (OUTPATIENT)
Dept: ENDOSCOPY | Facility: HOSPITAL | Age: 54
End: 2024-01-26
Payer: COMMERCIAL

## 2024-01-26 ENCOUNTER — TELEPHONE (OUTPATIENT)
Dept: ENDOSCOPY | Facility: HOSPITAL | Age: 54
End: 2024-01-26
Payer: COMMERCIAL

## 2024-01-26 NOTE — TELEPHONE ENCOUNTER
Contacted the patient with  ID# [id number] to schedule an endoscopy procedure(s) Colonoscopy. The patient did not answer the call and left a voice message requesting a call back.

## 2024-02-02 ENCOUNTER — LAB VISIT (OUTPATIENT)
Dept: LAB | Facility: HOSPITAL | Age: 54
End: 2024-02-02
Attending: FAMILY MEDICINE
Payer: COMMERCIAL

## 2024-02-02 DIAGNOSIS — E21.3 HYPERPARATHYROIDISM: Chronic | ICD-10-CM

## 2024-02-02 DIAGNOSIS — E11.21 TYPE 2 DIABETES MELLITUS WITH DIABETIC NEPHROPATHY, WITHOUT LONG-TERM CURRENT USE OF INSULIN: Chronic | ICD-10-CM

## 2024-02-02 DIAGNOSIS — I10 BENIGN HYPERTENSION: Chronic | ICD-10-CM

## 2024-02-02 DIAGNOSIS — E78.2 MIXED DYSLIPIDEMIA: Chronic | ICD-10-CM

## 2024-02-02 DIAGNOSIS — N18.31 CHRONIC KIDNEY DISEASE, STAGE 3A: Chronic | ICD-10-CM

## 2024-02-02 DIAGNOSIS — D64.9 NORMOCYTIC ANEMIA: Chronic | ICD-10-CM

## 2024-02-02 LAB
25(OH)D3+25(OH)D2 SERPL-MCNC: 32 NG/ML (ref 30–96)
ALBUMIN SERPL BCP-MCNC: 3.8 G/DL (ref 3.5–5.2)
ALP SERPL-CCNC: 91 U/L (ref 55–135)
ALT SERPL W/O P-5'-P-CCNC: 32 U/L (ref 10–44)
ANION GAP SERPL CALC-SCNC: 9 MMOL/L (ref 8–16)
AST SERPL-CCNC: 29 U/L (ref 10–40)
BASOPHILS # BLD AUTO: 0.02 K/UL (ref 0–0.2)
BASOPHILS NFR BLD: 0.2 % (ref 0–1.9)
BILIRUB SERPL-MCNC: 1 MG/DL (ref 0.1–1)
BUN SERPL-MCNC: 26 MG/DL (ref 6–20)
CALCIUM SERPL-MCNC: 9.4 MG/DL (ref 8.7–10.5)
CHLORIDE SERPL-SCNC: 111 MMOL/L (ref 95–110)
CHOLEST SERPL-MCNC: 107 MG/DL (ref 120–199)
CHOLEST/HDLC SERPL: 4.7 {RATIO} (ref 2–5)
CO2 SERPL-SCNC: 22 MMOL/L (ref 23–29)
CREAT SERPL-MCNC: 1.5 MG/DL (ref 0.5–1.4)
DIFFERENTIAL METHOD BLD: ABNORMAL
EOSINOPHIL # BLD AUTO: 0.1 K/UL (ref 0–0.5)
EOSINOPHIL NFR BLD: 1.6 % (ref 0–8)
ERYTHROCYTE [DISTWIDTH] IN BLOOD BY AUTOMATED COUNT: 14.3 % (ref 11.5–14.5)
EST. GFR  (NO RACE VARIABLE): 55 ML/MIN/1.73 M^2
ESTIMATED AVG GLUCOSE: 134 MG/DL (ref 68–131)
FERRITIN SERPL-MCNC: 245 NG/ML (ref 20–300)
GLUCOSE SERPL-MCNC: 123 MG/DL (ref 70–110)
HBA1C MFR BLD: 6.3 % (ref 4–5.6)
HCT VFR BLD AUTO: 39.6 % (ref 40–54)
HDLC SERPL-MCNC: 23 MG/DL (ref 40–75)
HDLC SERPL: 21.5 % (ref 20–50)
HGB BLD-MCNC: 13.2 G/DL (ref 14–18)
IMM GRANULOCYTES # BLD AUTO: 0.03 K/UL (ref 0–0.04)
IMM GRANULOCYTES NFR BLD AUTO: 0.3 % (ref 0–0.5)
IRON SERPL-MCNC: 48 UG/DL (ref 45–160)
LDLC SERPL CALC-MCNC: 29.8 MG/DL (ref 63–159)
LYMPHOCYTES # BLD AUTO: 2.8 K/UL (ref 1–4.8)
LYMPHOCYTES NFR BLD: 31.3 % (ref 18–48)
MCH RBC QN AUTO: 28.6 PG (ref 27–31)
MCHC RBC AUTO-ENTMCNC: 33.3 G/DL (ref 32–36)
MCV RBC AUTO: 86 FL (ref 82–98)
MONOCYTES # BLD AUTO: 0.6 K/UL (ref 0.3–1)
MONOCYTES NFR BLD: 6.9 % (ref 4–15)
NEUTROPHILS # BLD AUTO: 5.3 K/UL (ref 1.8–7.7)
NEUTROPHILS NFR BLD: 59.7 % (ref 38–73)
NONHDLC SERPL-MCNC: 84 MG/DL
NRBC BLD-RTO: 0 /100 WBC
PHOSPHATE SERPL-MCNC: 3.7 MG/DL (ref 2.7–4.5)
PLATELET # BLD AUTO: 209 K/UL (ref 150–450)
PMV BLD AUTO: 10.7 FL (ref 9.2–12.9)
POTASSIUM SERPL-SCNC: 3.7 MMOL/L (ref 3.5–5.1)
PROT SERPL-MCNC: 8.3 G/DL (ref 6–8.4)
PTH-INTACT SERPL-MCNC: 107 PG/ML (ref 9–77)
RBC # BLD AUTO: 4.62 M/UL (ref 4.6–6.2)
SATURATED IRON: 13 % (ref 20–50)
SODIUM SERPL-SCNC: 142 MMOL/L (ref 136–145)
TOTAL IRON BINDING CAPACITY: 361 UG/DL (ref 250–450)
TRANSFERRIN SERPL-MCNC: 244 MG/DL (ref 200–375)
TRIGL SERPL-MCNC: 271 MG/DL (ref 30–150)
WBC # BLD AUTO: 8.82 K/UL (ref 3.9–12.7)

## 2024-02-02 PROCEDURE — 82306 VITAMIN D 25 HYDROXY: CPT | Performed by: FAMILY MEDICINE

## 2024-02-02 PROCEDURE — 85025 COMPLETE CBC W/AUTO DIFF WBC: CPT | Performed by: FAMILY MEDICINE

## 2024-02-02 PROCEDURE — 36415 COLL VENOUS BLD VENIPUNCTURE: CPT | Mod: PN | Performed by: FAMILY MEDICINE

## 2024-02-02 PROCEDURE — 84100 ASSAY OF PHOSPHORUS: CPT | Performed by: FAMILY MEDICINE

## 2024-02-02 PROCEDURE — 83036 HEMOGLOBIN GLYCOSYLATED A1C: CPT | Performed by: FAMILY MEDICINE

## 2024-02-02 PROCEDURE — 80053 COMPREHEN METABOLIC PANEL: CPT | Performed by: FAMILY MEDICINE

## 2024-02-02 PROCEDURE — 83970 ASSAY OF PARATHORMONE: CPT | Performed by: FAMILY MEDICINE

## 2024-02-02 PROCEDURE — 80061 LIPID PANEL: CPT | Performed by: FAMILY MEDICINE

## 2024-02-02 PROCEDURE — 82728 ASSAY OF FERRITIN: CPT | Performed by: FAMILY MEDICINE

## 2024-02-02 PROCEDURE — 83540 ASSAY OF IRON: CPT | Performed by: FAMILY MEDICINE

## 2024-02-06 ENCOUNTER — OFFICE VISIT (OUTPATIENT)
Dept: FAMILY MEDICINE | Facility: CLINIC | Age: 54
End: 2024-02-06
Payer: COMMERCIAL

## 2024-02-06 VITALS
BODY MASS INDEX: 30.2 KG/M2 | HEIGHT: 67 IN | WEIGHT: 192.44 LBS | OXYGEN SATURATION: 97 % | TEMPERATURE: 98 F | SYSTOLIC BLOOD PRESSURE: 112 MMHG | HEART RATE: 80 BPM | DIASTOLIC BLOOD PRESSURE: 84 MMHG

## 2024-02-06 DIAGNOSIS — Z12.11 SCREENING FOR COLORECTAL CANCER: ICD-10-CM

## 2024-02-06 DIAGNOSIS — Z12.5 SCREENING FOR PROSTATE CANCER: ICD-10-CM

## 2024-02-06 DIAGNOSIS — R80.1 PERSISTENT PROTEINURIA: Chronic | ICD-10-CM

## 2024-02-06 DIAGNOSIS — E78.2 MIXED DYSLIPIDEMIA: Chronic | ICD-10-CM

## 2024-02-06 DIAGNOSIS — N18.31 CHRONIC KIDNEY DISEASE, STAGE 3A: Chronic | ICD-10-CM

## 2024-02-06 DIAGNOSIS — Z12.12 SCREENING FOR COLORECTAL CANCER: ICD-10-CM

## 2024-02-06 DIAGNOSIS — I10 BENIGN HYPERTENSION: Chronic | ICD-10-CM

## 2024-02-06 DIAGNOSIS — E11.21 TYPE 2 DIABETES MELLITUS WITH DIABETIC NEPHROPATHY, WITHOUT LONG-TERM CURRENT USE OF INSULIN: Primary | Chronic | ICD-10-CM

## 2024-02-06 DIAGNOSIS — E21.3 HYPERPARATHYROIDISM: Chronic | ICD-10-CM

## 2024-02-06 LAB
CREAT UR-MCNC: 125.3 MG/DL (ref 23–375)
PROT UR-MCNC: 62 MG/DL
PROT/CREAT UR: 0.49 MG/G{CREAT} (ref 0–0.2)

## 2024-02-06 PROCEDURE — 82570 ASSAY OF URINE CREATININE: CPT | Performed by: FAMILY MEDICINE

## 2024-02-06 PROCEDURE — 99214 OFFICE O/P EST MOD 30 MIN: CPT | Mod: S$GLB,,, | Performed by: FAMILY MEDICINE

## 2024-02-06 PROCEDURE — 3072F LOW RISK FOR RETINOPATHY: CPT | Mod: CPTII,S$GLB,, | Performed by: FAMILY MEDICINE

## 2024-02-06 PROCEDURE — 1159F MED LIST DOCD IN RCRD: CPT | Mod: CPTII,S$GLB,, | Performed by: FAMILY MEDICINE

## 2024-02-06 PROCEDURE — 3074F SYST BP LT 130 MM HG: CPT | Mod: CPTII,S$GLB,, | Performed by: FAMILY MEDICINE

## 2024-02-06 PROCEDURE — 99999 PR PBB SHADOW E&M-EST. PATIENT-LVL IV: CPT | Mod: PBBFAC,,, | Performed by: FAMILY MEDICINE

## 2024-02-06 PROCEDURE — 1160F RVW MEDS BY RX/DR IN RCRD: CPT | Mod: CPTII,S$GLB,, | Performed by: FAMILY MEDICINE

## 2024-02-06 PROCEDURE — 3079F DIAST BP 80-89 MM HG: CPT | Mod: CPTII,S$GLB,, | Performed by: FAMILY MEDICINE

## 2024-02-06 PROCEDURE — 3008F BODY MASS INDEX DOCD: CPT | Mod: CPTII,S$GLB,, | Performed by: FAMILY MEDICINE

## 2024-02-06 PROCEDURE — 3044F HG A1C LEVEL LT 7.0%: CPT | Mod: CPTII,S$GLB,, | Performed by: FAMILY MEDICINE

## 2024-02-06 NOTE — PROGRESS NOTES
Patient Name: Nicholas Nugent    : 1970  MRN: [id number]      Subjective:     Patient ID: Nicholas is a 53 y.o. male    Chief Complaint:  Diabetes    53 year old male presents for follow up on chronic conditions.   Reports not checking sugars at home. Reports no recent medication. Pharmacy was out of stock of Ozempic for 3 weeks, but was finally able to get it, and took a dose on     Diabetes  He presents for his follow-up diabetic visit. He has type 2 diabetes mellitus. His disease course has been stable. Pertinent negatives for hypoglycemia include no headaches or sweats. Pertinent negatives for diabetes include no blurred vision, no chest pain, no fatigue, no foot paresthesias, no polyphagia, no polyuria, no weakness and no weight loss. Symptoms are improving. Diabetic complications include impotence. Pertinent negatives for diabetic complications include no CVA, PVD or retinopathy. Risk factors for coronary artery disease include male sex, hypertension, dyslipidemia and diabetes mellitus. Current diabetic treatments: Ozempic weekly and Farxiga. His weight is decreasing steadily. Diabetic current diet: states he does have several dietary indescretions. He rarely participates in exercise. There is no compliance with monitoring of blood glucose. An ACE inhibitor/angiotensin II receptor blocker is being taken. Eye exam is current.   Hypertension  This is a chronic problem. The current episode started more than 1 year ago. Pertinent negatives include no anxiety, blurred vision, chest pain, headaches, palpitations, peripheral edema, shortness of breath or sweats. Risk factors for coronary artery disease include male gender, diabetes mellitus and dyslipidemia. Past treatments include calcium channel blockers, beta blockers and angiotensin blockers. The current treatment provides moderate improvement. There are no compliance problems.  There is no history of angina, kidney disease, CAD/MI, CVA, heart failure,  "left ventricular hypertrophy, PVD or retinopathy. Identifiable causes of hypertension include chronic renal disease.   Hyperlipidemia  This is a chronic problem. The current episode started more than 1 year ago. Exacerbating diseases include chronic renal disease and diabetes. Pertinent negatives include no chest pain or shortness of breath. Current antihyperlipidemic treatment includes statins (and Omega-3). There are no compliance problems.  Risk factors for coronary artery disease include hypertension, male sex, dyslipidemia and diabetes mellitus.        Review of Systems   Constitutional:  Negative for fatigue and weight loss.   Eyes:  Negative for blurred vision.   Respiratory:  Negative for shortness of breath.    Cardiovascular:  Negative for chest pain and palpitations.   Endocrine: Negative for polyphagia and polyuria.   Genitourinary:  Positive for impotence.   Neurological:  Negative for weakness and headaches.        Objective:   /84 (BP Location: Right arm, Patient Position: Sitting, BP Method: Medium (Manual))   Pulse 80   Temp 97.8 °F (36.6 °C) (Oral)   Ht 5' 7" (1.702 m)   Wt 87.3 kg (192 lb 7.4 oz)   SpO2 97%   BMI 30.14 kg/m²     Physical Exam  Vitals reviewed.   Constitutional:       General: He is not in acute distress.     Appearance: He is well-developed. He is obese. He is not ill-appearing or diaphoretic.   HENT:      Head: Normocephalic.      Right Ear: External ear normal.      Left Ear: External ear normal.      Nose: Nose normal.      Mouth/Throat:      Pharynx: No oropharyngeal exudate.   Eyes:      Extraocular Movements: Extraocular movements intact.      Pupils: Pupils are equal, round, and reactive to light.   Neck:      Trachea: No tracheal deviation.   Cardiovascular:      Rate and Rhythm: Normal rate and regular rhythm.      Heart sounds: Normal heart sounds.   Pulmonary:      Effort: Pulmonary effort is normal.      Breath sounds: Normal breath sounds. No wheezing or " rales.   Abdominal:      General: Bowel sounds are normal.      Palpations: Abdomen is soft. Abdomen is not rigid. There is no mass.      Tenderness: There is no abdominal tenderness. There is no guarding or rebound.   Musculoskeletal:      Cervical back: Normal range of motion and neck supple.   Lymphadenopathy:      Cervical: No cervical adenopathy.   Neurological:      Mental Status: He is alert and oriented to person, place, and time.      Gait: Gait normal.   Psychiatric:         Mood and Affect: Mood normal.         Behavior: Behavior normal.       Office Visit on 02/06/2024   Component Date Value Ref Range Status    Protein, Urine Random 02/06/2024 62  mg/dL Final    Creatinine, Urine 02/06/2024 125.3  23.0 - 375.0 mg/dL Final    Prot/Creat Ratio, Urine 02/06/2024 0.49 (H)  0.00 - 0.20 Final   Lab Visit on 02/02/2024   Component Date Value Ref Range Status    Sodium 02/02/2024 142  136 - 145 mmol/L Final    Potassium 02/02/2024 3.7  3.5 - 5.1 mmol/L Final    Chloride 02/02/2024 111 (H)  95 - 110 mmol/L Final    CO2 02/02/2024 22 (L)  23 - 29 mmol/L Final    Glucose 02/02/2024 123 (H)  70 - 110 mg/dL Final    BUN 02/02/2024 26 (H)  6 - 20 mg/dL Final    Creatinine 02/02/2024 1.5 (H)  0.5 - 1.4 mg/dL Final    Calcium 02/02/2024 9.4  8.7 - 10.5 mg/dL Final    Total Protein 02/02/2024 8.3  6.0 - 8.4 g/dL Final    Albumin 02/02/2024 3.8  3.5 - 5.2 g/dL Final    Total Bilirubin 02/02/2024 1.0  0.1 - 1.0 mg/dL Final    Comment: For infants and newborns, interpretation of results should be based  on gestational age, weight and in agreement with clinical  observations.    Premature Infant recommended reference ranges:  Up to 24 hours.............<8.0 mg/dL  Up to 48 hours............<12.0 mg/dL  3-5 days..................<15.0 mg/dL  6-29 days.................<15.0 mg/dL      Alkaline Phosphatase 02/02/2024 91  55 - 135 U/L Final    AST 02/02/2024 29  10 - 40 U/L Final    ALT 02/02/2024 32  10 - 44 U/L Final    eGFR  02/02/2024 55 (A)  >60 mL/min/1.73 m^2 Final    Anion Gap 02/02/2024 9  8 - 16 mmol/L Final    Cholesterol 02/02/2024 107 (L)  120 - 199 mg/dL Final    Comment: The National Cholesterol Education Program (NCEP) has set the  following guidelines (reference ranges) for Cholesterol:  Optimal.....................<200 mg/dL  Borderline High.............200-239 mg/dL  High........................> or = 240 mg/dL      Triglycerides 02/02/2024 271 (H)  30 - 150 mg/dL Final    Comment: The National Cholesterol Education Program (NCEP) has set the  following guidelines (reference values) for triglycerides:  Normal......................<150 mg/dL  Borderline High.............150-199 mg/dL  High........................200-499 mg/dL      HDL 02/02/2024 23 (L)  40 - 75 mg/dL Final    Comment: The National Cholesterol Education Program (NCEP) has set the  following guidelines (reference values) for HDL Cholesterol:  Low...............<40 mg/dL  Optimal...........>60 mg/dL      LDL Cholesterol 02/02/2024 29.8 (L)  63.0 - 159.0 mg/dL Final    Comment: The National Cholesterol Education Program (NCEP) has set the  following guidelines (reference values) for LDL Cholesterol:  Optimal.......................<130 mg/dL  Borderline High...............130-159 mg/dL  High..........................160-189 mg/dL  Very High.....................>190 mg/dL      HDL/Cholesterol Ratio 02/02/2024 21.5  20.0 - 50.0 % Final    Total Cholesterol/HDL Ratio 02/02/2024 4.7  2.0 - 5.0 Final    Non-HDL Cholesterol 02/02/2024 84  mg/dL Final    Comment: Risk category and Non-HDL cholesterol goals:  Coronary heart disease (CHD)or equivalent (10-year risk of CHD >20%):  Non-HDL cholesterol goal     <130 mg/dL  Two or more CHD risk factors and 10-year risk of CHD <= 20%:  Non-HDL cholesterol goal     <160 mg/dL  0 to 1 CHD risk factor:  Non-HDL cholesterol goal     <190 mg/dL      Hemoglobin A1C 02/02/2024 6.3 (H)  4.0 - 5.6 % Final    Comment: ADA Screening  Guidelines:  5.7-6.4%  Consistent with prediabetes  >or=6.5%  Consistent with diabetes    High levels of fetal hemoglobin interfere with the HbA1C  assay. Heterozygous hemoglobin variants (HbS, HgC, etc)do  not significantly interfere with this assay.   However, presence of multiple variants may affect accuracy.      Estimated Avg Glucose 02/02/2024 134 (H)  68 - 131 mg/dL Final    Vit D, 25-Hydroxy 02/02/2024 32  30 - 96 ng/mL Final    Comment: Vitamin D deficiency.........<10 ng/mL                              Vitamin D insufficiency......10-29 ng/mL       Vitamin D sufficiency........> or equal to 30 ng/mL  Vitamin D toxicity............>100 ng/mL      WBC 02/02/2024 8.82  3.90 - 12.70 K/uL Final    RBC 02/02/2024 4.62  4.60 - 6.20 M/uL Final    Hemoglobin 02/02/2024 13.2 (L)  14.0 - 18.0 g/dL Final    Hematocrit 02/02/2024 39.6 (L)  40.0 - 54.0 % Final    MCV 02/02/2024 86  82 - 98 fL Final    MCH 02/02/2024 28.6  27.0 - 31.0 pg Final    MCHC 02/02/2024 33.3  32.0 - 36.0 g/dL Final    RDW 02/02/2024 14.3  11.5 - 14.5 % Final    Platelets 02/02/2024 209  150 - 450 K/uL Final    MPV 02/02/2024 10.7  9.2 - 12.9 fL Final    Immature Granulocytes 02/02/2024 0.3  0.0 - 0.5 % Final    Gran # (ANC) 02/02/2024 5.3  1.8 - 7.7 K/uL Final    Immature Grans (Abs) 02/02/2024 0.03  0.00 - 0.04 K/uL Final    Comment: Mild elevation in immature granulocytes is non specific and   can be seen in a variety of conditions including stress response,   acute inflammation, trauma and pregnancy. Correlation with other   laboratory and clinical findings is essential.      Lymph # 02/02/2024 2.8  1.0 - 4.8 K/uL Final    Mono # 02/02/2024 0.6  0.3 - 1.0 K/uL Final    Eos # 02/02/2024 0.1  0.0 - 0.5 K/uL Final    Baso # 02/02/2024 0.02  0.00 - 0.20 K/uL Final    nRBC 02/02/2024 0  0 /100 WBC Final    Gran % 02/02/2024 59.7  38.0 - 73.0 % Final    Lymph % 02/02/2024 31.3  18.0 - 48.0 % Final    Mono % 02/02/2024 6.9  4.0 - 15.0 % Final     Eosinophil % 02/02/2024 1.6  0.0 - 8.0 % Final    Basophil % 02/02/2024 0.2  0.0 - 1.9 % Final    Differential Method 02/02/2024 Automated   Final    Iron 02/02/2024 48  45 - 160 ug/dL Final    Transferrin 02/02/2024 244  200 - 375 mg/dL Final    TIBC 02/02/2024 361  250 - 450 ug/dL Final    Saturated Iron 02/02/2024 13 (L)  20 - 50 % Final    Ferritin 02/02/2024 245  20.0 - 300.0 ng/mL Final    Phosphorus 02/02/2024 3.7  2.7 - 4.5 mg/dL Final    PTH, Intact 02/02/2024 107.0 (H)  9.0 - 77.0 pg/mL Final        Assessment        ICD-10-CM ICD-9-CM   1. Type 2 diabetes mellitus with diabetic nephropathy, without long-term current use of insulin  E11.21 250.40     583.81   2. Chronic kidney disease, stage 3a  N18.31 585.3   3. Mixed dyslipidemia  E78.2 272.2   4. Benign hypertension  I10 401.1   5. Persistent proteinuria  R80.1 791.0   6. Screening for prostate cancer  Z12.5 V76.44   7. Screening for colorectal cancer  Z12.11 V76.51    Z12.12 V76.41   8. Hyperparathyroidism  E21.3 252.00         Plan:     1. Type 2 diabetes mellitus with diabetic nephropathy, without long-term current use of insulin  Overview:  Lab Results   Component Value Date    HGBA1C 6.3 (H) 02/02/2024    HGBA1C 6.6 (H) 09/15/2023    HGBA1C 6.2 (H) 03/13/2023     Lab Results   Component Value Date    LDLCALC 29.8 (L) 02/02/2024     Lab Results   Component Value Date    MICALBCREAT 99.0 (H) 09/15/2023    MICALBCREAT 193.8 (H) 06/13/2022      Eye Exam:  09- (scheduled for 10-26-23)  Foot exam:  09-    Assessment & Plan:  A1c at goal.  Continue current management.    Orders:  -     Microalbumin/creatinine urine ratio; Future; Expected date: 08/06/2024  -     CBC auto differential; Future; Expected date: 08/06/2024  -     Comprehensive metabolic panel; Future; Expected date: 08/06/2024  -     Hemoglobin A1c; Future; Expected date: 08/06/2024  -     Lipid panel; Future; Expected date: 08/06/2024    2. Chronic kidney disease, stage  3a  Overview:  Lab Results   Component Value Date    EGFRNORACEVR 55 (A) 02/02/2024    EGFRNORACEVR 47 (A) 09/15/2023    EGFRNORACEVR 51 (A) 03/13/2023    EGFRNORACEVR 51 (A) 03/13/2023      Lab Results   Component Value Date    CREATININE 1.5 (H) 02/02/2024    CREATININE 1.7 (H) 09/15/2023    CREATININE 1.6 (H) 03/13/2023    CREATININE 1.6 (H) 03/13/2023       Assessment & Plan:  Renal function stable  Continue monitoring    Orders:  -     Microalbumin/creatinine urine ratio; Future; Expected date: 08/06/2024  -     CBC auto differential; Future; Expected date: 08/06/2024  -     Comprehensive metabolic panel; Future; Expected date: 08/06/2024    3. Mixed dyslipidemia  Overview:  Lab Results   Component Value Date    CHOL 122 09/15/2023    CHOL 107 (L) 03/13/2023    CHOL 99 (L) 09/26/2022     Lab Results   Component Value Date    HDL 23 (L) 09/15/2023    HDL 27 (L) 03/13/2023    HDL 24 (L) 09/26/2022     Lab Results   Component Value Date    LDLCALC 42.6 (L) 09/15/2023    LDLCALC 23.4 (L) 03/13/2023    LDLCALC 23.2 (L) 09/26/2022     Lab Results   Component Value Date    TRIG 282 (H) 09/15/2023    TRIG 283 (H) 03/13/2023    TRIG 259 (H) 09/26/2022     Lab Results   Component Value Date    CHOLHDL 18.9 (L) 09/15/2023    CHOLHDL 25.2 03/13/2023    CHOLHDL 24.2 09/26/2022        The ASCVD Risk score (Irma DK, et al., 2019) failed to calculate for the following reasons:    The valid total cholesterol range is 130 to 320 mg/dL      Orders:  -     Lipid panel; Future; Expected date: 08/06/2024    4. Benign hypertension  Overview:  BP Readings from Last 3 Encounters:   09/19/23 102/80   03/20/23 130/74   10/10/22 134/72      ACC/AHA guidelines on blood pressure goals reviewed.  Reinforced correct way of measuring blood pressure.     Orders:  -     Microalbumin/creatinine urine ratio; Future; Expected date: 08/06/2024  -     Comprehensive metabolic panel; Future; Expected date: 08/06/2024  -     Lipid panel; Future;  Expected date: 08/06/2024    5. Persistent proteinuria  -     Protein / creatinine ratio, urine  -     Microalbumin/creatinine urine ratio; Future; Expected date: 08/06/2024    6. Screening for prostate cancer  -     PSA, Screening; Future; Expected date: 08/06/2024  Risks and benefits of prostate cancer screening reviewed, and will proceed with screening    7. Screening for colorectal cancer  -     Cologuard Screening (Multitarget Stool DNA); Future; Expected date: 02/06/2024  Reviewed colonoscopy vs stool testing for colorectal cancer screening.  Patient agrees to Cologuard for colon cancer screening.  Video on how to collect shown (https://www.[employer]/using-and-returning-your-cologuard-kit)    8. Hyperparathyroidism  Overview:  Lab Results   Component Value Date    .0 (H) 02/02/2024    PTH 93.6 (H) 09/15/2023    PTH 73.3 06/13/2022      Lab Results   Component Value Date    [id number] 32 02/02/2024    [id number] 36 09/15/2023    [id number] 45 06/13/2022       Assessment & Plan:  Continue vitamin D                 -Gurwinder Olmedo Jr., MD, AAHIVS      This office note has been dictated.  This dictation has been generated using M-Modal Fluency Direct dictation; some phonetic errors may occur.         There are no Patient Instructions on file for this visit.    Follow Up  No follow-ups on file.    Future Appointments   Date Time Provider Department Center   8/9/2024  8:00 AM LAB, [facility] DRAW STATION [facility]   8/13/2024  9:00 AM Gurwinder Olmedo Jr., MD [facility]

## 2024-02-28 LAB — NONINV COLON CA DNA+OCC BLD SCRN STL QL: NORMAL

## 2024-03-02 DIAGNOSIS — E21.3 HYPERPARATHYROIDISM: Chronic | ICD-10-CM

## 2024-03-02 NOTE — TELEPHONE ENCOUNTER
No care due was identified.  Health [facility] Embedded Care Due Messages. Reference number: [id number].   3/02/2024 8:55:50 AM CST

## 2024-03-04 NOTE — TELEPHONE ENCOUNTER
Refill Routing Note   Medication(s) are not appropriate for processing by [facility] for the following reason(s):        Outside of protocol    [facility] action(s):  Route               Appointments  past 12m or future 3m with PCP    Date Provider   Last Visit   2/6/2024 Gurwinder Olmedo Jr., MD   Next Visit   8/13/2024 Gurwinder Olmedo Jr., MD   ED visits in past 90 days: 0        Note composed:9:03 AM 03/04/2024

## 2024-03-05 RX ORDER — ERGOCALCIFEROL 1.25 MG/1
50000 CAPSULE ORAL
Qty: 3 CAPSULE | Refills: 1 | Status: SHIPPED | OUTPATIENT
Start: 2024-03-05

## 2024-03-25 LAB — NONINV COLON CA DNA+OCC BLD SCRN STL QL: NEGATIVE

## 2024-03-31 DIAGNOSIS — L21.9 SEBORRHEIC DERMATITIS: Chronic | ICD-10-CM

## 2024-04-03 RX ORDER — KETOCONAZOLE 20 MG/ML
SHAMPOO, SUSPENSION TOPICAL
Qty: 120 ML | Refills: 11 | Status: SHIPPED | OUTPATIENT
Start: 2024-04-03

## 2024-05-29 DIAGNOSIS — E11.21 TYPE 2 DIABETES MELLITUS WITH DIABETIC NEPHROPATHY, WITHOUT LONG-TERM CURRENT USE OF INSULIN: Chronic | ICD-10-CM

## 2024-05-29 NOTE — TELEPHONE ENCOUNTER
Refill Routing Note   Medication(s) are not appropriate for processing by [facility] for the following reason(s):        Required labs abnormal    [facility] action(s):  Defer        Medication Therapy Plan: FLOS 08/09/24      Appointments  past 12m or future 3m with PCP    Date Provider   Last Visit   2/6/2024 Gurwinder Olmedo Jr., MD   Next Visit   8/13/2024 Gurwinder Olmedo Jr., MD   ED visits in past 90 days: 0        Note composed:5:44 AM 05/29/2024

## 2024-05-29 NOTE — TELEPHONE ENCOUNTER
Care Due:                  Date            Visit Type   Department     Provider  --------------------------------------------------------------------------------                                [facility] FAM[facility]                              PRIMARY      MEDICINE/  Last Visit: 02-      CARE ([facility])   INTERNAL MED   Gurwinder Olmedo                              [facility]                              PRIMARY      MEDICINE/  Next Visit: 08-      CARE ([facility])   INTERNAL MED   Gurwinder Olmedo                                                            Last  Test          Frequency    Reason                     Performed    Due Date  --------------------------------------------------------------------------------    HBA1C.......  6 months...  FARXIGA, semaglutide.....  02- 08-    Health [facility] Embedded Care Due Messages. Reference number: [id number].   5/29/2024 12:17:29 AM CDT

## 2024-05-30 RX ORDER — DAPAGLIFLOZIN 10 MG/1
10 TABLET, FILM COATED ORAL
Qty: 90 TABLET | Refills: 0 | Status: SHIPPED | OUTPATIENT
Start: 2024-05-30

## 2024-07-28 DIAGNOSIS — E11.21 TYPE 2 DIABETES MELLITUS WITH DIABETIC NEPHROPATHY, WITHOUT LONG-TERM CURRENT USE OF INSULIN: Chronic | ICD-10-CM

## 2024-07-28 DIAGNOSIS — E21.3 HYPERPARATHYROIDISM: Chronic | ICD-10-CM

## 2024-07-28 NOTE — TELEPHONE ENCOUNTER
No care due was identified.  [facility] Embedded Care Due Messages. Reference number: [id number].   7/28/2024 2:06:45 PM CDT

## 2024-07-29 RX ORDER — SEMAGLUTIDE 1.34 MG/ML
1 INJECTION, SOLUTION SUBCUTANEOUS
Qty: 9 ML | Refills: 0 | Status: SHIPPED | OUTPATIENT
Start: 2024-07-29

## 2024-07-29 NOTE — TELEPHONE ENCOUNTER
Refill Routing Note   Medication(s) are not appropriate for processing by [facility] for the following reason(s):        Outside of protocol    [facility] action(s):  Approve  Route               Appointments  past 12m or future 3m with PCP    Date Provider   Last Visit   2/6/2024 Gurwinder Olmedo Jr., MD   Next Visit   8/13/2024 Gurwinder Olmedo Jr., MD   ED visits in past 90 days: 0        Note composed:5:24 PM 07/29/2024

## 2024-07-31 RX ORDER — ERGOCALCIFEROL 1.25 MG/1
50000 CAPSULE ORAL
Qty: 3 CAPSULE | Refills: 1 | Status: SHIPPED | OUTPATIENT
Start: 2024-07-31

## 2024-08-08 ENCOUNTER — PATIENT OUTREACH (OUTPATIENT)
Dept: ADMINISTRATIVE | Facility: HOSPITAL | Age: 54
End: 2024-08-08
Payer: COMMERCIAL

## 2024-08-09 ENCOUNTER — LAB VISIT (OUTPATIENT)
Dept: LAB | Facility: HOSPITAL | Age: 54
End: 2024-08-09
Attending: FAMILY MEDICINE
Payer: COMMERCIAL

## 2024-08-09 DIAGNOSIS — E11.21 TYPE 2 DIABETES MELLITUS WITH DIABETIC NEPHROPATHY, WITHOUT LONG-TERM CURRENT USE OF INSULIN: Chronic | ICD-10-CM

## 2024-08-09 DIAGNOSIS — Z12.5 SCREENING FOR PROSTATE CANCER: ICD-10-CM

## 2024-08-09 DIAGNOSIS — E78.2 MIXED DYSLIPIDEMIA: Chronic | ICD-10-CM

## 2024-08-09 DIAGNOSIS — N18.31 CHRONIC KIDNEY DISEASE, STAGE 3A: Chronic | ICD-10-CM

## 2024-08-09 DIAGNOSIS — I10 BENIGN HYPERTENSION: Chronic | ICD-10-CM

## 2024-08-09 DIAGNOSIS — R80.1 PERSISTENT PROTEINURIA: Chronic | ICD-10-CM

## 2024-08-09 LAB
ALBUMIN SERPL BCP-MCNC: 4 G/DL (ref 3.5–5.2)
ALBUMIN/CREAT UR: 131.8 UG/MG (ref 0–30)
ALP SERPL-CCNC: 91 U/L (ref 55–135)
ALT SERPL W/O P-5'-P-CCNC: 28 U/L (ref 10–44)
ANION GAP SERPL CALC-SCNC: 10 MMOL/L (ref 8–16)
AST SERPL-CCNC: 31 U/L (ref 10–40)
BASOPHILS # BLD AUTO: 0.02 K/UL (ref 0–0.2)
BASOPHILS NFR BLD: 0.2 % (ref 0–1.9)
BILIRUB SERPL-MCNC: 0.8 MG/DL (ref 0.1–1)
BUN SERPL-MCNC: 22 MG/DL (ref 6–20)
CALCIUM SERPL-MCNC: 9.9 MG/DL (ref 8.7–10.5)
CHLORIDE SERPL-SCNC: 106 MMOL/L (ref 95–110)
CHOLEST SERPL-MCNC: 101 MG/DL (ref 120–199)
CHOLEST/HDLC SERPL: 3.9 {RATIO} (ref 2–5)
CO2 SERPL-SCNC: 24 MMOL/L (ref 23–29)
COMPLEXED PSA SERPL-MCNC: 0.76 NG/ML (ref 0–4)
CREAT SERPL-MCNC: 1.9 MG/DL (ref 0.5–1.4)
CREAT UR-MCNC: 129 MG/DL (ref 23–375)
DIFFERENTIAL METHOD BLD: ABNORMAL
EOSINOPHIL # BLD AUTO: 0.2 K/UL (ref 0–0.5)
EOSINOPHIL NFR BLD: 2.2 % (ref 0–8)
ERYTHROCYTE [DISTWIDTH] IN BLOOD BY AUTOMATED COUNT: 13.5 % (ref 11.5–14.5)
EST. GFR  (NO RACE VARIABLE): 41 ML/MIN/1.73 M^2
ESTIMATED AVG GLUCOSE: 134 MG/DL (ref 68–131)
GLUCOSE SERPL-MCNC: 143 MG/DL (ref 70–110)
HBA1C MFR BLD: 6.3 % (ref 4–5.6)
HCT VFR BLD AUTO: 39.1 % (ref 40–54)
HDLC SERPL-MCNC: 26 MG/DL (ref 40–75)
HDLC SERPL: 25.7 % (ref 20–50)
HGB BLD-MCNC: 12.6 G/DL (ref 14–18)
IMM GRANULOCYTES # BLD AUTO: 0.02 K/UL (ref 0–0.04)
IMM GRANULOCYTES NFR BLD AUTO: 0.2 % (ref 0–0.5)
LDLC SERPL CALC-MCNC: 21.2 MG/DL (ref 63–159)
LYMPHOCYTES # BLD AUTO: 2.5 K/UL (ref 1–4.8)
LYMPHOCYTES NFR BLD: 29.6 % (ref 18–48)
MCH RBC QN AUTO: 28.3 PG (ref 27–31)
MCHC RBC AUTO-ENTMCNC: 32.2 G/DL (ref 32–36)
MCV RBC AUTO: 88 FL (ref 82–98)
MICROALBUMIN UR DL<=1MG/L-MCNC: 170 UG/ML
MONOCYTES # BLD AUTO: 0.7 K/UL (ref 0.3–1)
MONOCYTES NFR BLD: 8.7 % (ref 4–15)
NEUTROPHILS # BLD AUTO: 4.9 K/UL (ref 1.8–7.7)
NEUTROPHILS NFR BLD: 59.1 % (ref 38–73)
NONHDLC SERPL-MCNC: 75 MG/DL
NRBC BLD-RTO: 0 /100 WBC
PLATELET # BLD AUTO: 203 K/UL (ref 150–450)
PMV BLD AUTO: 10.7 FL (ref 9.2–12.9)
POTASSIUM SERPL-SCNC: 3.8 MMOL/L (ref 3.5–5.1)
PROT SERPL-MCNC: 8.1 G/DL (ref 6–8.4)
RBC # BLD AUTO: 4.45 M/UL (ref 4.6–6.2)
SODIUM SERPL-SCNC: 140 MMOL/L (ref 136–145)
TRIGL SERPL-MCNC: 269 MG/DL (ref 30–150)
WBC # BLD AUTO: 8.31 K/UL (ref 3.9–12.7)

## 2024-08-09 PROCEDURE — 84153 ASSAY OF PSA TOTAL: CPT | Performed by: FAMILY MEDICINE

## 2024-08-09 PROCEDURE — 80053 COMPREHEN METABOLIC PANEL: CPT | Performed by: FAMILY MEDICINE

## 2024-08-09 PROCEDURE — 85025 COMPLETE CBC W/AUTO DIFF WBC: CPT | Performed by: FAMILY MEDICINE

## 2024-08-09 PROCEDURE — 36415 COLL VENOUS BLD VENIPUNCTURE: CPT | Mod: PN | Performed by: FAMILY MEDICINE

## 2024-08-09 PROCEDURE — 82043 UR ALBUMIN QUANTITATIVE: CPT | Performed by: FAMILY MEDICINE

## 2024-08-09 PROCEDURE — 82570 ASSAY OF URINE CREATININE: CPT | Performed by: FAMILY MEDICINE

## 2024-08-09 PROCEDURE — 83036 HEMOGLOBIN GLYCOSYLATED A1C: CPT | Performed by: FAMILY MEDICINE

## 2024-08-09 PROCEDURE — 80061 LIPID PANEL: CPT | Performed by: FAMILY MEDICINE

## 2024-08-13 ENCOUNTER — OFFICE VISIT (OUTPATIENT)
Dept: FAMILY MEDICINE | Facility: CLINIC | Age: 54
End: 2024-08-13
Payer: COMMERCIAL

## 2024-08-13 VITALS
RESPIRATION RATE: 18 BRPM | BODY MASS INDEX: 30.49 KG/M2 | DIASTOLIC BLOOD PRESSURE: 82 MMHG | WEIGHT: 194.25 LBS | OXYGEN SATURATION: 99 % | SYSTOLIC BLOOD PRESSURE: 118 MMHG | HEART RATE: 80 BPM | HEIGHT: 67 IN

## 2024-08-13 DIAGNOSIS — N18.31 CHRONIC KIDNEY DISEASE, STAGE 3A: Chronic | ICD-10-CM

## 2024-08-13 DIAGNOSIS — E21.3 HYPERPARATHYROIDISM: Chronic | ICD-10-CM

## 2024-08-13 DIAGNOSIS — Z28.21 VACCINATION REFUSED BY PATIENT: ICD-10-CM

## 2024-08-13 DIAGNOSIS — D64.9 NORMOCYTIC ANEMIA: Chronic | ICD-10-CM

## 2024-08-13 DIAGNOSIS — L21.9 SEBORRHEIC DERMATITIS: Chronic | ICD-10-CM

## 2024-08-13 DIAGNOSIS — E78.2 MIXED DYSLIPIDEMIA: Chronic | ICD-10-CM

## 2024-08-13 DIAGNOSIS — E11.21 TYPE 2 DIABETES MELLITUS WITH DIABETIC NEPHROPATHY, WITHOUT LONG-TERM CURRENT USE OF INSULIN: Primary | Chronic | ICD-10-CM

## 2024-08-13 DIAGNOSIS — I10 BENIGN HYPERTENSION: Chronic | ICD-10-CM

## 2024-08-13 PROCEDURE — 99999 PR PBB SHADOW E&M-EST. PATIENT-LVL IV: CPT | Mod: PBBFAC,,, | Performed by: FAMILY MEDICINE

## 2024-08-13 PROCEDURE — 3066F NEPHROPATHY DOC TX: CPT | Mod: CPTII,S$GLB,, | Performed by: FAMILY MEDICINE

## 2024-08-13 PROCEDURE — 3074F SYST BP LT 130 MM HG: CPT | Mod: CPTII,S$GLB,, | Performed by: FAMILY MEDICINE

## 2024-08-13 PROCEDURE — 3044F HG A1C LEVEL LT 7.0%: CPT | Mod: CPTII,S$GLB,, | Performed by: FAMILY MEDICINE

## 2024-08-13 PROCEDURE — 1159F MED LIST DOCD IN RCRD: CPT | Mod: CPTII,S$GLB,, | Performed by: FAMILY MEDICINE

## 2024-08-13 PROCEDURE — 1160F RVW MEDS BY RX/DR IN RCRD: CPT | Mod: CPTII,S$GLB,, | Performed by: FAMILY MEDICINE

## 2024-08-13 PROCEDURE — 3072F LOW RISK FOR RETINOPATHY: CPT | Mod: CPTII,S$GLB,, | Performed by: FAMILY MEDICINE

## 2024-08-13 PROCEDURE — 3079F DIAST BP 80-89 MM HG: CPT | Mod: CPTII,S$GLB,, | Performed by: FAMILY MEDICINE

## 2024-08-13 PROCEDURE — 3060F POS MICROALBUMINURIA REV: CPT | Mod: CPTII,S$GLB,, | Performed by: FAMILY MEDICINE

## 2024-08-13 PROCEDURE — 4010F ACE/ARB THERAPY RXD/TAKEN: CPT | Mod: CPTII,S$GLB,, | Performed by: FAMILY MEDICINE

## 2024-08-13 PROCEDURE — 99214 OFFICE O/P EST MOD 30 MIN: CPT | Mod: S$GLB,,, | Performed by: FAMILY MEDICINE

## 2024-08-13 PROCEDURE — G2211 COMPLEX E/M VISIT ADD ON: HCPCS | Mod: S$GLB,,, | Performed by: FAMILY MEDICINE

## 2024-08-13 PROCEDURE — 3008F BODY MASS INDEX DOCD: CPT | Mod: CPTII,S$GLB,, | Performed by: FAMILY MEDICINE

## 2024-08-13 RX ORDER — AMLODIPINE AND VALSARTAN 10; 320 MG/1; MG/1
1 TABLET ORAL DAILY
Qty: 90 TABLET | Refills: 3 | Status: SHIPPED | OUTPATIENT
Start: 2024-08-13 | End: 2025-08-13

## 2024-08-13 RX ORDER — SEMAGLUTIDE 1.34 MG/ML
1 INJECTION, SOLUTION SUBCUTANEOUS
Qty: 9 ML | Refills: 0 | Status: SHIPPED | OUTPATIENT
Start: 2024-08-13

## 2024-08-13 RX ORDER — METOPROLOL SUCCINATE 100 MG/1
TABLET, EXTENDED RELEASE ORAL
Qty: 135 TABLET | Refills: 3 | Status: SHIPPED | OUTPATIENT
Start: 2024-08-13

## 2024-08-13 RX ORDER — OMEGA-3-ACID ETHYL ESTERS 1 G/1
2 CAPSULE, LIQUID FILLED ORAL 2 TIMES DAILY
Qty: 360 CAPSULE | Refills: 3 | Status: SHIPPED | OUTPATIENT
Start: 2024-08-13

## 2024-08-13 RX ORDER — KETOCONAZOLE 20 MG/ML
SHAMPOO, SUSPENSION TOPICAL
Qty: 120 ML | Refills: 11 | Status: SHIPPED | OUTPATIENT
Start: 2024-08-13

## 2024-08-13 RX ORDER — ATORVASTATIN CALCIUM 80 MG/1
80 TABLET, FILM COATED ORAL DAILY
Qty: 90 TABLET | Refills: 3 | Status: SHIPPED | OUTPATIENT
Start: 2024-08-13

## 2024-08-13 NOTE — PROGRESS NOTES
Patient Name: Nicholas Nugent    : 1970  MRN: [id number]      Subjective:     Patient ID: Nicholas is a 54 y.o. male    Chief Complaint:  Diabetes, Hypertension, and Hyperlipidemia    54 year old male presents for follow up diabetes, hypertension, hyperlipidemia, and chronic kidney disease.   He reports that he was started on Farxiga by nephrologist but he had stopped it because it keeps causing genital symptoms of candidiasis.    Diabetes  He presents for his follow-up diabetic visit. He has type 2 diabetes mellitus. His disease course has been stable. Pertinent negatives for hypoglycemia include no headaches or sweats. Pertinent negatives for diabetes include no blurred vision, no chest pain, no fatigue, no foot paresthesias, no polyphagia, no polyuria, no weakness and no weight loss. Symptoms are improving. Pertinent negatives for diabetic complications include no CVA, PVD or retinopathy. Risk factors for coronary artery disease include male sex, hypertension, dyslipidemia and diabetes mellitus. Current diabetic treatments: Ozempic weekly and Farxiga. His weight is decreasing steadily. Diabetic current diet: states he does have several dietary indescretions. He rarely participates in exercise. There is no compliance with monitoring of blood glucose. An ACE inhibitor/angiotensin II receptor blocker is being taken.   Hypertension  This is a chronic problem. The current episode started more than 1 year ago. The problem is controlled. Pertinent negatives include no anxiety, blurred vision, chest pain, headaches, palpitations, peripheral edema, shortness of breath or sweats. Risk factors for coronary artery disease include male gender, diabetes mellitus, dyslipidemia and obesity. Past treatments include calcium channel blockers, beta blockers and angiotensin blockers. The current treatment provides moderate improvement. There are no compliance problems.  There is no history of angina, kidney disease, CAD/MI, CVA,  "heart failure, left ventricular hypertrophy, PVD or retinopathy. Identifiable causes of hypertension include chronic renal disease.   Hyperlipidemia  This is a chronic problem. The current episode started more than 1 year ago. Exacerbating diseases include chronic renal disease and diabetes. Pertinent negatives include no chest pain or shortness of breath. Current antihyperlipidemic treatment includes statins (and Omega-3). There are no compliance problems.  Risk factors for coronary artery disease include hypertension, male sex, dyslipidemia and diabetes mellitus.        Review of Systems   Constitutional:  Negative for fatigue and weight loss.   Eyes:  Negative for blurred vision.   Respiratory:  Negative for shortness of breath.    Cardiovascular:  Negative for chest pain and palpitations.   Endocrine: Negative for polyphagia and polyuria.   Neurological:  Negative for weakness and headaches.        Objective:   /82 (BP Location: Left arm, Patient Position: Sitting, BP Method: Large (Manual))   Pulse 80   Resp 18   Ht 5' 7" (1.702 m)   Wt 88.1 kg (194 lb 3.6 oz)   SpO2 99%   BMI 30.42 kg/m²     Physical Exam  Vitals reviewed.   Constitutional:       General: He is not in acute distress.     Appearance: He is well-developed. He is obese. He is not ill-appearing or diaphoretic.   HENT:      Head: Normocephalic.      Right Ear: External ear normal.      Left Ear: External ear normal.      Nose: Nose normal.      Mouth/Throat:      Pharynx: No oropharyngeal exudate.   Eyes:      Extraocular Movements: Extraocular movements intact.      Pupils: Pupils are equal, round, and reactive to light.   Neck:      Trachea: No tracheal deviation.   Cardiovascular:      Rate and Rhythm: Normal rate and regular rhythm.      Heart sounds: Normal heart sounds.   Pulmonary:      Effort: Pulmonary effort is normal.      Breath sounds: Normal breath sounds. No wheezing or rales.   Abdominal:      General: Bowel sounds are " normal.      Palpations: Abdomen is soft. Abdomen is not rigid. There is no mass.      Tenderness: There is no abdominal tenderness. There is no guarding or rebound.   Musculoskeletal:      Cervical back: Normal range of motion and neck supple.   Lymphadenopathy:      Cervical: No cervical adenopathy.   Neurological:      Mental Status: He is alert and oriented to person, place, and time.      Gait: Gait normal.   Psychiatric:         Mood and Affect: Mood normal.         Behavior: Behavior normal.          Protective Sensation (w/ 10 gram monofilament):  Right: Intact  Left: Intact    Visual Inspection:  Normal -  Bilateral    Pedal Pulses:   Right: Present  Left: Present    Posterior Tibialis Pulses:   Right:Present  Left: Present    Lab Visit on 08/09/2024   Component Date Value Ref Range Status    Microalbumin, Urine 08/09/2024 170.0  ug/mL Final    Creatinine, Urine 08/09/2024 129.0  23.0 - 375.0 mg/dL Final    Microalb/Creat Ratio 08/09/2024 131.8 (H)  0.0 - 30.0 ug/mg Final    WBC 08/09/2024 8.31  3.90 - 12.70 K/uL Final    RBC 08/09/2024 4.45 (L)  4.60 - 6.20 M/uL Final    Hemoglobin 08/09/2024 12.6 (L)  14.0 - 18.0 g/dL Final    Hematocrit 08/09/2024 39.1 (L)  40.0 - 54.0 % Final    MCV 08/09/2024 88  82 - 98 fL Final    MCH 08/09/2024 28.3  27.0 - 31.0 pg Final    MCHC 08/09/2024 32.2  32.0 - 36.0 g/dL Final    RDW 08/09/2024 13.5  11.5 - 14.5 % Final    Platelets 08/09/2024 203  150 - 450 K/uL Final    MPV 08/09/2024 10.7  9.2 - 12.9 fL Final    Immature Granulocytes 08/09/2024 0.2  0.0 - 0.5 % Final    Gran # (ANC) 08/09/2024 4.9  1.8 - 7.7 K/uL Final    Immature Grans (Abs) 08/09/2024 0.02  0.00 - 0.04 K/uL Final    Comment: Mild elevation in immature granulocytes is non specific and   can be seen in a variety of conditions including stress response,   acute inflammation, trauma and pregnancy. Correlation with other   laboratory and clinical findings is essential.      Lymph # 08/09/2024 2.5  1.0 - 4.8  K/uL Final    Mono # 08/09/2024 0.7  0.3 - 1.0 K/uL Final    Eos # 08/09/2024 0.2  0.0 - 0.5 K/uL Final    Baso # 08/09/2024 0.02  0.00 - 0.20 K/uL Final    nRBC 08/09/2024 0  0 /100 WBC Final    Gran % 08/09/2024 59.1  38.0 - 73.0 % Final    Lymph % 08/09/2024 29.6  18.0 - 48.0 % Final    Mono % 08/09/2024 8.7  4.0 - 15.0 % Final    Eosinophil % 08/09/2024 2.2  0.0 - 8.0 % Final    Basophil % 08/09/2024 0.2  0.0 - 1.9 % Final    Differential Method 08/09/2024 Automated   Final    Sodium 08/09/2024 140  136 - 145 mmol/L Final    Potassium 08/09/2024 3.8  3.5 - 5.1 mmol/L Final    Chloride 08/09/2024 106  95 - 110 mmol/L Final    CO2 08/09/2024 24  23 - 29 mmol/L Final    Glucose 08/09/2024 143 (H)  70 - 110 mg/dL Final    BUN 08/09/2024 22 (H)  6 - 20 mg/dL Final    Creatinine 08/09/2024 1.9 (H)  0.5 - 1.4 mg/dL Final    Calcium 08/09/2024 9.9  8.7 - 10.5 mg/dL Final    Total Protein 08/09/2024 8.1  6.0 - 8.4 g/dL Final    Albumin 08/09/2024 4.0  3.5 - 5.2 g/dL Final    Total Bilirubin 08/09/2024 0.8  0.1 - 1.0 mg/dL Final    Comment: For infants and newborns, interpretation of results should be based  on gestational age, weight and in agreement with clinical  observations.    Premature Infant recommended reference ranges:  Up to 24 hours.............<8.0 mg/dL  Up to 48 hours............<12.0 mg/dL  3-5 days..................<15.0 mg/dL  6-29 days.................<15.0 mg/dL      Alkaline Phosphatase 08/09/2024 91  55 - 135 U/L Final    AST 08/09/2024 31  10 - 40 U/L Final    ALT 08/09/2024 28  10 - 44 U/L Final    eGFR 08/09/2024 41 (A)  >60 mL/min/1.73 m^2 Final    Anion Gap 08/09/2024 10  8 - 16 mmol/L Final    Hemoglobin A1C 08/09/2024 6.3 (H)  4.0 - 5.6 % Final    Comment: ADA Screening Guidelines:  5.7-6.4%  Consistent with prediabetes  >or=6.5%  Consistent with diabetes    High levels of fetal hemoglobin interfere with the HbA1C  assay. Heterozygous hemoglobin variants (HbS, HgC, etc)do  not significantly  interfere with this assay.   However, presence of multiple variants may affect accuracy.      Estimated Avg Glucose 08/09/2024 134 (H)  68 - 131 mg/dL Final    Cholesterol 08/09/2024 101 (L)  120 - 199 mg/dL Final    Comment: The National Cholesterol Education Program (NCEP) has set the  following guidelines (reference ranges) for Cholesterol:  Optimal.....................<200 mg/dL  Borderline High.............200-239 mg/dL  High........................> or = 240 mg/dL      Triglycerides 08/09/2024 269 (H)  30 - 150 mg/dL Final    Comment: The National Cholesterol Education Program (NCEP) has set the  following guidelines (reference values) for triglycerides:  Normal......................<150 mg/dL  Borderline High.............150-199 mg/dL  High........................200-499 mg/dL      HDL 08/09/2024 26 (L)  40 - 75 mg/dL Final    Comment: The National Cholesterol Education Program (NCEP) has set the  following guidelines (reference values) for HDL Cholesterol:  Low...............<40 mg/dL  Optimal...........>60 mg/dL      LDL Cholesterol 08/09/2024 21.2 (L)  63.0 - 159.0 mg/dL Final    Comment: The National Cholesterol Education Program (NCEP) has set the  following guidelines (reference values) for LDL Cholesterol:  Optimal.......................<130 mg/dL  Borderline High...............130-159 mg/dL  High..........................160-189 mg/dL  Very High.....................>190 mg/dL      HDL/Cholesterol Ratio 08/09/2024 25.7  20.0 - 50.0 % Final    Total Cholesterol/HDL Ratio 08/09/2024 3.9  2.0 - 5.0 Final    Non-HDL Cholesterol 08/09/2024 75  mg/dL Final    Comment: Risk category and Non-HDL cholesterol goals:  Coronary heart disease (CHD)or equivalent (10-year risk of CHD >20%):  Non-HDL cholesterol goal     <130 mg/dL  Two or more CHD risk factors and 10-year risk of CHD <= 20%:  Non-HDL cholesterol goal     <160 mg/dL  0 to 1 CHD risk factor:  Non-HDL cholesterol goal     <190 mg/dL      PSA, Screen  08/09/2024 0.76  0.00 - 4.00 ng/mL Final    Comment: The testing method is a chemiluminescent microparticle immunoassay   manufactured by [employer] and performed on the    or   [employer] system. Values obtained with different assay manufacturers   for   methods may be different and cannot be used interchangeably.  PSA Expected levels:  Hormonal Therapy: <0.05 ng/ml  Prostatectomy: <0.01 ng/ml  Radiation Therapy: <1.00 ng/ml         Assessment        ICD-10-CM ICD-9-CM   1. Type 2 diabetes mellitus with diabetic nephropathy, without long-term current use of insulin  E11.21 250.40     583.81   2. Chronic kidney disease, stage 3a  N18.31 585.3   3. Benign hypertension  I10 401.1   4. Mixed dyslipidemia  E78.2 272.2   5. Normocytic anemia  D64.9 285.9   6. Hyperparathyroidism  E21.3 252.00   7. Seborrheic dermatitis  L21.9 690.10   8. Vaccination refused by patient  Z28.21 V64.06         Plan:     1. Type 2 diabetes mellitus with diabetic nephropathy, without long-term current use of insulin  Overview:  Lab Results   Component Value Date    HGBA1C 6.3 (H) 08/09/2024    HGBA1C 6.3 (H) 02/02/2024    HGBA1C 6.6 (H) 09/15/2023     Lab Results   Component Value Date    LDLCALC 21.2 (L) 08/09/2024     Lab Results   Component Value Date    MICALBCREAT 131.8 (H) 08/09/2024    MICALBCREAT 99.0 (H) 09/15/2023      Eye Exam:  10-  Foot exam:  08-    Assessment & Plan:  A1c is at goal.  Will switch Farxiga Jardiance to see if this causes the symptoms of candidiasis.  Continue Ozempic.    Orders:  -     empagliflozin (JARDIANCE) 10 mg tablet; Take 1 tablet (10 mg total) by mouth once daily.  Dispense: 30 tablet; Refill: 0  -     semaglutide (OZEMPIC) 1 mg/dose (4 mg/3 mL); Inject 1 mg into the skin every 7 days.  Dispense: 9 mL; Refill: 0  -     Comprehensive Metabolic Panel; Future; Expected date: 02/13/2025  -     Lipid Panel; Future; Expected date: 02/13/2025  -     Hemoglobin A1C; Future;  Expected date: 02/13/2025  -     Microalbumin/Creatinine Ratio, Urine; Future; Expected date: 02/13/2025  -     Ambulatory referral/consult to Optometry; Future; Expected date: 10/26/2024    2. Chronic kidney disease, stage 3a  Overview:  Lab Results   Component Value Date    EGFRNORACEVR 41 (A) 08/09/2024    EGFRNORACEVR 44 (L) 07/01/2024    EGFRNORACEVR 55 (A) 02/02/2024      Lab Results   Component Value Date    CREATININE 1.9 (H) 08/09/2024    CREATININE 1.78 (H) 07/01/2024    CREATININE 1.5 (H) 02/02/2024       Orders:  -     empagliflozin (JARDIANCE) 10 mg tablet; Take 1 tablet (10 mg total) by mouth once daily.  Dispense: 30 tablet; Refill: 0  -     CBC Auto Differential; Future; Expected date: 02/13/2025  -     Comprehensive Metabolic Panel; Future; Expected date: 02/13/2025    3. Benign hypertension  Assessment & Plan:  Fair blood pressure control.  Continue current regimen.    Orders:  -     amlodipine-valsartan (EXFORGE)  mg per tablet; Take 1 tablet by mouth once daily.  Dispense: 90 tablet; Refill: 3  -     metoprolol succinate (TOPROL-XL) 100 MG 24 hr tablet; Take 1.5 tablets PO daily  Dispense: 135 tablet; Refill: 3  -     Comprehensive Metabolic Panel; Future; Expected date: 02/13/2025  -     Lipid Panel; Future; Expected date: 02/13/2025    4. Mixed dyslipidemia  Overview:  Lab Results   Component Value Date    CHOL 101 (L) 08/09/2024    CHOL 107 (L) 02/02/2024    CHOL 122 09/15/2023     Lab Results   Component Value Date    HDL 26 (L) 08/09/2024    HDL 23 (L) 02/02/2024    HDL 23 (L) 09/15/2023     Lab Results   Component Value Date    LDLCALC 21.2 (L) 08/09/2024    LDLCALC 29.8 (L) 02/02/2024    LDLCALC 42.6 (L) 09/15/2023     Lab Results   Component Value Date    TRIG 269 (H) 08/09/2024    TRIG 271 (H) 02/02/2024    TRIG 282 (H) 09/15/2023     Lab Results   Component Value Date    CHOLHDL 25.7 08/09/2024    CHOLHDL 21.5 02/02/2024    CHOLHDL 18.9 (L) 09/15/2023        The ASCVD Risk score  "(Irma PENA, et al., 2019) failed to calculate for the following reasons:    The valid total cholesterol range is 130 to 320 mg/dL      Assessment & Plan:  Triglycerides remain elevated.  Will add Lovaza two atorvastatin regimen.  Recheck lipids in four weeks.    Orders:  -     atorvastatin (LIPITOR) 80 MG tablet; Take 1 tablet (80 mg total) by mouth once daily.  Dispense: 90 tablet; Refill: 3  -     omega-3 acid ethyl esters (LOVAZA) 1 gram capsule; Take 2 capsules (2 g total) by mouth 2 (two) times daily.  Dispense: 360 capsule; Refill: 3  -     Comprehensive Metabolic Panel; Future; Expected date: 02/13/2025  -     Lipid Panel; Future; Expected date: 02/13/2025    5. Normocytic anemia  Overview:  Lab Results   Component Value Date    HGB 12.6 (L) 08/09/2024    HGB 13.2 (L) 02/02/2024    HGB 12.5 (L) 09/19/2023      Lab Results   Component Value Date    HCT 39.1 (L) 08/09/2024    HCT 39.6 (L) 02/02/2024    HCT 38.6 (L) 09/19/2023     Lab Results   Component Value Date    MCV 88 08/09/2024    MCV 86 02/02/2024    MCV 89 09/19/2023    No results found for: "RETIC"    Lab Results   Component Value Date    IRON 48 02/02/2024    IRON 84 09/19/2023      Lab Results   Component Value Date    FESATURATED 13 (L) 02/02/2024    FESATURATED 22 09/19/2023      Lab Results   Component Value Date    TIBC 361 02/02/2024    TIBC 379 09/19/2023      Lab Results   Component Value Date    FERRITIN 245 02/02/2024    FERRITIN 260 09/19/2023     Lab Results   Component Value Date    TRANSFERRIN 244 02/02/2024    TRANSFERRIN 256 09/19/2023    No results found for: "SEDRATE" No results found for: "CRP"       Assessment & Plan:  Blood count stable.  Continue monitoring.    Orders:  -     CBC Auto Differential; Future; Expected date: 02/13/2025  -     Iron and TIBC; Future; Expected date: 02/13/2025  -     Ferritin; Future; Expected date: 02/13/2025  -     Comprehensive Metabolic Panel; Future; Expected date: 02/13/2025    6. " Hyperparathyroidism  Overview:  Lab Results   Component Value Date    .0 (H) 02/02/2024    PTH 93.6 (H) 09/15/2023    PTH 73.3 06/13/2022      Lab Results   Component Value Date    ZALUUSBT88LS 32 02/02/2024    AZXJIZAW24JX 36 09/15/2023    WMBTQGUG68XV 45 06/13/2022       Assessment & Plan:  Continue vitamin-D.  Continue monitoring PTH.      7. Seborrheic dermatitis  Assessment & Plan:  Ketoconazole shampoo refilled.    Orders:  -     ketoconazole (NIZORAL) 2 % shampoo; Apply to scalp, lather and leave in for 5 minutes, then rinse off. Use twice weekly  Dispense: 120 mL; Refill: 11    8. Vaccination refused by patient  Assessment & Plan:  Patient declines shingles and pneumococcal vaccination               -Gurwinder Olmedo Jr., MD, AAHIVS      This office note has been dictated.  This dictation has been generated using M-Modal Fluency Direct dictation; some phonetic errors may occur.         There are no Patient Instructions on file for this visit.      Follow up in about 6 months (around 2/13/2025) for Diabetes, Hypertension, Lipids, Chronic Kidney Disease (fasting labs a week prior).   Future Appointments   Date Time Provider Department Center   12/12/2024  3:00 PM Ирина Delgado OD [facility] OPTOMTY [place]   2/7/2025  8:15 AM LAB, [facility] DRAW STATION [facility] LAB [facility]   2/13/2025  9:00 AM Gurwinder Olmedo Jr., MD [facility]

## 2024-08-17 NOTE — ASSESSMENT & PLAN NOTE
A1c is at goal.  Will switch Farxiga Jardiance to see if this causes the symptoms of candidiasis.  Continue Ozempic.

## 2024-08-17 NOTE — ASSESSMENT & PLAN NOTE
Triglycerides remain elevated.  Will add Lovaza two atorvastatin regimen.  Recheck lipids in four weeks.

## 2024-09-17 DIAGNOSIS — E11.21 TYPE 2 DIABETES MELLITUS WITH DIABETIC NEPHROPATHY, WITHOUT LONG-TERM CURRENT USE OF INSULIN: Chronic | ICD-10-CM

## 2024-09-17 DIAGNOSIS — N18.31 CHRONIC KIDNEY DISEASE, STAGE 3A: Chronic | ICD-10-CM

## 2024-09-17 NOTE — TELEPHONE ENCOUNTER
No care due was identified.  Health [facility] Embedded Care Due Messages. Reference number: [id number].   9/17/2024 12:07:07 AM CDT

## 2024-09-17 NOTE — TELEPHONE ENCOUNTER
Refill Routing Note   Medication(s) are not appropriate for processing by [facility] for the following reason(s):        Required labs abnormal    [facility] action(s):  Defer             Pharmacist review requested: Yes     Appointments  past 12m or future 3m with PCP    Date Provider   Last Visit   8/13/2024 Gurwinder Olmedo Jr., MD   Next Visit   2/13/2025 Gurwinder Olmedo Jr., MD   ED visits in past 90 days: 0        Note composed:5:08 PM 09/17/2024

## 2024-09-18 RX ORDER — EMPAGLIFLOZIN 10 MG/1
10 TABLET, FILM COATED ORAL
Qty: 90 TABLET | Refills: 1 | Status: SHIPPED | OUTPATIENT
Start: 2024-09-18

## 2024-11-06 DIAGNOSIS — E11.9 TYPE 2 DIABETES MELLITUS WITHOUT COMPLICATION, UNSPECIFIED WHETHER LONG TERM INSULIN USE: ICD-10-CM

## 2024-12-12 ENCOUNTER — OFFICE VISIT (OUTPATIENT)
Dept: OPTOMETRY | Facility: CLINIC | Age: 54
End: 2024-12-12
Payer: COMMERCIAL

## 2024-12-12 DIAGNOSIS — E11.9 TYPE 2 DIABETES MELLITUS WITHOUT COMPLICATION, WITHOUT LONG-TERM CURRENT USE OF INSULIN: Primary | ICD-10-CM

## 2024-12-12 DIAGNOSIS — H52.7 REFRACTIVE ERROR: ICD-10-CM

## 2024-12-12 DIAGNOSIS — E11.21 TYPE 2 DIABETES MELLITUS WITH DIABETIC NEPHROPATHY, WITHOUT LONG-TERM CURRENT USE OF INSULIN: Chronic | ICD-10-CM

## 2024-12-12 PROCEDURE — 99999 PR PBB SHADOW E&M-EST. PATIENT-LVL III: CPT | Mod: PBBFAC,,, | Performed by: OPTOMETRIST

## 2024-12-12 PROCEDURE — 3060F POS MICROALBUMINURIA REV: CPT | Mod: CPTII,S$GLB,, | Performed by: OPTOMETRIST

## 2024-12-12 PROCEDURE — 3044F HG A1C LEVEL LT 7.0%: CPT | Mod: CPTII,S$GLB,, | Performed by: OPTOMETRIST

## 2024-12-12 PROCEDURE — 1160F RVW MEDS BY RX/DR IN RCRD: CPT | Mod: CPTII,S$GLB,, | Performed by: OPTOMETRIST

## 2024-12-12 PROCEDURE — 1159F MED LIST DOCD IN RCRD: CPT | Mod: CPTII,S$GLB,, | Performed by: OPTOMETRIST

## 2024-12-12 PROCEDURE — 3066F NEPHROPATHY DOC TX: CPT | Mod: CPTII,S$GLB,, | Performed by: OPTOMETRIST

## 2024-12-12 PROCEDURE — 92015 DETERMINE REFRACTIVE STATE: CPT | Mod: S$GLB,,, | Performed by: OPTOMETRIST

## 2024-12-12 PROCEDURE — 92014 COMPRE OPH EXAM EST PT 1/>: CPT | Mod: S$GLB,,, | Performed by: OPTOMETRIST

## 2024-12-12 PROCEDURE — 2023F DILAT RTA XM W/O RTNOPTHY: CPT | Mod: CPTII,S$GLB,, | Performed by: OPTOMETRIST

## 2024-12-12 PROCEDURE — 4010F ACE/ARB THERAPY RXD/TAKEN: CPT | Mod: CPTII,S$GLB,, | Performed by: OPTOMETRIST

## 2024-12-12 NOTE — PROGRESS NOTES
Subjective:       Patient ID: Nicholas Nugent is a 54 y.o. male      Chief Complaint   Patient presents with    Concerns About Ocular Health    Diabetic Eye Exam     History of Present Illness  Dls: 10/26/23 Dr. Delgado     55 y/o male presents today for diabetic eye exam.   Pt c/o blurry vision at distance and near ou. Pt wears otc readers     LBS ?     No tearing  + ou  itching  No burning  +  ou pain  No ha's  No floaters  No flashes    Eye meds  Otc gtts ou prn     Pohx:   S/p EXCISION OF PTERYGIUM    Fohx:   None    Hemoglobin A1C       Date                     Value               Ref Range             Status                08/09/2024               6.3 (H)             4.0 - 5.6 %           Final                 02/02/2024               6.3 (H)             4.0 - 5.6 %           Final                   09/15/2023               6.6 (H)             4.0 - 5.6 %           Final                    Assessment/Plan:     1. Type 2 diabetes mellitus without complication, without long-term current use of insulin (Primary)  No diabetic retinopathy. Discussed with pt the effects of diabetes on vision, importance of good blood sugar control, compliance with meds, and follow up care with PCP. Return in 1 year for dilated eye exam, sooner PRN.      2. Type 2 diabetes mellitus with diabetic nephropathy, without long-term current use of insulin  - Ambulatory referral/consult to Optometry    3. Refractive error  Educated patient on refractive error and discussed lens options. Dispensed updated spectacle Rx. Educated about adaptation period to new specs.    Eyeglass Final Rx       Eyeglass Final Rx         Sphere Cylinder Axis Add    Right [place] +0.50 010 +1.75    Left +0.50 +1.25 120 +1.75      Expiration Date: 12/12/2025                      Follow up in about 1 year (around 12/12/2025) for Diabetic Eye Exam.

## 2024-12-15 DIAGNOSIS — E11.21 TYPE 2 DIABETES MELLITUS WITH DIABETIC NEPHROPATHY, WITHOUT LONG-TERM CURRENT USE OF INSULIN: Chronic | ICD-10-CM

## 2024-12-15 NOTE — TELEPHONE ENCOUNTER
Care Due:                  Date            Visit Type   Department     Provider  --------------------------------------------------------------------------------                                [facility]                              PRIMARY      MEDICINE/  Last Visit: 08-      CARE ([facility])   INTERNAL MED   Gurwinder Olmedo                              [facility]                              PRIMARY      MEDICINE/  Next Visit: 02-      CARE ([facility])   INTERNAL MED   Gurwinder Olmedo                                                            Last  Test          Frequency    Reason                     Performed    Due Date  --------------------------------------------------------------------------------    HBA1C.......  6 months...  JARDIANCE, semaglutide...  08- 02-    Vitamin D...  12 months..  ergocalciferol...........  02- 01-    Health [facility] Embedded Care Due Messages. Reference number: [id number].   12/15/2024 8:36:19 AM CST

## 2024-12-16 RX ORDER — SEMAGLUTIDE 1.34 MG/ML
1 INJECTION, SOLUTION SUBCUTANEOUS
Qty: 9 ML | Refills: 0 | Status: SHIPPED | OUTPATIENT
Start: 2024-12-16

## 2024-12-16 NOTE — TELEPHONE ENCOUNTER
Refill Decision Note   Nicholas Nugent  is requesting a refill authorization.  Brief Assessment and Rationale for Refill:  Approve     Medication Therapy Plan: FLOS      Comments:     Note composed:11:20 AM 12/16/2024

## 2025-02-07 ENCOUNTER — LAB VISIT (OUTPATIENT)
Dept: LAB | Facility: HOSPITAL | Age: 55
End: 2025-02-07
Attending: FAMILY MEDICINE
Payer: COMMERCIAL

## 2025-02-07 DIAGNOSIS — N18.31 CHRONIC KIDNEY DISEASE, STAGE 3A: Chronic | ICD-10-CM

## 2025-02-07 DIAGNOSIS — I10 BENIGN HYPERTENSION: Chronic | ICD-10-CM

## 2025-02-07 DIAGNOSIS — E78.2 MIXED DYSLIPIDEMIA: Chronic | ICD-10-CM

## 2025-02-07 DIAGNOSIS — E11.21 TYPE 2 DIABETES MELLITUS WITH DIABETIC NEPHROPATHY, WITHOUT LONG-TERM CURRENT USE OF INSULIN: Chronic | ICD-10-CM

## 2025-02-07 DIAGNOSIS — D64.9 NORMOCYTIC ANEMIA: Chronic | ICD-10-CM

## 2025-02-07 LAB
ALBUMIN SERPL BCP-MCNC: 4.1 G/DL (ref 3.5–5.2)
ALBUMIN/CREAT UR: 194.8 UG/MG (ref 0–30)
ALP SERPL-CCNC: 70 U/L (ref 40–150)
ALT SERPL W/O P-5'-P-CCNC: 32 U/L (ref 10–44)
ANION GAP SERPL CALC-SCNC: 12 MMOL/L (ref 8–16)
AST SERPL-CCNC: 33 U/L (ref 10–40)
BASOPHILS # BLD AUTO: 0.03 K/UL (ref 0–0.2)
BASOPHILS NFR BLD: 0.3 % (ref 0–1.9)
BILIRUB SERPL-MCNC: 1.2 MG/DL (ref 0.1–1)
BUN SERPL-MCNC: 29 MG/DL (ref 6–20)
CALCIUM SERPL-MCNC: 10.2 MG/DL (ref 8.7–10.5)
CHLORIDE SERPL-SCNC: 107 MMOL/L (ref 95–110)
CHOLEST SERPL-MCNC: 93 MG/DL (ref 120–199)
CHOLEST/HDLC SERPL: 3 {RATIO} (ref 2–5)
CO2 SERPL-SCNC: 24 MMOL/L (ref 23–29)
CREAT SERPL-MCNC: 1.9 MG/DL (ref 0.5–1.4)
CREAT UR-MCNC: 77 MG/DL (ref 23–375)
DIFFERENTIAL METHOD BLD: ABNORMAL
EOSINOPHIL # BLD AUTO: 0.2 K/UL (ref 0–0.5)
EOSINOPHIL NFR BLD: 1.7 % (ref 0–8)
ERYTHROCYTE [DISTWIDTH] IN BLOOD BY AUTOMATED COUNT: 13.9 % (ref 11.5–14.5)
EST. GFR  (NO RACE VARIABLE): 41 ML/MIN/1.73 M^2
ESTIMATED AVG GLUCOSE: 137 MG/DL (ref 68–131)
FERRITIN SERPL-MCNC: 211 NG/ML (ref 20–300)
GLUCOSE SERPL-MCNC: 124 MG/DL (ref 70–110)
HBA1C MFR BLD: 6.4 % (ref 4–5.6)
HCT VFR BLD AUTO: 39.6 % (ref 40–54)
HDLC SERPL-MCNC: 31 MG/DL (ref 40–75)
HDLC SERPL: 33.3 % (ref 20–50)
HGB BLD-MCNC: 12.9 G/DL (ref 14–18)
IMM GRANULOCYTES # BLD AUTO: 0.02 K/UL (ref 0–0.04)
IMM GRANULOCYTES NFR BLD AUTO: 0.2 % (ref 0–0.5)
IRON SERPL-MCNC: 82 UG/DL (ref 45–160)
LDLC SERPL CALC-MCNC: 33 MG/DL (ref 63–159)
LYMPHOCYTES # BLD AUTO: 2.7 K/UL (ref 1–4.8)
LYMPHOCYTES NFR BLD: 30.8 % (ref 18–48)
MCH RBC QN AUTO: 28.8 PG (ref 27–31)
MCHC RBC AUTO-ENTMCNC: 32.6 G/DL (ref 32–36)
MCV RBC AUTO: 88 FL (ref 82–98)
MICROALBUMIN UR DL<=1MG/L-MCNC: 150 UG/ML
MONOCYTES # BLD AUTO: 0.7 K/UL (ref 0.3–1)
MONOCYTES NFR BLD: 7.5 % (ref 4–15)
NEUTROPHILS # BLD AUTO: 5.3 K/UL (ref 1.8–7.7)
NEUTROPHILS NFR BLD: 59.5 % (ref 38–73)
NONHDLC SERPL-MCNC: 62 MG/DL
NRBC BLD-RTO: 0 /100 WBC
PLATELET # BLD AUTO: 201 K/UL (ref 150–450)
PMV BLD AUTO: 11.1 FL (ref 9.2–12.9)
POTASSIUM SERPL-SCNC: 4 MMOL/L (ref 3.5–5.1)
PROT SERPL-MCNC: 8.2 G/DL (ref 6–8.4)
RBC # BLD AUTO: 4.48 M/UL (ref 4.6–6.2)
SATURATED IRON: 21 % (ref 20–50)
SODIUM SERPL-SCNC: 143 MMOL/L (ref 136–145)
TOTAL IRON BINDING CAPACITY: 383 UG/DL (ref 250–450)
TRANSFERRIN SERPL-MCNC: 259 MG/DL (ref 200–375)
TRIGL SERPL-MCNC: 145 MG/DL (ref 30–150)
WBC # BLD AUTO: 8.91 K/UL (ref 3.9–12.7)

## 2025-02-07 PROCEDURE — 83036 HEMOGLOBIN GLYCOSYLATED A1C: CPT | Performed by: FAMILY MEDICINE

## 2025-02-07 PROCEDURE — 82570 ASSAY OF URINE CREATININE: CPT | Performed by: FAMILY MEDICINE

## 2025-02-07 PROCEDURE — 80053 COMPREHEN METABOLIC PANEL: CPT | Performed by: FAMILY MEDICINE

## 2025-02-07 PROCEDURE — 82728 ASSAY OF FERRITIN: CPT | Performed by: FAMILY MEDICINE

## 2025-02-07 PROCEDURE — 84466 ASSAY OF TRANSFERRIN: CPT | Performed by: FAMILY MEDICINE

## 2025-02-07 PROCEDURE — 80061 LIPID PANEL: CPT | Performed by: FAMILY MEDICINE

## 2025-02-07 PROCEDURE — 85025 COMPLETE CBC W/AUTO DIFF WBC: CPT | Performed by: FAMILY MEDICINE

## 2025-02-07 PROCEDURE — 36415 COLL VENOUS BLD VENIPUNCTURE: CPT | Mod: PN | Performed by: FAMILY MEDICINE

## 2025-02-27 ENCOUNTER — OFFICE VISIT (OUTPATIENT)
Dept: FAMILY MEDICINE | Facility: CLINIC | Age: 55
End: 2025-02-27
Payer: COMMERCIAL

## 2025-02-27 ENCOUNTER — LAB VISIT (OUTPATIENT)
Dept: LAB | Facility: HOSPITAL | Age: 55
End: 2025-02-27
Attending: FAMILY MEDICINE
Payer: COMMERCIAL

## 2025-02-27 VITALS
DIASTOLIC BLOOD PRESSURE: 80 MMHG | BODY MASS INDEX: 30.24 KG/M2 | WEIGHT: 192.69 LBS | OXYGEN SATURATION: 98 % | SYSTOLIC BLOOD PRESSURE: 128 MMHG | RESPIRATION RATE: 18 BRPM | HEART RATE: 86 BPM | TEMPERATURE: 98 F | HEIGHT: 67 IN

## 2025-02-27 DIAGNOSIS — Z12.5 SCREENING FOR PROSTATE CANCER: ICD-10-CM

## 2025-02-27 DIAGNOSIS — D64.9 NORMOCYTIC ANEMIA: Chronic | ICD-10-CM

## 2025-02-27 DIAGNOSIS — I10 BENIGN HYPERTENSION: Chronic | ICD-10-CM

## 2025-02-27 DIAGNOSIS — L03.113 CELLULITIS OF RIGHT UPPER EXTREMITY: ICD-10-CM

## 2025-02-27 DIAGNOSIS — N18.31 CHRONIC KIDNEY DISEASE, STAGE 3A: Chronic | ICD-10-CM

## 2025-02-27 DIAGNOSIS — E78.2 MIXED DYSLIPIDEMIA: Chronic | ICD-10-CM

## 2025-02-27 DIAGNOSIS — E11.21 TYPE 2 DIABETES MELLITUS WITH DIABETIC NEPHROPATHY, WITHOUT LONG-TERM CURRENT USE OF INSULIN: Primary | Chronic | ICD-10-CM

## 2025-02-27 DIAGNOSIS — E21.3 HYPERPARATHYROIDISM: Chronic | ICD-10-CM

## 2025-02-27 DIAGNOSIS — E11.21 TYPE 2 DIABETES MELLITUS WITH DIABETIC NEPHROPATHY, WITHOUT LONG-TERM CURRENT USE OF INSULIN: Chronic | ICD-10-CM

## 2025-02-27 DIAGNOSIS — Z28.21 VACCINATION DECLINED: ICD-10-CM

## 2025-02-27 LAB
25(OH)D3+25(OH)D2 SERPL-MCNC: 31 NG/ML (ref 30–96)
PTH-INTACT SERPL-MCNC: 68.7 PG/ML (ref 9–77)
URATE SERPL-MCNC: 7.2 MG/DL (ref 3.4–7)

## 2025-02-27 PROCEDURE — 3060F POS MICROALBUMINURIA REV: CPT | Mod: CPTII,S$GLB,, | Performed by: FAMILY MEDICINE

## 2025-02-27 PROCEDURE — 99999 PR PBB SHADOW E&M-EST. PATIENT-LVL V: CPT | Mod: PBBFAC,,, | Performed by: FAMILY MEDICINE

## 2025-02-27 PROCEDURE — 99214 OFFICE O/P EST MOD 30 MIN: CPT | Mod: S$GLB,,, | Performed by: FAMILY MEDICINE

## 2025-02-27 PROCEDURE — 82306 VITAMIN D 25 HYDROXY: CPT | Performed by: FAMILY MEDICINE

## 2025-02-27 PROCEDURE — 3079F DIAST BP 80-89 MM HG: CPT | Mod: CPTII,S$GLB,, | Performed by: FAMILY MEDICINE

## 2025-02-27 PROCEDURE — 1160F RVW MEDS BY RX/DR IN RCRD: CPT | Mod: CPTII,S$GLB,, | Performed by: FAMILY MEDICINE

## 2025-02-27 PROCEDURE — 84550 ASSAY OF BLOOD/URIC ACID: CPT | Performed by: FAMILY MEDICINE

## 2025-02-27 PROCEDURE — 3072F LOW RISK FOR RETINOPATHY: CPT | Mod: CPTII,S$GLB,, | Performed by: FAMILY MEDICINE

## 2025-02-27 PROCEDURE — 3008F BODY MASS INDEX DOCD: CPT | Mod: CPTII,S$GLB,, | Performed by: FAMILY MEDICINE

## 2025-02-27 PROCEDURE — 3044F HG A1C LEVEL LT 7.0%: CPT | Mod: CPTII,S$GLB,, | Performed by: FAMILY MEDICINE

## 2025-02-27 PROCEDURE — 4010F ACE/ARB THERAPY RXD/TAKEN: CPT | Mod: CPTII,S$GLB,, | Performed by: FAMILY MEDICINE

## 2025-02-27 PROCEDURE — 1159F MED LIST DOCD IN RCRD: CPT | Mod: CPTII,S$GLB,, | Performed by: FAMILY MEDICINE

## 2025-02-27 PROCEDURE — G2211 COMPLEX E/M VISIT ADD ON: HCPCS | Mod: S$GLB,,, | Performed by: FAMILY MEDICINE

## 2025-02-27 PROCEDURE — 3074F SYST BP LT 130 MM HG: CPT | Mod: CPTII,S$GLB,, | Performed by: FAMILY MEDICINE

## 2025-02-27 PROCEDURE — 83970 ASSAY OF PARATHORMONE: CPT | Performed by: FAMILY MEDICINE

## 2025-02-27 PROCEDURE — 3066F NEPHROPATHY DOC TX: CPT | Mod: CPTII,S$GLB,, | Performed by: FAMILY MEDICINE

## 2025-02-27 PROCEDURE — 36415 COLL VENOUS BLD VENIPUNCTURE: CPT | Mod: PN | Performed by: FAMILY MEDICINE

## 2025-02-27 RX ORDER — DOXYCYCLINE 100 MG/1
100 CAPSULE ORAL 2 TIMES DAILY
Qty: 14 CAPSULE | Refills: 0 | Status: SHIPPED | OUTPATIENT
Start: 2025-02-27 | End: 2025-03-06

## 2025-02-27 RX ORDER — SEMAGLUTIDE 2.68 MG/ML
2 INJECTION, SOLUTION SUBCUTANEOUS
Qty: 9 ML | Refills: 1 | Status: SHIPPED | OUTPATIENT
Start: 2025-02-27 | End: 2026-02-27

## 2025-03-03 ENCOUNTER — HOSPITAL ENCOUNTER (OUTPATIENT)
Dept: RADIOLOGY | Facility: HOSPITAL | Age: 55
Discharge: HOME | End: 2025-03-03
Attending: FAMILY MEDICINE
Payer: COMMERCIAL

## 2025-03-03 DIAGNOSIS — N18.31 CHRONIC KIDNEY DISEASE, STAGE 3A: Chronic | ICD-10-CM

## 2025-03-03 PROCEDURE — 76770 US EXAM ABDO BACK WALL COMP: CPT | Mod: TC

## 2025-03-03 PROCEDURE — 76770 US EXAM ABDO BACK WALL COMP: CPT | Mod: 26,,, | Performed by: RADIOLOGY

## 2025-03-07 ENCOUNTER — TELEPHONE (OUTPATIENT)
Dept: FAMILY MEDICINE | Facility: CLINIC | Age: 55
End: 2025-03-07
Payer: COMMERCIAL

## 2025-03-07 DIAGNOSIS — E21.3 HYPERPARATHYROIDISM: Chronic | ICD-10-CM

## 2025-03-07 NOTE — TELEPHONE ENCOUNTER
No care due was identified.  Health [facility] Embedded Care Due Messages. Reference number: [id number].   3/07/2025 3:18:28 PM CST

## 2025-03-07 NOTE — TELEPHONE ENCOUNTER
Informed pt that the provider has not went over lab results once he does we will contact them with the results.  ----- Message from Med Assistant Loya sent at 3/7/2025  1:55 PM CST -----  Name of Who is Calling: Esther wife of ILEANA SUTHERLAND [[id number]]  What is the request in detail: Pt wife is requesting a call back to discuss a MRI that is needed after his U/S that was done.  Can the clinic reply by [facility]: No  [facility]: [phone]

## 2025-03-09 RX ORDER — ERGOCALCIFEROL 1.25 MG/1
50000 CAPSULE ORAL
Qty: 3 CAPSULE | Refills: 1 | Status: SHIPPED | OUTPATIENT
Start: 2025-03-09

## 2025-03-16 NOTE — PROGRESS NOTES
"  Patient Name: Nicholas Nugent    : 1970  MRN: [id number]      Subjective:     Patient ID: Nicholas is a 55 y.o. male    Chief Complaint:  Follow-up    History of Present Illness    Nicholas presents today for follow up of diabetes, kidneys, and blood pressure.    He discontinued Jardiance one month ago. He takes Vitamin D monthly but is uncertain of exact dosage.    He drinks 4-5 bottles of water daily.    He developed an infected bump following a work-related injury two weeks ago. He self-treated with OTC antibiotics for 5-6 days with symptom improvement.      ROS:  General: -fever, -chills, -fatigue, -weight gain, -weight loss  Eyes: -vision changes, -redness, -discharge  ENT: -ear pain, -nasal congestion, -sore throat  Cardiovascular: -chest pain, -palpitations, -lower extremity edema  Respiratory: -cough, -shortness of breath  Gastrointestinal: -abdominal pain, -nausea, -vomiting, -diarrhea, -constipation, -blood in stool  Genitourinary: -dysuria, -hematuria, -frequency  Musculoskeletal: -joint pain, -muscle pain  Skin: -rash, -lesion  Neurological: -headache, -dizziness, -numbness, -tingling  Psychiatric: -anxiety, -depression, -sleep difficulty         Objective:   /80 (BP Location: Right arm)   Pulse 86   Temp 97.7 °F (36.5 °C) (Oral)   Resp 18   Ht 5' 7" (1.702 m)   Wt 87.4 kg (192 lb 10.9 oz)   SpO2 98%   BMI 30.18 kg/m²     Physical Exam  Vitals reviewed.   Constitutional:       General: He is not in acute distress.     Appearance: He is well-developed. He is obese. He is not ill-appearing or diaphoretic.   HENT:      Head: Normocephalic.      Right Ear: External ear normal.      Left Ear: External ear normal.      Nose: Nose normal.      Mouth/Throat:      Pharynx: No oropharyngeal exudate.   Eyes:      Extraocular Movements: Extraocular movements intact.      Pupils: Pupils are equal, round, and reactive to light.   Neck:      Trachea: No tracheal deviation.   Cardiovascular:      Rate and " Rhythm: Normal rate and regular rhythm.      Heart sounds: Normal heart sounds.   Pulmonary:      Effort: Pulmonary effort is normal.      Breath sounds: Normal breath sounds. No wheezing or rales.   Abdominal:      General: Bowel sounds are normal.      Palpations: Abdomen is soft. Abdomen is not rigid. There is no mass.      Tenderness: There is no abdominal tenderness. There is no guarding or rebound.   Musculoskeletal:      Cervical back: Normal range of motion and neck supple.   Lymphadenopathy:      Cervical: No cervical adenopathy.   Neurological:      Mental Status: He is alert and oriented to person, place, and time.      Gait: Gait normal.   Psychiatric:         Mood and Affect: Mood normal.         Behavior: Behavior normal.        Lab Visit on 02/27/2025   Component Date Value Ref Range Status    PTH, Intact 02/27/2025 68.7  9.0 - 77.0 pg/mL Final    Uric Acid 02/27/2025 7.2 (H)  3.4 - 7.0 mg/dL Final    Vit D, 25-Hydroxy 02/27/2025 31  30 - 96 ng/mL Final    Comment: Vitamin D deficiency.........<10 ng/mL                              Vitamin D insufficiency......10-29 ng/mL       Vitamin D sufficiency........> or equal to 30 ng/mL  Vitamin D toxicity............>100 ng/mL     Lab Visit on 02/07/2025   Component Date Value Ref Range Status    WBC 02/07/2025 8.91  3.90 - 12.70 K/uL Final    RBC 02/07/2025 4.48 (L)  4.60 - 6.20 M/uL Final    Hemoglobin 02/07/2025 12.9 (L)  14.0 - 18.0 g/dL Final    Hematocrit 02/07/2025 39.6 (L)  40.0 - 54.0 % Final    MCV 02/07/2025 88  82 - 98 fL Final    MCH 02/07/2025 28.8  27.0 - 31.0 pg Final    MCHC 02/07/2025 32.6  32.0 - 36.0 g/dL Final    RDW 02/07/2025 13.9  11.5 - 14.5 % Final    Platelets 02/07/2025 201  150 - 450 K/uL Final    MPV 02/07/2025 11.1  9.2 - 12.9 fL Final    Immature Granulocytes 02/07/2025 0.2  0.0 - 0.5 % Final    Gran # (ANC) 02/07/2025 5.3  1.8 - 7.7 K/uL Final    Immature Grans (Abs) 02/07/2025 0.02  0.00 - 0.04 K/uL Final    Comment: Mild  elevation in immature granulocytes is non specific and   can be seen in a variety of conditions including stress response,   acute inflammation, trauma and pregnancy. Correlation with other   laboratory and clinical findings is essential.      Lymph # 02/07/2025 2.7  1.0 - 4.8 K/uL Final    Mono # 02/07/2025 0.7  0.3 - 1.0 K/uL Final    Eos # 02/07/2025 0.2  0.0 - 0.5 K/uL Final    Baso # 02/07/2025 0.03  0.00 - 0.20 K/uL Final    nRBC 02/07/2025 0  0 /100 WBC Final    Gran % 02/07/2025 59.5  38.0 - 73.0 % Final    Lymph % 02/07/2025 30.8  18.0 - 48.0 % Final    Mono % 02/07/2025 7.5  4.0 - 15.0 % Final    Eosinophil % 02/07/2025 1.7  0.0 - 8.0 % Final    Basophil % 02/07/2025 0.3  0.0 - 1.9 % Final    Differential Method 02/07/2025 Automated   Final    Iron 02/07/2025 82  45 - 160 ug/dL Final    Transferrin 02/07/2025 259  200 - 375 mg/dL Final    TIBC 02/07/2025 383  250 - 450 ug/dL Final    Saturated Iron 02/07/2025 21  20 - 50 % Final    Ferritin 02/07/2025 211  20.0 - 300.0 ng/mL Final    Sodium 02/07/2025 143  136 - 145 mmol/L Final    Potassium 02/07/2025 4.0  3.5 - 5.1 mmol/L Final    Chloride 02/07/2025 107  95 - 110 mmol/L Final    CO2 02/07/2025 24  23 - 29 mmol/L Final    Glucose 02/07/2025 124 (H)  70 - 110 mg/dL Final    BUN 02/07/2025 29 (H)  6 - 20 mg/dL Final    Creatinine 02/07/2025 1.9 (H)  0.5 - 1.4 mg/dL Final    Calcium 02/07/2025 10.2  8.7 - 10.5 mg/dL Final    Total Protein 02/07/2025 8.2  6.0 - 8.4 g/dL Final    Albumin 02/07/2025 4.1  3.5 - 5.2 g/dL Final    Total Bilirubin 02/07/2025 1.2 (H)  0.1 - 1.0 mg/dL Final    Comment: For infants and newborns, interpretation of results should be based  on gestational age, weight and in agreement with clinical  observations.    Premature Infant recommended reference ranges:  Up to 24 hours.............<8.0 mg/dL  Up to 48 hours............<12.0 mg/dL  3-5 days..................<15.0 mg/dL  6-29 days.................<15.0 mg/dL      Alkaline  Phosphatase 02/07/2025 70  40 - 150 U/L Final    AST 02/07/2025 33  10 - 40 U/L Final    ALT 02/07/2025 32  10 - 44 U/L Final    eGFR 02/07/2025 41 (A)  >60 mL/min/1.73 m^2 Final    Anion Gap 02/07/2025 12  8 - 16 mmol/L Final    Cholesterol 02/07/2025 93 (L)  120 - 199 mg/dL Final    Comment: The National Cholesterol Education Program (NCEP) has set the  following guidelines (reference ranges) for Cholesterol:  Optimal.....................<200 mg/dL  Borderline High.............200-239 mg/dL  High........................> or = 240 mg/dL      Triglycerides 02/07/2025 145  30 - 150 mg/dL Final    Comment: The National Cholesterol Education Program (NCEP) has set the  following guidelines (reference values) for triglycerides:  Normal......................<150 mg/dL  Borderline High.............150-199 mg/dL  High........................200-499 mg/dL      HDL 02/07/2025 31 (L)  40 - 75 mg/dL Final    Comment: The National Cholesterol Education Program (NCEP) has set the  following guidelines (reference values) for HDL Cholesterol:  Low...............<40 mg/dL  Optimal...........>60 mg/dL      LDL Cholesterol 02/07/2025 33.0 (L)  63.0 - 159.0 mg/dL Final    Comment: The National Cholesterol Education Program (NCEP) has set the  following guidelines (reference values) for LDL Cholesterol:  Optimal.......................<130 mg/dL  Borderline High...............130-159 mg/dL  High..........................160-189 mg/dL  Very High.....................>190 mg/dL      HDL/Cholesterol Ratio 02/07/2025 33.3  20.0 - 50.0 % Final    Total Cholesterol/HDL Ratio 02/07/2025 3.0  2.0 - 5.0 Final    Non-HDL Cholesterol 02/07/2025 62  mg/dL Final    Comment: Risk category and Non-HDL cholesterol goals:  Coronary heart disease (CHD)or equivalent (10-year risk of CHD >20%):  Non-HDL cholesterol goal     <130 mg/dL  Two or more CHD risk factors and 10-year risk of CHD <= 20%:  Non-HDL cholesterol goal     <160 mg/dL  0 to 1 CHD risk  factor:  Non-HDL cholesterol goal     <190 mg/dL      Hemoglobin A1C 02/07/2025 6.4 (H)  4.0 - 5.6 % Final    Comment: ADA Screening Guidelines:  5.7-6.4%  Consistent with prediabetes  >or=6.5%  Consistent with diabetes    High levels of fetal hemoglobin interfere with the HbA1C  assay. Heterozygous hemoglobin variants (HbS, HgC, etc)do  not significantly interfere with this assay.   However, presence of multiple variants may affect accuracy.      Estimated Avg Glucose 02/07/2025 137 (H)  68 - 131 mg/dL Final    Microalbumin, Urine 02/07/2025 150.0  ug/mL Final    Creatinine, Urine 02/07/2025 77.0  23.0 - 375.0 mg/dL Final    Microalb/Creat Ratio 02/07/2025 194.8 (H)  0.0 - 30.0 ug/mg Final         Assessment        ICD-10-CM ICD-9-CM   1. Type 2 diabetes mellitus with diabetic nephropathy, without long-term current use of insulin  E11.21 250.40     583.81   2. Chronic kidney disease, stage 3a  N18.31 585.3   3. Mixed dyslipidemia  E78.2 272.2   4. Benign hypertension  I10 401.1   5. Normocytic anemia  D64.9 285.9   6. Hyperparathyroidism  E21.3 252.00   7. Cellulitis of right upper extremity  L03.113 682.3   8. Screening for prostate cancer  Z12.5 V76.44   9. Vaccination declined  Z28.21 V64.06         Plan:   Assessment & Plan      1. Type 2 diabetes mellitus with diabetic nephropathy, without long-term current use of insulin  Overview:  Lab Results   Component Value Date    HGBA1C 6.4 (H) 02/07/2025    HGBA1C 6.3 (H) 08/09/2024    HGBA1C 6.7 (H) 07/01/2024     Lab Results   Component Value Date    LDLCALC 33.0 (L) 02/07/2025     Lab Results   Component Value Date    MICALBCREAT 194.8 (H) 02/07/2025    MICALBCREAT 131.8 (H) 08/09/2024      Eye Exam:  12-  Foot exam:  08-    Orders:  -     semaglutide (OZEMPIC) 2 mg/dose (8 mg/3 mL) PnIj; Inject 2 mg into the skin every 7 days.  Dispense: 9 mL; Refill: 1  -     Uric Acid; Future; Expected date: 02/27/2025  -     CBC auto differential; Future; Expected  date: 08/27/2025  -     Comprehensive metabolic panel; Future; Expected date: 08/27/2025  -     Hemoglobin A1c; Future; Expected date: 08/27/2025  -     Lipid panel; Future; Expected date: 08/27/2025  Will increase Ozempic to 2 milligrams as patient discontinue Jardiance.    Recheck labs in the next 5 to 6 months.    2. Chronic kidney disease, stage 3a  Overview:  Lab Results   Component Value Date    EGFRNORACEVR 41 (A) 02/07/2025    EGFRNORACEVR 45 (L) 11/07/2024    EGFRNORACEVR 41 (A) 08/09/2024      Lab Results   Component Value Date    CREATININE 1.9 (H) 02/07/2025    CREATININE 1.75 (H) 11/07/2024    CREATININE 1.9 (H) 08/09/2024     Orders:  -     Ambulatory referral/consult to Nephrology; Future; Expected date: 03/06/2025  -     US Retroperitoneal Complete; Future; Expected date: 02/27/2025  -     PTH, intact; Future; Expected date: 02/27/2025  -     Uric Acid; Future; Expected date: 02/27/2025  -     Vitamin D; Future; Expected date: 02/27/2025  -     Comprehensive metabolic panel; Future; Expected date: 08/27/2025  Discussed importance of good hydration.    Patient considering switching to an [facility] nephrologist.    Referral placed.      3. Mixed dyslipidemia  Overview:  Lab Results   Component Value Date    CHOL 93 (L) 02/07/2025    CHOL 101 (L) 08/09/2024    CHOL 107 (L) 02/02/2024     Lab Results   Component Value Date    HDL 31 (L) 02/07/2025    HDL 26 (L) 08/09/2024    HDL 23 (L) 02/02/2024     Lab Results   Component Value Date    LDLCALC 33.0 (L) 02/07/2025    LDLCALC 21.2 (L) 08/09/2024    LDLCALC 29.8 (L) 02/02/2024     Lab Results   Component Value Date    TRIG 145 02/07/2025    TRIG 269 (H) 08/09/2024    TRIG 271 (H) 02/02/2024     Lab Results   Component Value Date    CHOLHDL 33.3 02/07/2025    CHOLHDL 25.7 08/09/2024    CHOLHDL 21.5 02/02/2024        The ASCVD Risk score (Irma PENA, et al., 2019) failed to calculate for the following reasons:    The valid total cholesterol range is 130 to 320  "mg/dL      Orders:  -     Uric Acid; Future; Expected date: 02/27/2025  -     Comprehensive metabolic panel; Future; Expected date: 08/27/2025  -     Lipid panel; Future; Expected date: 08/27/2025  Continue atorvastatin 80 milligrams daily.      4. Benign hypertension  -     Uric Acid; Future; Expected date: 02/27/2025  -     Comprehensive metabolic panel; Future; Expected date: 08/27/2025  Blood pressure control.    Continue current regimen.      5. Normocytic anemia  Overview:  Lab Results   Component Value Date    HGB 12.9 (L) 02/07/2025    HGB 12.6 (L) 08/09/2024    HGB 13.2 (L) 02/02/2024      Lab Results   Component Value Date    HCT 39.6 (L) 02/07/2025    HCT 39.1 (L) 08/09/2024    HCT 39.6 (L) 02/02/2024     Lab Results   Component Value Date    MCV 88 02/07/2025    MCV 88 08/09/2024    MCV 86 02/02/2024    No results found for: "RETIC"    Lab Results   Component Value Date    IRON 82 02/07/2025    IRON 48 02/02/2024    IRON 84 09/19/2023      Lab Results   Component Value Date    FESATURATED 21 02/07/2025    FESATURATED 13 (L) 02/02/2024    FESATURATED 22 09/19/2023      Lab Results   Component Value Date    TIBC 383 02/07/2025    TIBC 361 02/02/2024    TIBC 379 09/19/2023      Lab Results   Component Value Date    FERRITIN 211 02/07/2025    FERRITIN 245 02/02/2024    FERRITIN 260 09/19/2023     Lab Results   Component Value Date    TRANSFERRIN 259 02/07/2025    TRANSFERRIN 244 02/02/2024    TRANSFERRIN 256 09/19/2023    No results found for: "SEDRATE" No results found for: "CRP"     Orders:  -     CBC auto differential; Future; Expected date: 08/27/2025  Anemia stable.    Continue monitoring.      6. Hyperparathyroidism  Overview:  Lab Results   Component Value Date    .0 (H) 02/02/2024    PTH 93.6 (H) 09/15/2023    PTH 73.3 06/13/2022      Lab Results   Component Value Date    DPHDTNKJ48ZC 32 02/02/2024    UOEMGTUN29AR 36 09/15/2023    RVSFTXMS89JC 45 06/13/2022       Orders:  -     PTH, intact; " Future; Expected date: 02/27/2025  -     Uric Acid; Future; Expected date: 02/27/2025  -     Vitamin D; Future; Expected date: 02/27/2025  Recheck PTH and vitamin-D.      7. Cellulitis of right upper extremity  -     doxycycline (VIBRAMYCIN) 100 MG Cap; Take 1 capsule (100 mg total) by mouth 2 (two) times daily. for 7 days  Dispense: 14 capsule; Refill: 0  Course of doxycycline prescribed.      8. Screening for prostate cancer  -     PSA, Screening; Future; Expected date: 08/27/2025  Risks and benefits of prostate cancer screening reviewed, and will proceed with screening    9. Vaccination declined             -Gurwinder Olmedo Jr., MD, AAHIVS      This note was generated with the assistance of ambient listening technology. Verbal consent was obtained by the patient and accompanying visitor(s) for the recording of patient appointment to facilitate this note. I attest to having reviewed and edited the generated note for accuracy, though some syntax or spelling errors may persist. Please contact the author of this note for any clarification.      There are no Patient Instructions on file for this visit.      Follow up in about 6 months (around 8/27/2025) for Diabetes, Chronic Kidney Disease, Hypertension, Lipids (fasting labs a week prior).   Future Appointments   Date Time Provider Department Center   8/20/2025  8:00 AM LAB, [facility]   8/27/2025  9:00 AM Gurwinder Olmedo Jr., MD [facility]

## 2025-03-20 ENCOUNTER — RESULTS FOLLOW-UP (OUTPATIENT)
Dept: FAMILY MEDICINE | Facility: CLINIC | Age: 55
End: 2025-03-20
Payer: COMMERCIAL

## 2025-03-20 DIAGNOSIS — N28.89 OTHER SPECIFIED DISORDERS OF KIDNEY AND URETER: Primary | ICD-10-CM

## 2025-03-20 DIAGNOSIS — N28.89 KIDNEY MASS: ICD-10-CM

## 2025-03-20 PROCEDURE — 99999 PR PBB SHADOW E&M-EST. PATIENT-LVL I: CPT | Mod: PBBFAC,,, | Performed by: FAMILY MEDICINE

## 2025-03-21 ENCOUNTER — TELEPHONE (OUTPATIENT)
Dept: FAMILY MEDICINE | Facility: CLINIC | Age: 55
End: 2025-03-21
Payer: COMMERCIAL

## 2025-03-21 NOTE — TELEPHONE ENCOUNTER
[facility]  ----- Message from Gurwinder Olmedo MD sent at 3/20/2025  8:52 PM CDT -----  Please let patient know that his ultrasound showed a mass in the left kidney that needs further imaging. I placed a referral to get a MRI scan and he should also follow up with a urologist. I placed   an order for this but he should have the MRI  scan done first.   Please schedule MRI  ----- Message -----  From: Interface, Rad Results In  Sent: 3/3/2025   9:09 AM CDT  To: Gurwinder Olmedo Jr., MD

## 2025-05-09 ENCOUNTER — PATIENT OUTREACH (OUTPATIENT)
Dept: ADMINISTRATIVE | Facility: HOSPITAL | Age: 55
End: 2025-05-09
Payer: COMMERCIAL

## 2025-05-11 ENCOUNTER — HOSPITAL ENCOUNTER (OUTPATIENT)
Dept: RADIOLOGY | Facility: HOSPITAL | Age: 55
Discharge: HOME | End: 2025-05-11
Attending: FAMILY MEDICINE
Payer: COMMERCIAL

## 2025-05-11 DIAGNOSIS — N28.89 OTHER SPECIFIED DISORDERS OF KIDNEY AND URETER: ICD-10-CM

## 2025-05-11 DIAGNOSIS — N28.89 KIDNEY MASS: ICD-10-CM

## 2025-05-11 PROCEDURE — 74181 MRI ABDOMEN W/O CONTRAST: CPT | Mod: TC

## 2025-05-11 PROCEDURE — 72195 MRI PELVIS W/O DYE: CPT | Mod: 26,,, | Performed by: RADIOLOGY

## 2025-05-11 PROCEDURE — 74181 MRI ABDOMEN W/O CONTRAST: CPT | Mod: 26,,, | Performed by: RADIOLOGY

## 2025-05-18 ENCOUNTER — RESULTS FOLLOW-UP (OUTPATIENT)
Dept: FAMILY MEDICINE | Facility: CLINIC | Age: 55
End: 2025-05-18
Payer: COMMERCIAL

## 2025-05-19 ENCOUNTER — TELEPHONE (OUTPATIENT)
Dept: FAMILY MEDICINE | Facility: CLINIC | Age: 55
End: 2025-05-19
Payer: COMMERCIAL

## 2025-05-19 NOTE — TELEPHONE ENCOUNTER
Appointment scheduled letter sent out  ----- Message from Gurwinder Olmedo MD sent at 5/18/2025  9:17 PM CDT -----  Please schedule patient for virtual visit with me to discuss MRI results of kidneys  ----- Message -----  From: Interface, Rad Results In  Sent: 5/12/2025   9:33 AM CDT  To: Gurwinder Olmedo Jr., MD

## 2025-05-23 ENCOUNTER — OFFICE VISIT (OUTPATIENT)
Dept: FAMILY MEDICINE | Facility: CLINIC | Age: 55
End: 2025-05-23
Payer: COMMERCIAL

## 2025-05-23 DIAGNOSIS — N28.1 COMPLEX RENAL CYST: Primary | Chronic | ICD-10-CM

## 2025-05-23 DIAGNOSIS — Z87.442 HISTORY OF URIC ACID STAGHORN CALCULUS: ICD-10-CM

## 2025-05-23 DIAGNOSIS — E78.2 MIXED DYSLIPIDEMIA: Chronic | ICD-10-CM

## 2025-05-23 PROCEDURE — 1160F RVW MEDS BY RX/DR IN RCRD: CPT | Mod: CPTII,95,, | Performed by: FAMILY MEDICINE

## 2025-05-23 PROCEDURE — 3072F LOW RISK FOR RETINOPATHY: CPT | Mod: CPTII,95,, | Performed by: FAMILY MEDICINE

## 2025-05-23 PROCEDURE — 3044F HG A1C LEVEL LT 7.0%: CPT | Mod: CPTII,95,, | Performed by: FAMILY MEDICINE

## 2025-05-23 PROCEDURE — 4010F ACE/ARB THERAPY RXD/TAKEN: CPT | Mod: CPTII,95,, | Performed by: FAMILY MEDICINE

## 2025-05-23 PROCEDURE — 3066F NEPHROPATHY DOC TX: CPT | Mod: CPTII,95,, | Performed by: FAMILY MEDICINE

## 2025-05-23 PROCEDURE — 98006 SYNCH AUDIO-VIDEO EST MOD 30: CPT | Mod: 95,,, | Performed by: FAMILY MEDICINE

## 2025-05-23 PROCEDURE — 1159F MED LIST DOCD IN RCRD: CPT | Mod: CPTII,95,, | Performed by: FAMILY MEDICINE

## 2025-05-23 PROCEDURE — 3060F POS MICROALBUMINURIA REV: CPT | Mod: CPTII,95,, | Performed by: FAMILY MEDICINE

## 2025-05-23 NOTE — PROGRESS NOTES
The patient location is: [place], [place]  The chief complaint leading to consultation is: Abnormal MRI    Visit type: audiovisual    Face to Face time with patient: 16 minutes  20 minutes of total time spent on the encounter, which includes face to face time and non-face to face time preparing to see the patient (eg, review of tests), Obtaining and/or reviewing separately obtained history, Documenting clinical information in the electronic or other health record, Independently interpreting results (not separately reported) and communicating results to the patient/family/caregiver, or Care coordination (not separately reported).         Each patient to whom he or she provides medical services by telemedicine is:  (1) informed of the relationship between the physician and patient and the respective role of any other health care provider with respect to management of the patient; and (2) notified that he or she may decline to receive medical services by telemedicine and may withdraw from such care at any time.    Notes:       Patient Name: Nicholas Nugent    : 1970  MRN: [id number]      Subjective:     Patient ID: Nicholas is a 55 y.o. male    Chief Complaint:  No chief complaint on file.    History of Present Illness    Nicholas presents today for follow up of MRI results.    He has a history of kidney stones with surgical intervention in .     Recent MRI (2025) showed left kidney hematoma and a complex cyst.    His atorvastatin dose was reduced from 80 to 40 mg due to body pain, by another provider. He was started on Kerendia for kidney protection, by nephrology. He discontinued fish oil due to perceived lack of efficacy.    He also states his nephrologist wants him to see urology secondary to history of staghorn kidney stone.    ROS:  General: -fever, -chills, -fatigue, -weight gain, -weight loss  Eyes: -vision changes, -redness, -discharge  ENT: -ear pain, -nasal congestion, -sore throat  Cardiovascular:  -chest pain, -palpitations, -lower extremity edema  Respiratory: -cough, -shortness of breath  Gastrointestinal: -abdominal pain, -nausea, -vomiting, -diarrhea, -constipation, -blood in stool  Genitourinary: -dysuria, -hematuria, -frequency  Musculoskeletal: -joint pain, -muscle pain, +bone pain, +body aches  Skin: -rash, -lesion  Neurological: -headache, -dizziness, -numbness, -tingling  Psychiatric: -anxiety, -depression, -sleep difficulty           Objective:   There were no vitals taken for this visit.    Physical Exam  Pulmonary:      Effort: Pulmonary effort is normal.   Neurological:      Mental Status: He is alert.   Psychiatric:         Behavior: Behavior normal.        Narrative & Impression  EXAMINATION:  MRI ABDOMEN PELVIS WITHOUT CONTRAST (XPD)     CLINICAL HISTORY:  Renal mass, follow up (Ped 0-18y);  Other specified disorders of kidney and ureter     TECHNIQUE:  Multisequence, multiplanar MRI of the abdomen and pelvis performed without intravenous gadolinium.     Limitations: Lack of contrast limits ability to detect solid masses.     COMPARISON:  Renal ultrasound 03/03/2025     FINDINGS:  Liver: Homogeneous background signal.  No focal lesions.     Biliary: Gallbladder is unremarkable.  No intrahepatic or extrahepatic biliary dilatation.     Pancreas: Unremarkable.  No pancreatic ductal dilatation.     Spleen: Normal size.  No focal lesions.     Adrenal glands: Left adrenal gland is normal.  There is a 2 cm lesion involving the right adrenal gland which is T1 and T2 hyperintense and suppresses with fat saturation compatible with a myelolipoma.     Kidneys: There is left renal cortical scarring and there are multiple simple and complex/proteinaceous or hemorrhagic cysts.  On the right side there is a tiny 3 mm hyperdense cyst in the lower pole.  The remainder of the cysts appear simple.  On the left side there is a remote small subcapsular hematoma measuring approximately 2.2 cm in transverse  diameter.  There is a complex lesion that demonstrates layering hyperintense T1 signal and eccentric low T2 signal and septations at the level of the interpolar aspect of the left kidney measuring approximately 2.5 by 2.1 cm.     Miscellaneous: The visualized pelvic viscera and bones are unremarkable on noncontrast imaging.     Impression:     1. Postinflammatory changes of the left kidney with a remote subcapsular hematoma and a complex cystic lesion of the midpole.  Recommend surveillance with repeat study in 6 months.  2. Otherwise mostly simple bilateral renal cysts.  3. Right adrenal myelolipoma        Electronically signed by:Paul Santiago Jr  Date:                                            05/12/2025  Time:                                           09:31    Assessment        ICD-10-CM ICD-9-CM   1. Complex renal cyst  N28.1 753.10   2. Mixed dyslipidemia  E78.2 272.2   3. History of uric acid staghorn calculus  Z87.442 V13.01         Plan:   Assessment & Plan           1. Complex renal cyst  Overview:  05-  MRI Abdomen/Pelvis  1. Postinflammatory changes of the left kidney with a remote subcapsular hematoma and a complex cystic lesion of the midpole.  Recommend surveillance with repeat study in 6 months.  2. Otherwise mostly simple bilateral renal cysts.  3. Right adrenal myelolipoma    Orders:  -     Ambulatory referral/consult to Urology; Future; Expected date: 05/30/2025  -     MRI Abdomen Pelvis Without Contrast (XPD); Future; Expected date: 11/11/2025  Patient has seen Nephrology.  Order placed to repeat MRI in six months as per radiologist recommendations.      2. Mixed dyslipidemia  Overview:  Lab Results   Component Value Date    CHOL 93 (L) 02/07/2025    CHOL 101 (L) 08/09/2024    CHOL 107 (L) 02/02/2024     Lab Results   Component Value Date    HDL 31 (L) 02/07/2025    HDL 26 (L) 08/09/2024    HDL 23 (L) 02/02/2024     Lab Results   Component Value Date    LDLCALC 33.0 (L) 02/07/2025     LDLCALC 21.2 (L) 08/09/2024    LDLCALC 29.8 (L) 02/02/2024     Lab Results   Component Value Date    TRIG 145 02/07/2025    TRIG 269 (H) 08/09/2024    TRIG 271 (H) 02/02/2024     Lab Results   Component Value Date    CHOLHDL 33.3 02/07/2025    CHOLHDL 25.7 08/09/2024    CHOLHDL 21.5 02/02/2024        The ASCVD Risk score (Irma DK, et al., 2019) failed to calculate for the following reasons:    The valid total cholesterol range is 130 to 320 mg/dL      Orders:  -     atorvastatin (LIPITOR) 80 MG tablet; Take 0.5 tablets (40 mg total) by mouth once daily.  Noted decreased atorvastatin dose.  Patient has schedule labs to repeat lipids in August.    3. History of uric acid staghorn calculus  Overview:  - h/o staghorn calculus, s/p lithotomy at [facility] in 2015.    Orders:  -     Ambulatory referral/consult to Urology; Future; Expected date: 05/30/2025  Patient states that Nephrology wanted patient to follow up with Urology secondary to staghorn calculus history.    Referral placed to Urology.           -Gurwinder Olmedo Jr., MD, AAHIVS      This note was generated with the assistance of ambient listening technology. Verbal consent was obtained by the patient and accompanying visitor(s) for the recording of patient appointment to facilitate this note. I attest to having reviewed and edited the generated note for accuracy, though some syntax or spelling errors may persist. Please contact the author of this note for any clarification.      There are no Patient Instructions on file for this visit.      No follow-ups on file.   Future Appointments   Date Time Provider Department Center   8/20/2025  8:00 AM LAB, [facility]RAW STATION [facility] [place]   8/27/2025  9:00 AM Gurwinder Olmedo Jr., MD [facility]

## 2025-05-25 PROBLEM — Z87.442 HISTORY OF URIC ACID STAGHORN CALCULUS: Status: ACTIVE | Noted: 2025-05-25

## 2025-05-25 PROBLEM — N28.1 COMPLEX RENAL CYST: Chronic | Status: ACTIVE | Noted: 2025-05-25

## 2025-05-25 RX ORDER — ATORVASTATIN CALCIUM 80 MG/1
40 TABLET, FILM COATED ORAL DAILY
Start: 2025-05-25

## 2025-07-08 NOTE — PROGRESS NOTES
Subjective:       Nicholas Nugent is a 55 y.o. male who is a new patient who was referred by Dr. Gurwinder Olmedo Jr.  for evaluation of abnormal imaging. Declines  services.     Referred for abnormal imaging. Recent MRI abd w/o contrast (5/25) showed complex renal cysts with indeterminate left renal cyst.  Evaluation limited by lack of contrast. +CKD, unable to give contrast. Recommended for repeat imaging in 6 months.    He has h/o nephrolithiasis. Prior L staghorn calculus s/p PCNL in 2018 (Dr King).     He has also seen Dr Labadie for ED - was using [facility]. Previously on TRT.     Cr 1.67 (5/25)    RONNY 3/25 - 3cm LUP mass, b/l renal cysts. Possible LUP stone.     MRI abd 5/25 - L renal cortical scarring, multiple simple and complex (proteinaceous/hemorrhagic) renal cysts. Small RLP hyperdense cyst, other simple cysts. L with remote subcapsular hematoma, 2.2cm.    The following portions of the patient's history were reviewed and updated as appropriate: allergies, current medications, past family history, past medical history, past social history, past surgical history and problem list.    Review of Systems  Review of systems completed. Pertinent positive and negatives listed in HPI.      Objective:    Vitals: There were no vitals taken for this visit.    Physical Exam   General: well developed, well nourished in no acute distress  Head: normocephalic, atraumatic  Neck: no obvious enlargement of thyroid  HEENT: EOMI, mucus membranes moist, sclera anicteric, no hearing impairment  Lungs: symmetric expansion, non-labored breathing  Neuro: alert and oriented x 3, no gross deficits  Psych: normal judgment and insight, normal mood/affect and non-anxious  Genitourinary:   deferred   SHARDA: deferred      Lab Review   Urine analysis today in clinic shows - negative     Lab Results   Component Value Date    WBC 8.91 02/07/2025    HGB 12.9 (L) 02/07/2025    HCT 39.6 (L) 02/07/2025    MCV 88 02/07/2025      02/07/2025     Lab Results   Component Value Date    CREATININE 1.9 (H) 02/07/2025    BUN 29 (H) 02/07/2025     Lab Results   Component Value Date    PSA 0.76 08/09/2024     Lab Results   Component Value Date    PSADIAG 0.35 10/15/2015       Imaging  RONNY, MRI reviewed  Reports and images were personally reviewed by me and discussed with the patient today         Assessment/Plan:      1. Complex renal cyst    - Concern for solid mass on recent RONNY. MRI w/o contrast with possible proteinaceous/hemorrhagic L renal cyst.  Discussed possibility of small renal mass/RCC.  MRI previously limited by lack of contrast 2/2 CKD.   - Will plan for repeat imaging in 6 months.  MRI w w/o contrast ordered. Check Cr prior.      2. Uric acid nephrolithiasis    - Stone not usually seen on MRI. Uric acid stones not visible on x-ray.    - Consider CT in future        Follow up in 6 mths with MRI

## 2025-07-15 ENCOUNTER — OFFICE VISIT (OUTPATIENT)
Dept: UROLOGY | Facility: CLINIC | Age: 55
End: 2025-07-15
Payer: COMMERCIAL

## 2025-07-15 DIAGNOSIS — N28.1 COMPLEX RENAL CYST: Primary | Chronic | ICD-10-CM

## 2025-07-15 DIAGNOSIS — N20.0 URIC ACID NEPHROLITHIASIS: ICD-10-CM

## 2025-07-15 PROCEDURE — 99999 PR PBB SHADOW E&M-EST. PATIENT-LVL III: CPT | Mod: PBBFAC,,, | Performed by: UROLOGY

## 2025-07-15 PROCEDURE — 4010F ACE/ARB THERAPY RXD/TAKEN: CPT | Mod: CPTII,S$GLB,, | Performed by: UROLOGY

## 2025-07-15 PROCEDURE — 1160F RVW MEDS BY RX/DR IN RCRD: CPT | Mod: CPTII,S$GLB,, | Performed by: UROLOGY

## 2025-07-15 PROCEDURE — 3066F NEPHROPATHY DOC TX: CPT | Mod: CPTII,S$GLB,, | Performed by: UROLOGY

## 2025-07-15 PROCEDURE — 1159F MED LIST DOCD IN RCRD: CPT | Mod: CPTII,S$GLB,, | Performed by: UROLOGY

## 2025-07-15 PROCEDURE — 99204 OFFICE O/P NEW MOD 45 MIN: CPT | Mod: S$GLB,,, | Performed by: UROLOGY

## 2025-07-15 PROCEDURE — 3044F HG A1C LEVEL LT 7.0%: CPT | Mod: CPTII,S$GLB,, | Performed by: UROLOGY

## 2025-07-15 PROCEDURE — 3060F POS MICROALBUMINURIA REV: CPT | Mod: CPTII,S$GLB,, | Performed by: UROLOGY

## 2025-07-15 PROCEDURE — 3072F LOW RISK FOR RETINOPATHY: CPT | Mod: CPTII,S$GLB,, | Performed by: UROLOGY

## 2025-07-18 ENCOUNTER — LAB VISIT (OUTPATIENT)
Dept: LAB | Facility: HOSPITAL | Age: 55
End: 2025-07-18
Attending: FAMILY MEDICINE
Payer: COMMERCIAL

## 2025-07-18 ENCOUNTER — OFFICE VISIT (OUTPATIENT)
Dept: FAMILY MEDICINE | Facility: CLINIC | Age: 55
End: 2025-07-18
Payer: COMMERCIAL

## 2025-07-18 VITALS
HEART RATE: 77 BPM | OXYGEN SATURATION: 98 % | DIASTOLIC BLOOD PRESSURE: 84 MMHG | RESPIRATION RATE: 18 BRPM | SYSTOLIC BLOOD PRESSURE: 116 MMHG | BODY MASS INDEX: 29.45 KG/M2 | WEIGHT: 188.06 LBS

## 2025-07-18 DIAGNOSIS — D64.9 NORMOCYTIC ANEMIA: ICD-10-CM

## 2025-07-18 DIAGNOSIS — I10 BENIGN HYPERTENSION: Chronic | ICD-10-CM

## 2025-07-18 DIAGNOSIS — N18.31 CHRONIC KIDNEY DISEASE, STAGE 3A: Chronic | ICD-10-CM

## 2025-07-18 DIAGNOSIS — E78.2 MIXED DYSLIPIDEMIA: Chronic | ICD-10-CM

## 2025-07-18 DIAGNOSIS — E11.21 TYPE 2 DIABETES MELLITUS WITH DIABETIC NEPHROPATHY, WITHOUT LONG-TERM CURRENT USE OF INSULIN: ICD-10-CM

## 2025-07-18 DIAGNOSIS — E11.21 TYPE 2 DIABETES MELLITUS WITH DIABETIC NEPHROPATHY, WITHOUT LONG-TERM CURRENT USE OF INSULIN: Primary | Chronic | ICD-10-CM

## 2025-07-18 LAB
ABSOLUTE EOSINOPHIL (OHS): 0.18 K/UL
ABSOLUTE MONOCYTE (OHS): 0.69 K/UL (ref 0.3–1)
ABSOLUTE NEUTROPHIL COUNT (OHS): 4.76 K/UL (ref 1.8–7.7)
ALBUMIN SERPL BCP-MCNC: 4.3 G/DL (ref 3.5–5.2)
ALP SERPL-CCNC: 69 UNIT/L (ref 40–150)
ALT SERPL W/O P-5'-P-CCNC: 35 UNIT/L (ref 10–44)
ANION GAP (OHS): 9 MMOL/L (ref 8–16)
AST SERPL-CCNC: 28 UNIT/L (ref 11–45)
BASOPHILS # BLD AUTO: 0.03 K/UL
BASOPHILS NFR BLD AUTO: 0.4 %
BILIRUB SERPL-MCNC: 1.1 MG/DL (ref 0.1–1)
BUN SERPL-MCNC: 37 MG/DL (ref 6–20)
CALCIUM SERPL-MCNC: 9.6 MG/DL (ref 8.7–10.5)
CHLORIDE SERPL-SCNC: 111 MMOL/L (ref 95–110)
CO2 SERPL-SCNC: 18 MMOL/L (ref 23–29)
CREAT SERPL-MCNC: 2.5 MG/DL (ref 0.5–1.4)
ERYTHROCYTE [DISTWIDTH] IN BLOOD BY AUTOMATED COUNT: 14.3 % (ref 11.5–14.5)
GFR SERPLBLD CREATININE-BSD FMLA CKD-EPI: 29 ML/MIN/1.73/M2
GLUCOSE SERPL-MCNC: 95 MG/DL (ref 70–110)
HCT VFR BLD AUTO: 37.8 % (ref 40–54)
HGB BLD-MCNC: 12 GM/DL (ref 14–18)
IMM GRANULOCYTES # BLD AUTO: 0.03 K/UL (ref 0–0.04)
IMM GRANULOCYTES NFR BLD AUTO: 0.4 % (ref 0–0.5)
LYMPHOCYTES # BLD AUTO: 2.75 K/UL (ref 1–4.8)
MCH RBC QN AUTO: 28 PG (ref 27–31)
MCHC RBC AUTO-ENTMCNC: 31.7 G/DL (ref 32–36)
MCV RBC AUTO: 88 FL (ref 82–98)
NUCLEATED RBC (/100WBC) (OHS): 0 /100 WBC
PLATELET # BLD AUTO: 192 K/UL (ref 150–450)
PMV BLD AUTO: 11.3 FL (ref 9.2–12.9)
POTASSIUM SERPL-SCNC: 4.8 MMOL/L (ref 3.5–5.1)
PROT SERPL-MCNC: 9 GM/DL (ref 6–8.4)
RBC # BLD AUTO: 4.29 M/UL (ref 4.6–6.2)
RELATIVE EOSINOPHIL (OHS): 2.1 %
RELATIVE LYMPHOCYTE (OHS): 32.6 % (ref 18–48)
RELATIVE MONOCYTE (OHS): 8.2 % (ref 4–15)
RELATIVE NEUTROPHIL (OHS): 56.3 % (ref 38–73)
SODIUM SERPL-SCNC: 138 MMOL/L (ref 136–145)
WBC # BLD AUTO: 8.44 K/UL (ref 3.9–12.7)

## 2025-07-18 PROCEDURE — 1160F RVW MEDS BY RX/DR IN RCRD: CPT | Mod: CPTII,S$GLB,, | Performed by: FAMILY MEDICINE

## 2025-07-18 PROCEDURE — 3074F SYST BP LT 130 MM HG: CPT | Mod: CPTII,S$GLB,, | Performed by: FAMILY MEDICINE

## 2025-07-18 PROCEDURE — 3079F DIAST BP 80-89 MM HG: CPT | Mod: CPTII,S$GLB,, | Performed by: FAMILY MEDICINE

## 2025-07-18 PROCEDURE — 3066F NEPHROPATHY DOC TX: CPT | Mod: CPTII,S$GLB,, | Performed by: FAMILY MEDICINE

## 2025-07-18 PROCEDURE — 3008F BODY MASS INDEX DOCD: CPT | Mod: CPTII,S$GLB,, | Performed by: FAMILY MEDICINE

## 2025-07-18 PROCEDURE — 80053 COMPREHEN METABOLIC PANEL: CPT

## 2025-07-18 PROCEDURE — 3072F LOW RISK FOR RETINOPATHY: CPT | Mod: CPTII,S$GLB,, | Performed by: FAMILY MEDICINE

## 2025-07-18 PROCEDURE — 3044F HG A1C LEVEL LT 7.0%: CPT | Mod: CPTII,S$GLB,, | Performed by: FAMILY MEDICINE

## 2025-07-18 PROCEDURE — 85025 COMPLETE CBC W/AUTO DIFF WBC: CPT

## 2025-07-18 PROCEDURE — 36415 COLL VENOUS BLD VENIPUNCTURE: CPT | Mod: PN

## 2025-07-18 PROCEDURE — 1159F MED LIST DOCD IN RCRD: CPT | Mod: CPTII,S$GLB,, | Performed by: FAMILY MEDICINE

## 2025-07-18 PROCEDURE — 99999 PR PBB SHADOW E&M-EST. PATIENT-LVL IV: CPT | Mod: PBBFAC,,, | Performed by: FAMILY MEDICINE

## 2025-07-18 PROCEDURE — 83036 HEMOGLOBIN GLYCOSYLATED A1C: CPT

## 2025-07-18 PROCEDURE — 99214 OFFICE O/P EST MOD 30 MIN: CPT | Mod: S$GLB,,, | Performed by: FAMILY MEDICINE

## 2025-07-18 PROCEDURE — 3060F POS MICROALBUMINURIA REV: CPT | Mod: CPTII,S$GLB,, | Performed by: FAMILY MEDICINE

## 2025-07-18 PROCEDURE — G2211 COMPLEX E/M VISIT ADD ON: HCPCS | Mod: S$GLB,,, | Performed by: FAMILY MEDICINE

## 2025-07-18 PROCEDURE — 4010F ACE/ARB THERAPY RXD/TAKEN: CPT | Mod: CPTII,S$GLB,, | Performed by: FAMILY MEDICINE

## 2025-07-18 NOTE — PROGRESS NOTES
Patient Name: Nicholas Nugent    : 1970  MRN: [id number]      Subjective:     Patient ID: Nicholas is a 55 y.o. male    Chief Complaint:  Follow-up    History of Present Illness    Nicholas presents today for follow up    He reports unintentional weight loss of 10 lbs over the past 2-3 weeks, partially attributed to recently started medication.    He reports significant improvement in blood pressuren. He denies any adverse effects, specifically noting absence of typical medication-induced fatigue.    He reports occasional urinary changes with mild discoloration but denies leatha hematuria.     He has an upcoming nephrology appointment scheduled for August 15. He expresses satisfaction with current medical team, particularly noting improved care from his nephrologist compared to previous providers.      ROS:  General: -fever, -chills, +fatigue, -weight gain, +weight loss  Eyes: -vision changes, -redness, -discharge  ENT: -ear pain, -nasal congestion, -sore throat  Cardiovascular: -chest pain, -palpitations, -lower extremity edema  Respiratory: -cough, -shortness of breath  Gastrointestinal: -abdominal pain, -nausea, -vomiting, -diarrhea, -constipation, -blood in stool  Genitourinary: -dysuria, -hematuria, -frequency, +abnormal urine appearance  Musculoskeletal: -joint pain, -muscle pain  Skin: -rash, -lesion  Neurological: -headache, -dizziness, -numbness, -tingling  Psychiatric: -anxiety, -depression, -sleep difficulty         Objective:   /84 (BP Location: Left arm, Patient Position: Sitting)   Pulse 77   Resp 18   Wt 85.3 kg (188 lb 0.8 oz)   SpO2 98%   BMI 29.45 kg/m²     Physical Exam  Vitals reviewed.   Constitutional:       General: He is not in acute distress.     Appearance: He is well-developed. He is obese. He is not ill-appearing or diaphoretic.   HENT:      Head: Normocephalic.      Right Ear: External ear normal.      Left Ear: External ear normal.      Nose: Nose normal.      Mouth/Throat:       Pharynx: No oropharyngeal exudate.   Eyes:      Extraocular Movements: Extraocular movements intact.      Pupils: Pupils are equal, round, and reactive to light.   Neck:      Trachea: No tracheal deviation.   Cardiovascular:      Rate and Rhythm: Normal rate and regular rhythm.      Heart sounds: Normal heart sounds.   Pulmonary:      Effort: Pulmonary effort is normal.      Breath sounds: Normal breath sounds. No wheezing or rales.   Abdominal:      General: Bowel sounds are normal.      Palpations: Abdomen is soft. Abdomen is not rigid. There is no mass.      Tenderness: There is no abdominal tenderness. There is no guarding or rebound.   Musculoskeletal:      Cervical back: Normal range of motion and neck supple.   Lymphadenopathy:      Cervical: No cervical adenopathy.   Neurological:      Mental Status: He is alert and oriented to person, place, and time.      Gait: Gait normal.   Psychiatric:         Mood and Affect: Mood normal.         Behavior: Behavior normal.              Assessment        ICD-10-CM ICD-9-CM   1. Type 2 diabetes mellitus with diabetic nephropathy, without long-term current use of insulin  E11.21 250.40     583.81   2. Benign hypertension  I10 401.1   3. Mixed dyslipidemia  E78.2 272.2   4. Chronic kidney disease, stage 3a  N18.31 585.3         Plan:   Assessment & Plan           1. Type 2 diabetes mellitus with diabetic nephropathy, without long-term current use of insulin  Overview:  Lab Results   Component Value Date    HGBA1C 6.4 (H) 02/07/2025    HGBA1C 6.3 (H) 08/09/2024    HGBA1C 6.7 (H) 07/01/2024     Lab Results   Component Value Date    LDLCALC 33.0 (L) 02/07/2025     Lab Results   Component Value Date    MICALBCREAT 194.8 (H) 02/07/2025    MICALBCREAT 131.8 (H) 08/09/2024        Orders:  -     Comprehensive Metabolic Panel; Future; Expected date: 01/18/2026  -     Hemoglobin A1C; Future; Expected date: 01/18/2026  -     Lipid Panel; Future; Expected date: 01/18/2026  Check  diabetic labs today.    Continue Ozempic    2. Benign hypertension  -     Comprehensive Metabolic Panel; Future; Expected date: 01/18/2026  -     Lipid Panel; Future; Expected date: 01/18/2026  Continue current blood pressure regimen.      3. Mixed dyslipidemia  Overview:  Lab Results   Component Value Date    CHOL 93 (L) 02/07/2025    CHOL 101 (L) 08/09/2024    CHOL 107 (L) 02/02/2024     Lab Results   Component Value Date    HDL 31 (L) 02/07/2025    HDL 26 (L) 08/09/2024    HDL 23 (L) 02/02/2024     Lab Results   Component Value Date    LDLCALC 33.0 (L) 02/07/2025    LDLCALC 21.2 (L) 08/09/2024    LDLCALC 29.8 (L) 02/02/2024     Lab Results   Component Value Date    TRIG 145 02/07/2025    TRIG 269 (H) 08/09/2024    TRIG 271 (H) 02/02/2024     Lab Results   Component Value Date    CHOLHDL 33.3 02/07/2025    CHOLHDL 25.7 08/09/2024    CHOLHDL 21.5 02/02/2024        The ASCVD Risk score (Irma DK, et al., 2019) failed to calculate for the following reasons:    The valid total cholesterol range is 130 to 320 mg/dL      Orders:  -     Comprehensive Metabolic Panel; Future; Expected date: 01/18/2026  -     Lipid Panel; Future; Expected date: 01/18/2026  Continue atorvastatin and Lovaza.    4. Chronic kidney disease, stage 3a  Overview:  Lab Results   Component Value Date    EGFRNORACEVR 41 (A) 02/07/2025    EGFRNORACEVR 45 (L) 11/07/2024    EGFRNORACEVR 41 (A) 08/09/2024      Lab Results   Component Value Date    CREATININE 1.9 (H) 02/07/2025    CREATININE 1.75 (H) 11/07/2024    CREATININE 1.9 (H) 08/09/2024   Continue management as per Nephrology.               -Gurwinder Olmedo Jr., MD, AAHIVS      This note was generated with the assistance of ambient listening technology. Verbal consent was obtained by the patient and accompanying visitor(s) for the recording of patient appointment to facilitate this note. I attest to having reviewed and edited the generated note for accuracy, though some syntax or spelling errors  may persist. Please contact the author of this note for any clarification.      There are no Patient Instructions on file for this visit.      Follow up for As scheduled in January.   Future Appointments   Date Time Provider Department Center   1/15/2026  8:40 AM [facility] LAB [facility] [place]   1/15/2026  9:00 AM [facility] MRI1 [facility] MRI [facility] [place]   1/20/2026 10:00 AM Maya Rodriguez MD [facility] URO [facility] Cli   1/20/2026 11:20 AM Gurwinder Olmedo Jr., MD [facility] -

## 2025-07-19 LAB
EAG (OHS): 128 MG/DL (ref 68–131)
HBA1C MFR BLD: 6.1 % (ref 4–5.6)

## 2025-08-12 DIAGNOSIS — E11.21 TYPE 2 DIABETES MELLITUS WITH DIABETIC NEPHROPATHY, WITHOUT LONG-TERM CURRENT USE OF INSULIN: Chronic | ICD-10-CM

## 2025-08-12 DIAGNOSIS — I10 BENIGN HYPERTENSION: Chronic | ICD-10-CM

## 2025-08-12 DIAGNOSIS — E78.2 MIXED DYSLIPIDEMIA: Chronic | ICD-10-CM

## 2025-08-12 RX ORDER — SEMAGLUTIDE 2.68 MG/ML
2 INJECTION, SOLUTION SUBCUTANEOUS
Qty: 9 EACH | Refills: 1 | Status: SHIPPED | OUTPATIENT
Start: 2025-08-12 | End: 2026-08-12

## 2025-08-12 RX ORDER — ATORVASTATIN CALCIUM 80 MG/1
80 TABLET, FILM COATED ORAL
Qty: 90 TABLET | Refills: 3 | OUTPATIENT
Start: 2025-08-12

## 2025-08-14 RX ORDER — AMLODIPINE AND VALSARTAN 10; 320 MG/1; MG/1
1 TABLET ORAL
Qty: 90 TABLET | Refills: 3 | Status: SHIPPED | OUTPATIENT
Start: 2025-08-14

## 2025-09-03 DIAGNOSIS — E21.3 HYPERPARATHYROIDISM: Chronic | ICD-10-CM

## 2025-09-03 RX ORDER — ERGOCALCIFEROL 1.25 MG/1
50000 CAPSULE ORAL
Qty: 3 CAPSULE | Refills: 1 | Status: SHIPPED | OUTPATIENT
Start: 2025-09-03

## 2025-09-05 ENCOUNTER — TELEPHONE (OUTPATIENT)
Dept: FAMILY MEDICINE | Facility: CLINIC | Age: 55
End: 2025-09-05
Payer: COMMERCIAL

## 2025-09-05 DIAGNOSIS — E11.21 TYPE 2 DIABETES MELLITUS WITH DIABETIC NEPHROPATHY, WITHOUT LONG-TERM CURRENT USE OF INSULIN: Primary | Chronic | ICD-10-CM

## 2025-09-05 DIAGNOSIS — N28.1 COMPLEX RENAL CYST: Chronic | ICD-10-CM

## 2025-09-05 DIAGNOSIS — N18.31 CHRONIC KIDNEY DISEASE, STAGE 3A: Chronic | ICD-10-CM

## (undated) DEVICE — COTTONBALL LG ST

## (undated) DEVICE — BLANKET LOWER BODY 55.9X40.2IN

## (undated) DEVICE — DRESSING TELFA N ADH 3X8

## (undated) DEVICE — SYR 10CC LUER LOCK

## (undated) DEVICE — BLADE SCALP OPHTL RND TIP

## (undated) DEVICE — Device

## (undated) DEVICE — SEE L#120831

## (undated) DEVICE — BLADE SURG CARBON STEEL SZ11

## (undated) DEVICE — SEE MEDLINE ITEM 146292

## (undated) DEVICE — SEE MEDLINE ITEM 154981

## (undated) DEVICE — GLOVE SURG BIOGEL LATEX SZ 7.5

## (undated) DEVICE — COVER OVERHEAD SURG LT BLUE

## (undated) DEVICE — DRAPE INCISE 65 X 100

## (undated) DEVICE — SEE MEDLINE ITEM 152622

## (undated) DEVICE — SUPPORT ULNA NERVE PROTECTOR

## (undated) DEVICE — SUT 5-0 CHROMIC GUT / P-3

## (undated) DEVICE — SEE MEDLINE ITEM 107746

## (undated) DEVICE — DRAPE STERI-DRAPE 1000 17X11IN

## (undated) DEVICE — BLADE SCALP BEAVER 2.5MM ANG

## (undated) DEVICE — CONTAINER SPECIMEN STRL 4OZ

## (undated) DEVICE — SEE MEDLINE ITEM 157103

## (undated) DEVICE — BLADE SCALP OPTHL NDL TIP 3MM

## (undated) DEVICE — ADHESIVE MASTISOL VIAL 48/BX

## (undated) DEVICE — ELECTRODE REM PLYHSV RETURN 9

## (undated) DEVICE — NDL 27G X 1 1/4